# Patient Record
Sex: MALE | Race: WHITE | NOT HISPANIC OR LATINO | Employment: OTHER | URBAN - METROPOLITAN AREA
[De-identification: names, ages, dates, MRNs, and addresses within clinical notes are randomized per-mention and may not be internally consistent; named-entity substitution may affect disease eponyms.]

---

## 2017-01-03 ENCOUNTER — COMMUNICATION - HEALTHEAST (OUTPATIENT)
Dept: FAMILY MEDICINE | Facility: CLINIC | Age: 67
End: 2017-01-03

## 2017-01-05 ENCOUNTER — OFFICE VISIT - HEALTHEAST (OUTPATIENT)
Dept: FAMILY MEDICINE | Facility: CLINIC | Age: 67
End: 2017-01-05

## 2017-01-05 ENCOUNTER — COMMUNICATION - HEALTHEAST (OUTPATIENT)
Dept: FAMILY MEDICINE | Facility: CLINIC | Age: 67
End: 2017-01-05

## 2017-01-05 DIAGNOSIS — H61.20 IMPACTED CERUMEN: ICD-10-CM

## 2017-01-05 DIAGNOSIS — M72.2 PLANTAR FASCIITIS: ICD-10-CM

## 2017-01-05 DIAGNOSIS — S76.311A RIGHT HAMSTRING MUSCLE STRAIN: ICD-10-CM

## 2017-01-05 DIAGNOSIS — D49.2 SKIN NEOPLASM: ICD-10-CM

## 2017-01-05 ASSESSMENT — MIFFLIN-ST. JEOR: SCORE: 1726.08

## 2017-01-25 ENCOUNTER — OFFICE VISIT - HEALTHEAST (OUTPATIENT)
Dept: OTOLARYNGOLOGY | Facility: CLINIC | Age: 67
End: 2017-01-25

## 2017-01-25 DIAGNOSIS — H61.23 BILATERAL IMPACTED CERUMEN: ICD-10-CM

## 2017-01-25 ASSESSMENT — MIFFLIN-ST. JEOR: SCORE: 1726.08

## 2017-04-24 ENCOUNTER — RECORDS - HEALTHEAST (OUTPATIENT)
Dept: ADMINISTRATIVE | Facility: OTHER | Age: 67
End: 2017-04-24

## 2018-02-23 ENCOUNTER — OFFICE VISIT - HEALTHEAST (OUTPATIENT)
Dept: FAMILY MEDICINE | Facility: CLINIC | Age: 68
End: 2018-02-23

## 2018-02-23 DIAGNOSIS — R03.0 ELEVATED BLOOD PRESSURE READING: ICD-10-CM

## 2018-02-23 DIAGNOSIS — Z23 NEED FOR VACCINATION: ICD-10-CM

## 2018-02-23 DIAGNOSIS — Z00.00 ROUTINE GENERAL MEDICAL EXAMINATION AT A HEALTH CARE FACILITY: ICD-10-CM

## 2018-02-23 LAB
ALBUMIN SERPL-MCNC: 3.8 G/DL (ref 3.5–5)
ALP SERPL-CCNC: 73 U/L (ref 45–120)
ALT SERPL W P-5'-P-CCNC: 43 U/L (ref 0–45)
ANION GAP SERPL CALCULATED.3IONS-SCNC: 10 MMOL/L (ref 5–18)
AST SERPL W P-5'-P-CCNC: 22 U/L (ref 0–40)
BILIRUB SERPL-MCNC: 0.5 MG/DL (ref 0–1)
BUN SERPL-MCNC: 19 MG/DL (ref 8–22)
CALCIUM SERPL-MCNC: 8.9 MG/DL (ref 8.5–10.5)
CHLORIDE BLD-SCNC: 107 MMOL/L (ref 98–107)
CO2 SERPL-SCNC: 23 MMOL/L (ref 22–31)
CREAT SERPL-MCNC: 1.07 MG/DL (ref 0.7–1.3)
ERYTHROCYTE [DISTWIDTH] IN BLOOD BY AUTOMATED COUNT: 11.8 % (ref 11–14.5)
GFR SERPL CREATININE-BSD FRML MDRD: >60 ML/MIN/1.73M2
GLUCOSE BLD-MCNC: 84 MG/DL (ref 70–125)
HCT VFR BLD AUTO: 47.1 % (ref 40–54)
HGB BLD-MCNC: 15.8 G/DL (ref 14–18)
MCH RBC QN AUTO: 31.4 PG (ref 27–34)
MCHC RBC AUTO-ENTMCNC: 33.5 G/DL (ref 32–36)
MCV RBC AUTO: 94 FL (ref 80–100)
PLATELET # BLD AUTO: 152 THOU/UL (ref 140–440)
PMV BLD AUTO: 8.5 FL (ref 7–10)
POTASSIUM BLD-SCNC: 4.6 MMOL/L (ref 3.5–5)
PROT SERPL-MCNC: 7.1 G/DL (ref 6–8)
RBC # BLD AUTO: 5.02 MILL/UL (ref 4.4–6.2)
SODIUM SERPL-SCNC: 140 MMOL/L (ref 136–145)
WBC: 7.2 THOU/UL (ref 4–11)

## 2018-02-23 ASSESSMENT — MIFFLIN-ST. JEOR: SCORE: 1723.45

## 2018-03-26 ENCOUNTER — OFFICE VISIT - HEALTHEAST (OUTPATIENT)
Dept: FAMILY MEDICINE | Facility: CLINIC | Age: 68
End: 2018-03-26

## 2018-03-26 DIAGNOSIS — R03.0 ELEVATED BLOOD PRESSURE READING: ICD-10-CM

## 2018-03-26 ASSESSMENT — MIFFLIN-ST. JEOR: SCORE: 1721.27

## 2019-03-18 ENCOUNTER — OFFICE VISIT - HEALTHEAST (OUTPATIENT)
Dept: FAMILY MEDICINE | Facility: CLINIC | Age: 69
End: 2019-03-18

## 2019-03-18 DIAGNOSIS — Z11.3 SCREEN FOR STD (SEXUALLY TRANSMITTED DISEASE): ICD-10-CM

## 2019-03-18 DIAGNOSIS — K12.0 APHTHOUS STOMATITIS: ICD-10-CM

## 2019-03-18 ASSESSMENT — MIFFLIN-ST. JEOR: SCORE: 1707.66

## 2019-03-19 LAB — T PALLIDUM AB SER QL: NEGATIVE

## 2019-03-20 LAB
HSV1 IGG SERPL QL IA: POSITIVE
HSV2 IGG SERPL QL IA: NEGATIVE

## 2019-03-21 ENCOUNTER — COMMUNICATION - HEALTHEAST (OUTPATIENT)
Dept: FAMILY MEDICINE | Facility: CLINIC | Age: 69
End: 2019-03-21

## 2019-08-30 ENCOUNTER — OFFICE VISIT - HEALTHEAST (OUTPATIENT)
Dept: FAMILY MEDICINE | Facility: CLINIC | Age: 69
End: 2019-08-30

## 2019-08-30 DIAGNOSIS — M54.50 ACUTE RIGHT-SIDED LOW BACK PAIN WITHOUT SCIATICA: ICD-10-CM

## 2019-08-30 ASSESSMENT — MIFFLIN-ST. JEOR: SCORE: 1721.27

## 2019-09-01 ENCOUNTER — SURGERY - HEALTHEAST (OUTPATIENT)
Dept: SURGERY | Facility: HOSPITAL | Age: 69
End: 2019-09-01

## 2019-09-01 ENCOUNTER — ANESTHESIA - HEALTHEAST (OUTPATIENT)
Dept: MEDSURG UNIT | Facility: HOSPITAL | Age: 69
End: 2019-09-01

## 2019-09-01 ASSESSMENT — MIFFLIN-ST. JEOR
SCORE: 1661.21
SCORE: 1661.21

## 2019-09-02 ENCOUNTER — ANESTHESIA - HEALTHEAST (OUTPATIENT)
Dept: SURGERY | Facility: HOSPITAL | Age: 69
End: 2019-09-02

## 2019-09-02 ENCOUNTER — SURGERY - HEALTHEAST (OUTPATIENT)
Dept: SURGERY | Facility: HOSPITAL | Age: 69
End: 2019-09-02

## 2019-09-09 ENCOUNTER — RECORDS - HEALTHEAST (OUTPATIENT)
Dept: LAB | Facility: CLINIC | Age: 69
End: 2019-09-09

## 2019-09-10 ENCOUNTER — OFFICE VISIT - HEALTHEAST (OUTPATIENT)
Dept: GERIATRICS | Facility: CLINIC | Age: 69
End: 2019-09-10

## 2019-09-10 DIAGNOSIS — M46.58 SEPTIC ARTHRITIS OF SACROILIAC JOINT (H): ICD-10-CM

## 2019-09-10 DIAGNOSIS — C61 PROSTATE CANCER METASTATIC TO BONE (H): ICD-10-CM

## 2019-09-10 DIAGNOSIS — C79.51 PROSTATE CANCER METASTATIC TO BONE (H): ICD-10-CM

## 2019-09-10 DIAGNOSIS — M00.9 SEPTIC ARTHRITIS OF AC JOINT (H): ICD-10-CM

## 2019-09-10 LAB
ALBUMIN SERPL-MCNC: 2.6 G/DL (ref 3.5–5)
ALP SERPL-CCNC: 87 U/L (ref 45–120)
ALT SERPL W P-5'-P-CCNC: 28 U/L (ref 0–45)
ANION GAP SERPL CALCULATED.3IONS-SCNC: 7 MMOL/L (ref 5–18)
AST SERPL W P-5'-P-CCNC: 19 U/L (ref 0–40)
BASOPHILS # BLD AUTO: 0.1 THOU/UL (ref 0–0.2)
BASOPHILS NFR BLD AUTO: 1 % (ref 0–2)
BILIRUB SERPL-MCNC: 0.6 MG/DL (ref 0–1)
BUN SERPL-MCNC: 24 MG/DL (ref 8–22)
C REACTIVE PROTEIN LHE: 2 MG/DL (ref 0–0.8)
CALCIUM SERPL-MCNC: 8.9 MG/DL (ref 8.5–10.5)
CHLORIDE BLD-SCNC: 103 MMOL/L (ref 98–107)
CO2 SERPL-SCNC: 28 MMOL/L (ref 22–31)
CREAT SERPL-MCNC: 1.03 MG/DL (ref 0.7–1.3)
EOSINOPHIL # BLD AUTO: 0.2 THOU/UL (ref 0–0.4)
EOSINOPHIL NFR BLD AUTO: 1 % (ref 0–6)
ERYTHROCYTE [DISTWIDTH] IN BLOOD BY AUTOMATED COUNT: 12.9 % (ref 11–14.5)
GFR SERPL CREATININE-BSD FRML MDRD: >60 ML/MIN/1.73M2
GLUCOSE BLD-MCNC: 93 MG/DL (ref 70–125)
HCT VFR BLD AUTO: 40.2 % (ref 40–54)
HGB BLD-MCNC: 13.2 G/DL (ref 14–18)
LYMPHOCYTES # BLD AUTO: 1.5 THOU/UL (ref 0.8–4.4)
LYMPHOCYTES NFR BLD AUTO: 14 % (ref 20–40)
MCH RBC QN AUTO: 31.1 PG (ref 27–34)
MCHC RBC AUTO-ENTMCNC: 32.8 G/DL (ref 32–36)
MCV RBC AUTO: 95 FL (ref 80–100)
MONOCYTES # BLD AUTO: 1 THOU/UL (ref 0–0.9)
MONOCYTES NFR BLD AUTO: 9 % (ref 2–10)
NEUTROPHILS # BLD AUTO: 7.6 THOU/UL (ref 2–7.7)
NEUTROPHILS NFR BLD AUTO: 71 % (ref 50–70)
PLATELET # BLD AUTO: 218 THOU/UL (ref 140–440)
PMV BLD AUTO: 11.8 FL (ref 8.5–12.5)
POTASSIUM BLD-SCNC: 4 MMOL/L (ref 3.5–5)
PROT SERPL-MCNC: 6.5 G/DL (ref 6–8)
RBC # BLD AUTO: 4.25 MILL/UL (ref 4.4–6.2)
SODIUM SERPL-SCNC: 138 MMOL/L (ref 136–145)
WBC: 10.7 THOU/UL (ref 4–11)

## 2019-09-11 ENCOUNTER — COMMUNICATION - HEALTHEAST (OUTPATIENT)
Dept: GERIATRICS | Facility: CLINIC | Age: 69
End: 2019-09-11

## 2019-09-11 DIAGNOSIS — M00.9 SEPTIC ARTHRITIS OF AC JOINT (H): ICD-10-CM

## 2019-09-11 DIAGNOSIS — M54.9 BACK PAIN, UNSPECIFIED BACK LOCATION, UNSPECIFIED BACK PAIN LATERALITY, UNSPECIFIED CHRONICITY: ICD-10-CM

## 2019-09-12 ENCOUNTER — OFFICE VISIT - HEALTHEAST (OUTPATIENT)
Dept: GERIATRICS | Facility: CLINIC | Age: 69
End: 2019-09-12

## 2019-09-12 DIAGNOSIS — C79.51 PROSTATE CANCER METASTATIC TO BONE (H): ICD-10-CM

## 2019-09-12 DIAGNOSIS — M00.9 SEPTIC ARTHRITIS OF AC JOINT (H): ICD-10-CM

## 2019-09-12 DIAGNOSIS — C61 PROSTATE CANCER METASTATIC TO BONE (H): ICD-10-CM

## 2019-09-12 LAB
GAMMA INTERFERON BACKGROUND BLD IA-ACNC: 0.04 IU/ML
M TB IFN-G BLD-IMP: NEGATIVE
MITOGEN IGNF BCKGRD COR BLD-ACNC: 0 IU/ML
MITOGEN IGNF BCKGRD COR BLD-ACNC: 0 IU/ML
QTF INTERPRETATION: NORMAL
QTF MITOGEN - NIL: 3.89 IU/ML

## 2019-09-16 ENCOUNTER — RECORDS - HEALTHEAST (OUTPATIENT)
Dept: LAB | Facility: CLINIC | Age: 69
End: 2019-09-16

## 2019-09-16 ENCOUNTER — COMMUNICATION - HEALTHEAST (OUTPATIENT)
Dept: GERIATRICS | Facility: CLINIC | Age: 69
End: 2019-09-16

## 2019-09-17 ENCOUNTER — RECORDS - HEALTHEAST (OUTPATIENT)
Dept: ADMINISTRATIVE | Facility: OTHER | Age: 69
End: 2019-09-17

## 2019-09-17 ENCOUNTER — OFFICE VISIT - HEALTHEAST (OUTPATIENT)
Dept: GERIATRICS | Facility: CLINIC | Age: 69
End: 2019-09-17

## 2019-09-17 DIAGNOSIS — M00.9 SEPTIC ARTHRITIS OF AC JOINT (H): ICD-10-CM

## 2019-09-17 DIAGNOSIS — C79.51 PROSTATE CANCER METASTATIC TO BONE (H): ICD-10-CM

## 2019-09-17 DIAGNOSIS — C61 PROSTATE CANCER METASTATIC TO BONE (H): ICD-10-CM

## 2019-09-17 DIAGNOSIS — M46.58 SEPTIC ARTHRITIS OF SACROILIAC JOINT (H): ICD-10-CM

## 2019-09-17 LAB
ALBUMIN SERPL-MCNC: 2.6 G/DL (ref 3.5–5)
ALP SERPL-CCNC: 87 U/L (ref 45–120)
ALT SERPL W P-5'-P-CCNC: 23 U/L (ref 0–45)
ANION GAP SERPL CALCULATED.3IONS-SCNC: 7 MMOL/L (ref 5–18)
AST SERPL W P-5'-P-CCNC: 19 U/L (ref 0–40)
BASOPHILS # BLD AUTO: 0.1 THOU/UL (ref 0–0.2)
BASOPHILS NFR BLD AUTO: 1 % (ref 0–2)
BILIRUB SERPL-MCNC: 0.4 MG/DL (ref 0–1)
BUN SERPL-MCNC: 15 MG/DL (ref 8–22)
C REACTIVE PROTEIN LHE: 6.3 MG/DL (ref 0–0.8)
CALCIUM SERPL-MCNC: 9.4 MG/DL (ref 8.5–10.5)
CHLORIDE BLD-SCNC: 102 MMOL/L (ref 98–107)
CO2 SERPL-SCNC: 29 MMOL/L (ref 22–31)
CREAT SERPL-MCNC: 0.8 MG/DL (ref 0.7–1.3)
EOSINOPHIL # BLD AUTO: 0.1 THOU/UL (ref 0–0.4)
EOSINOPHIL NFR BLD AUTO: 2 % (ref 0–6)
ERYTHROCYTE [DISTWIDTH] IN BLOOD BY AUTOMATED COUNT: 12.3 % (ref 11–14.5)
GFR SERPL CREATININE-BSD FRML MDRD: >60 ML/MIN/1.73M2
GLUCOSE BLD-MCNC: 102 MG/DL (ref 70–125)
HCT VFR BLD AUTO: 36.5 % (ref 40–54)
HGB BLD-MCNC: 11.9 G/DL (ref 14–18)
LYMPHOCYTES # BLD AUTO: 0.8 THOU/UL (ref 0.8–4.4)
LYMPHOCYTES NFR BLD AUTO: 12 % (ref 20–40)
MCH RBC QN AUTO: 30.9 PG (ref 27–34)
MCHC RBC AUTO-ENTMCNC: 32.6 G/DL (ref 32–36)
MCV RBC AUTO: 95 FL (ref 80–100)
MONOCYTES # BLD AUTO: 0.8 THOU/UL (ref 0–0.9)
MONOCYTES NFR BLD AUTO: 12 % (ref 2–10)
NEUTROPHILS # BLD AUTO: 4.6 THOU/UL (ref 2–7.7)
NEUTROPHILS NFR BLD AUTO: 72 % (ref 50–70)
PLATELET # BLD AUTO: 259 THOU/UL (ref 140–440)
PMV BLD AUTO: 11.4 FL (ref 8.5–12.5)
POTASSIUM BLD-SCNC: 4.1 MMOL/L (ref 3.5–5)
PROT SERPL-MCNC: 6.9 G/DL (ref 6–8)
RBC # BLD AUTO: 3.85 MILL/UL (ref 4.4–6.2)
SODIUM SERPL-SCNC: 138 MMOL/L (ref 136–145)
WBC: 6.4 THOU/UL (ref 4–11)

## 2019-09-19 ENCOUNTER — OFFICE VISIT - HEALTHEAST (OUTPATIENT)
Dept: GERIATRICS | Facility: CLINIC | Age: 69
End: 2019-09-19

## 2019-09-19 DIAGNOSIS — C79.51 PROSTATE CANCER METASTATIC TO BONE (H): ICD-10-CM

## 2019-09-19 DIAGNOSIS — C61 PROSTATE CANCER METASTATIC TO BONE (H): ICD-10-CM

## 2019-09-19 DIAGNOSIS — M00.9 SEPTIC ARTHRITIS OF AC JOINT (H): ICD-10-CM

## 2019-09-19 DIAGNOSIS — M46.58 SEPTIC ARTHRITIS OF SACROILIAC JOINT (H): ICD-10-CM

## 2019-09-23 ENCOUNTER — RECORDS - HEALTHEAST (OUTPATIENT)
Dept: LAB | Facility: CLINIC | Age: 69
End: 2019-09-23

## 2019-09-24 ENCOUNTER — OFFICE VISIT - HEALTHEAST (OUTPATIENT)
Dept: GERIATRICS | Facility: CLINIC | Age: 69
End: 2019-09-24

## 2019-09-24 DIAGNOSIS — C79.51 PROSTATE CANCER METASTATIC TO BONE (H): ICD-10-CM

## 2019-09-24 DIAGNOSIS — C61 PROSTATE CANCER METASTATIC TO BONE (H): ICD-10-CM

## 2019-09-24 DIAGNOSIS — M00.9 SEPTIC ARTHRITIS OF AC JOINT (H): ICD-10-CM

## 2019-09-24 DIAGNOSIS — M46.58 SEPTIC ARTHRITIS OF SACROILIAC JOINT (H): ICD-10-CM

## 2019-09-24 LAB
ALBUMIN SERPL-MCNC: 3.1 G/DL (ref 3.5–5)
ALP SERPL-CCNC: 101 U/L (ref 45–120)
ALT SERPL W P-5'-P-CCNC: 19 U/L (ref 0–45)
ANION GAP SERPL CALCULATED.3IONS-SCNC: 8 MMOL/L (ref 5–18)
AST SERPL W P-5'-P-CCNC: 24 U/L (ref 0–40)
BASOPHILS # BLD AUTO: 0 THOU/UL (ref 0–0.2)
BASOPHILS NFR BLD AUTO: 1 % (ref 0–2)
BILIRUB SERPL-MCNC: 0.4 MG/DL (ref 0–1)
BUN SERPL-MCNC: 18 MG/DL (ref 8–22)
C REACTIVE PROTEIN LHE: 2.3 MG/DL (ref 0–0.8)
CALCIUM SERPL-MCNC: 10.1 MG/DL (ref 8.5–10.5)
CHLORIDE BLD-SCNC: 102 MMOL/L (ref 98–107)
CO2 SERPL-SCNC: 29 MMOL/L (ref 22–31)
CREAT SERPL-MCNC: 1.05 MG/DL (ref 0.7–1.3)
EOSINOPHIL # BLD AUTO: 0.2 THOU/UL (ref 0–0.4)
EOSINOPHIL NFR BLD AUTO: 3 % (ref 0–6)
ERYTHROCYTE [DISTWIDTH] IN BLOOD BY AUTOMATED COUNT: 12.1 % (ref 11–14.5)
GFR SERPL CREATININE-BSD FRML MDRD: >60 ML/MIN/1.73M2
GLUCOSE BLD-MCNC: 89 MG/DL (ref 70–125)
HCT VFR BLD AUTO: 40.4 % (ref 40–54)
HGB BLD-MCNC: 13 G/DL (ref 14–18)
LYMPHOCYTES # BLD AUTO: 0.9 THOU/UL (ref 0.8–4.4)
LYMPHOCYTES NFR BLD AUTO: 13 % (ref 20–40)
MCH RBC QN AUTO: 30.4 PG (ref 27–34)
MCHC RBC AUTO-ENTMCNC: 32.2 G/DL (ref 32–36)
MCV RBC AUTO: 94 FL (ref 80–100)
MONOCYTES # BLD AUTO: 0.8 THOU/UL (ref 0–0.9)
MONOCYTES NFR BLD AUTO: 11 % (ref 2–10)
NEUTROPHILS # BLD AUTO: 4.7 THOU/UL (ref 2–7.7)
NEUTROPHILS NFR BLD AUTO: 72 % (ref 50–70)
PLATELET # BLD AUTO: 224 THOU/UL (ref 140–440)
PMV BLD AUTO: 11.4 FL (ref 8.5–12.5)
POTASSIUM BLD-SCNC: 4.2 MMOL/L (ref 3.5–5)
PROT SERPL-MCNC: 7.8 G/DL (ref 6–8)
RBC # BLD AUTO: 4.28 MILL/UL (ref 4.4–6.2)
SODIUM SERPL-SCNC: 139 MMOL/L (ref 136–145)
WBC: 6.6 THOU/UL (ref 4–11)

## 2019-09-25 ENCOUNTER — OFFICE VISIT - HEALTHEAST (OUTPATIENT)
Dept: INFECTIOUS DISEASES | Facility: CLINIC | Age: 69
End: 2019-09-25

## 2019-09-25 DIAGNOSIS — M00.9 SEPTIC ARTHRITIS OF AC JOINT (H): ICD-10-CM

## 2019-09-25 DIAGNOSIS — M46.58 SEPTIC ARTHRITIS OF SACROILIAC JOINT (H): ICD-10-CM

## 2019-09-26 ENCOUNTER — OFFICE VISIT - HEALTHEAST (OUTPATIENT)
Dept: GERIATRICS | Facility: CLINIC | Age: 69
End: 2019-09-26

## 2019-09-26 DIAGNOSIS — C79.51 PROSTATE CANCER METASTATIC TO BONE (H): ICD-10-CM

## 2019-09-26 DIAGNOSIS — M00.9 SEPTIC ARTHRITIS OF AC JOINT (H): ICD-10-CM

## 2019-09-26 DIAGNOSIS — M46.58 SEPTIC ARTHRITIS OF SACROILIAC JOINT (H): ICD-10-CM

## 2019-09-26 DIAGNOSIS — C61 PROSTATE CANCER METASTATIC TO BONE (H): ICD-10-CM

## 2019-09-27 ENCOUNTER — RECORDS - HEALTHEAST (OUTPATIENT)
Dept: ADMINISTRATIVE | Facility: OTHER | Age: 69
End: 2019-09-27

## 2019-09-30 ENCOUNTER — RECORDS - HEALTHEAST (OUTPATIENT)
Dept: LAB | Facility: CLINIC | Age: 69
End: 2019-09-30

## 2019-10-01 ENCOUNTER — OFFICE VISIT - HEALTHEAST (OUTPATIENT)
Dept: GERIATRICS | Facility: CLINIC | Age: 69
End: 2019-10-01

## 2019-10-01 DIAGNOSIS — M46.58 SEPTIC ARTHRITIS OF SACROILIAC JOINT (H): ICD-10-CM

## 2019-10-01 DIAGNOSIS — M00.9 SEPTIC ARTHRITIS OF AC JOINT (H): ICD-10-CM

## 2019-10-01 DIAGNOSIS — C61 PROSTATE CANCER METASTATIC TO BONE (H): ICD-10-CM

## 2019-10-01 DIAGNOSIS — C79.51 PROSTATE CANCER METASTATIC TO BONE (H): ICD-10-CM

## 2019-10-01 LAB
ALBUMIN SERPL-MCNC: 2.9 G/DL (ref 3.5–5)
ALP SERPL-CCNC: 232 U/L (ref 45–120)
ALT SERPL W P-5'-P-CCNC: 57 U/L (ref 0–45)
ANION GAP SERPL CALCULATED.3IONS-SCNC: 9 MMOL/L (ref 5–18)
AST SERPL W P-5'-P-CCNC: 39 U/L (ref 0–40)
BASOPHILS # BLD AUTO: 0 THOU/UL (ref 0–0.2)
BASOPHILS NFR BLD AUTO: 0 % (ref 0–2)
BILIRUB SERPL-MCNC: 0.4 MG/DL (ref 0–1)
BUN SERPL-MCNC: 15 MG/DL (ref 8–22)
C REACTIVE PROTEIN LHE: 5.8 MG/DL (ref 0–0.8)
CALCIUM SERPL-MCNC: 9 MG/DL (ref 8.5–10.5)
CHLORIDE BLD-SCNC: 104 MMOL/L (ref 98–107)
CO2 SERPL-SCNC: 26 MMOL/L (ref 22–31)
CREAT SERPL-MCNC: 0.81 MG/DL (ref 0.7–1.3)
EOSINOPHIL # BLD AUTO: 0.3 THOU/UL (ref 0–0.4)
EOSINOPHIL NFR BLD AUTO: 4 % (ref 0–6)
ERYTHROCYTE [DISTWIDTH] IN BLOOD BY AUTOMATED COUNT: 12.4 % (ref 11–14.5)
GFR SERPL CREATININE-BSD FRML MDRD: >60 ML/MIN/1.73M2
GLUCOSE BLD-MCNC: 96 MG/DL (ref 70–125)
HCT VFR BLD AUTO: 35.1 % (ref 40–54)
HGB BLD-MCNC: 11.7 G/DL (ref 14–18)
LYMPHOCYTES # BLD AUTO: 0.5 THOU/UL (ref 0.8–4.4)
LYMPHOCYTES NFR BLD AUTO: 7 % (ref 20–40)
MCH RBC QN AUTO: 30.5 PG (ref 27–34)
MCHC RBC AUTO-ENTMCNC: 33.3 G/DL (ref 32–36)
MCV RBC AUTO: 92 FL (ref 80–100)
MONOCYTES # BLD AUTO: 0.7 THOU/UL (ref 0–0.9)
MONOCYTES NFR BLD AUTO: 10 % (ref 2–10)
NEUTROPHILS # BLD AUTO: 5.4 THOU/UL (ref 2–7.7)
NEUTROPHILS NFR BLD AUTO: 78 % (ref 50–70)
PLATELET # BLD AUTO: 198 THOU/UL (ref 140–440)
PMV BLD AUTO: 11.4 FL (ref 8.5–12.5)
POTASSIUM BLD-SCNC: 4.2 MMOL/L (ref 3.5–5)
PROT SERPL-MCNC: 6.5 G/DL (ref 6–8)
RBC # BLD AUTO: 3.83 MILL/UL (ref 4.4–6.2)
SODIUM SERPL-SCNC: 139 MMOL/L (ref 136–145)
WBC: 6.9 THOU/UL (ref 4–11)

## 2019-10-03 ENCOUNTER — OFFICE VISIT - HEALTHEAST (OUTPATIENT)
Dept: GERIATRICS | Facility: CLINIC | Age: 69
End: 2019-10-03

## 2019-10-03 DIAGNOSIS — M00.9 SEPTIC ARTHRITIS OF AC JOINT (H): ICD-10-CM

## 2019-10-03 DIAGNOSIS — C79.51 PROSTATE CANCER METASTATIC TO BONE (H): ICD-10-CM

## 2019-10-03 DIAGNOSIS — M46.58 SEPTIC ARTHRITIS OF SACROILIAC JOINT (H): ICD-10-CM

## 2019-10-03 DIAGNOSIS — C61 PROSTATE CANCER METASTATIC TO BONE (H): ICD-10-CM

## 2019-10-07 ENCOUNTER — RECORDS - HEALTHEAST (OUTPATIENT)
Dept: LAB | Facility: CLINIC | Age: 69
End: 2019-10-07

## 2019-10-07 ENCOUNTER — HOSPITAL ENCOUNTER (OUTPATIENT)
Dept: MRI IMAGING | Facility: HOSPITAL | Age: 69
Discharge: HOME | End: 2019-10-07
Attending: INTERNAL MEDICINE

## 2019-10-07 ENCOUNTER — OFFICE VISIT - HEALTHEAST (OUTPATIENT)
Dept: GERIATRICS | Facility: CLINIC | Age: 69
End: 2019-10-07

## 2019-10-07 DIAGNOSIS — M00.9 SEPTIC ARTHRITIS OF AC JOINT (H): ICD-10-CM

## 2019-10-07 DIAGNOSIS — R29.898 RIGHT LEG WEAKNESS: ICD-10-CM

## 2019-10-07 DIAGNOSIS — C79.51 PROSTATE CANCER METASTATIC TO BONE (H): ICD-10-CM

## 2019-10-07 DIAGNOSIS — M46.38: ICD-10-CM

## 2019-10-07 DIAGNOSIS — I10 ESSENTIAL HYPERTENSION: ICD-10-CM

## 2019-10-07 DIAGNOSIS — C61 PROSTATE CANCER METASTATIC TO BONE (H): ICD-10-CM

## 2019-10-08 ENCOUNTER — RECORDS - HEALTHEAST (OUTPATIENT)
Dept: ADMINISTRATIVE | Facility: OTHER | Age: 69
End: 2019-10-08

## 2019-10-08 ENCOUNTER — OFFICE VISIT - HEALTHEAST (OUTPATIENT)
Dept: GERIATRICS | Facility: CLINIC | Age: 69
End: 2019-10-08

## 2019-10-08 DIAGNOSIS — C79.51 PROSTATE CANCER METASTATIC TO BONE (H): ICD-10-CM

## 2019-10-08 DIAGNOSIS — C61 PROSTATE CANCER METASTATIC TO BONE (H): ICD-10-CM

## 2019-10-08 DIAGNOSIS — R29.898 RIGHT LEG WEAKNESS: ICD-10-CM

## 2019-10-08 DIAGNOSIS — M00.9 SEPTIC ARTHRITIS OF AC JOINT (H): ICD-10-CM

## 2019-10-08 LAB
ALBUMIN SERPL-MCNC: 2.9 G/DL (ref 3.5–5)
ALP SERPL-CCNC: 158 U/L (ref 45–120)
ALT SERPL W P-5'-P-CCNC: 33 U/L (ref 0–45)
ANION GAP SERPL CALCULATED.3IONS-SCNC: 9 MMOL/L (ref 5–18)
AST SERPL W P-5'-P-CCNC: 23 U/L (ref 0–40)
BASOPHILS # BLD AUTO: 0 THOU/UL (ref 0–0.2)
BASOPHILS NFR BLD AUTO: 0 % (ref 0–2)
BILIRUB SERPL-MCNC: 0.3 MG/DL (ref 0–1)
BUN SERPL-MCNC: 15 MG/DL (ref 8–22)
C REACTIVE PROTEIN LHE: 2.9 MG/DL (ref 0–0.8)
CALCIUM SERPL-MCNC: 9.5 MG/DL (ref 8.5–10.5)
CHLORIDE BLD-SCNC: 104 MMOL/L (ref 98–107)
CO2 SERPL-SCNC: 27 MMOL/L (ref 22–31)
CREAT SERPL-MCNC: 0.94 MG/DL (ref 0.7–1.3)
EOSINOPHIL # BLD AUTO: 0.2 THOU/UL (ref 0–0.4)
EOSINOPHIL NFR BLD AUTO: 3 % (ref 0–6)
ERYTHROCYTE [DISTWIDTH] IN BLOOD BY AUTOMATED COUNT: 12.6 % (ref 11–14.5)
GFR SERPL CREATININE-BSD FRML MDRD: >60 ML/MIN/1.73M2
GLUCOSE BLD-MCNC: 96 MG/DL (ref 70–125)
HCT VFR BLD AUTO: 35.3 % (ref 40–54)
HGB BLD-MCNC: 11.3 G/DL (ref 14–18)
LYMPHOCYTES # BLD AUTO: 0.8 THOU/UL (ref 0.8–4.4)
LYMPHOCYTES NFR BLD AUTO: 11 % (ref 20–40)
MCH RBC QN AUTO: 29.9 PG (ref 27–34)
MCHC RBC AUTO-ENTMCNC: 32 G/DL (ref 32–36)
MCV RBC AUTO: 93 FL (ref 80–100)
MONOCYTES # BLD AUTO: 1 THOU/UL (ref 0–0.9)
MONOCYTES NFR BLD AUTO: 14 % (ref 2–10)
NEUTROPHILS # BLD AUTO: 5 THOU/UL (ref 2–7.7)
NEUTROPHILS NFR BLD AUTO: 71 % (ref 50–70)
PLATELET # BLD AUTO: 220 THOU/UL (ref 140–440)
PMV BLD AUTO: 11.2 FL (ref 8.5–12.5)
POTASSIUM BLD-SCNC: 4 MMOL/L (ref 3.5–5)
PROT SERPL-MCNC: 6.9 G/DL (ref 6–8)
RBC # BLD AUTO: 3.78 MILL/UL (ref 4.4–6.2)
SODIUM SERPL-SCNC: 140 MMOL/L (ref 136–145)
WBC: 7 THOU/UL (ref 4–11)

## 2019-10-10 ENCOUNTER — OFFICE VISIT - HEALTHEAST (OUTPATIENT)
Dept: GERIATRICS | Facility: CLINIC | Age: 69
End: 2019-10-10

## 2019-10-10 DIAGNOSIS — C79.51 PROSTATE CANCER METASTATIC TO BONE (H): ICD-10-CM

## 2019-10-10 DIAGNOSIS — R29.898 RIGHT LEG WEAKNESS: ICD-10-CM

## 2019-10-10 DIAGNOSIS — C61 PROSTATE CANCER METASTATIC TO BONE (H): ICD-10-CM

## 2019-10-10 DIAGNOSIS — M00.9 SEPTIC ARTHRITIS OF AC JOINT (H): ICD-10-CM

## 2019-10-14 ENCOUNTER — COMMUNICATION - HEALTHEAST (OUTPATIENT)
Dept: FAMILY MEDICINE | Facility: CLINIC | Age: 69
End: 2019-10-14

## 2019-10-14 ENCOUNTER — COMMUNICATION - HEALTHEAST (OUTPATIENT)
Dept: GERIATRICS | Facility: CLINIC | Age: 69
End: 2019-10-14

## 2019-10-14 ENCOUNTER — RECORDS - HEALTHEAST (OUTPATIENT)
Dept: ADMINISTRATIVE | Facility: OTHER | Age: 69
End: 2019-10-14

## 2019-10-14 ENCOUNTER — AMBULATORY - HEALTHEAST (OUTPATIENT)
Dept: GERIATRICS | Facility: CLINIC | Age: 69
End: 2019-10-14

## 2019-10-22 ENCOUNTER — RECORDS - HEALTHEAST (OUTPATIENT)
Dept: ADMINISTRATIVE | Facility: OTHER | Age: 69
End: 2019-10-22

## 2019-10-30 ENCOUNTER — OFFICE VISIT - HEALTHEAST (OUTPATIENT)
Dept: FAMILY MEDICINE | Facility: CLINIC | Age: 69
End: 2019-10-30

## 2019-10-30 DIAGNOSIS — C61 PROSTATE CANCER METASTATIC TO BONE (H): ICD-10-CM

## 2019-10-30 DIAGNOSIS — C79.51 PROSTATE CANCER METASTATIC TO BONE (H): ICD-10-CM

## 2019-10-30 DIAGNOSIS — M17.11 PRIMARY OSTEOARTHRITIS OF RIGHT KNEE: ICD-10-CM

## 2019-10-30 DIAGNOSIS — I10 ESSENTIAL HYPERTENSION: ICD-10-CM

## 2019-10-30 ASSESSMENT — MIFFLIN-ST. JEOR: SCORE: 1643.3

## 2019-12-05 ENCOUNTER — RECORDS - HEALTHEAST (OUTPATIENT)
Dept: ADMINISTRATIVE | Facility: OTHER | Age: 69
End: 2019-12-05

## 2019-12-10 ENCOUNTER — RECORDS - HEALTHEAST (OUTPATIENT)
Dept: ADMINISTRATIVE | Facility: OTHER | Age: 69
End: 2019-12-10

## 2019-12-30 ENCOUNTER — RECORDS - HEALTHEAST (OUTPATIENT)
Dept: ADMINISTRATIVE | Facility: OTHER | Age: 69
End: 2019-12-30

## 2020-01-27 ENCOUNTER — RECORDS - HEALTHEAST (OUTPATIENT)
Dept: ADMINISTRATIVE | Facility: OTHER | Age: 70
End: 2020-01-27

## 2020-02-17 ENCOUNTER — RECORDS - HEALTHEAST (OUTPATIENT)
Dept: ADMINISTRATIVE | Facility: OTHER | Age: 70
End: 2020-02-17

## 2020-02-19 ENCOUNTER — RECORDS - HEALTHEAST (OUTPATIENT)
Dept: ADMINISTRATIVE | Facility: OTHER | Age: 70
End: 2020-02-19

## 2020-03-09 ENCOUNTER — RECORDS - HEALTHEAST (OUTPATIENT)
Dept: ADMINISTRATIVE | Facility: OTHER | Age: 70
End: 2020-03-09

## 2020-03-10 ENCOUNTER — OFFICE VISIT - HEALTHEAST (OUTPATIENT)
Dept: FAMILY MEDICINE | Facility: CLINIC | Age: 70
End: 2020-03-10

## 2020-03-10 DIAGNOSIS — C61 PROSTATE CANCER METASTATIC TO BONE (H): ICD-10-CM

## 2020-03-10 DIAGNOSIS — I10 ESSENTIAL HYPERTENSION: ICD-10-CM

## 2020-03-10 DIAGNOSIS — Z23 NEED FOR VACCINATION: ICD-10-CM

## 2020-03-10 DIAGNOSIS — Z00.00 ROUTINE GENERAL MEDICAL EXAMINATION AT A HEALTH CARE FACILITY: ICD-10-CM

## 2020-03-10 DIAGNOSIS — R05.9 COUGH: ICD-10-CM

## 2020-03-10 DIAGNOSIS — C79.51 PROSTATE CANCER METASTATIC TO BONE (H): ICD-10-CM

## 2020-03-10 ASSESSMENT — ANXIETY QUESTIONNAIRES
1. FEELING NERVOUS, ANXIOUS, OR ON EDGE: NOT AT ALL
2. NOT BEING ABLE TO STOP OR CONTROL WORRYING: NOT AT ALL

## 2020-03-10 ASSESSMENT — MIFFLIN-ST. JEOR: SCORE: 1646.34

## 2020-04-20 ENCOUNTER — RECORDS - HEALTHEAST (OUTPATIENT)
Dept: ADMINISTRATIVE | Facility: OTHER | Age: 70
End: 2020-04-20

## 2020-04-28 ENCOUNTER — COMMUNICATION - HEALTHEAST (OUTPATIENT)
Dept: SCHEDULING | Facility: CLINIC | Age: 70
End: 2020-04-28

## 2020-05-04 ENCOUNTER — OFFICE VISIT - HEALTHEAST (OUTPATIENT)
Dept: FAMILY MEDICINE | Facility: CLINIC | Age: 70
End: 2020-05-04

## 2020-05-04 DIAGNOSIS — L30.9 DERMATITIS: ICD-10-CM

## 2020-06-17 ENCOUNTER — RECORDS - HEALTHEAST (OUTPATIENT)
Dept: ADMINISTRATIVE | Facility: OTHER | Age: 70
End: 2020-06-17

## 2020-07-21 ENCOUNTER — RECORDS - HEALTHEAST (OUTPATIENT)
Dept: ADMINISTRATIVE | Facility: OTHER | Age: 70
End: 2020-07-21

## 2020-11-07 ENCOUNTER — COMMUNICATION - HEALTHEAST (OUTPATIENT)
Dept: FAMILY MEDICINE | Facility: CLINIC | Age: 70
End: 2020-11-07

## 2020-11-07 DIAGNOSIS — I10 ESSENTIAL HYPERTENSION: ICD-10-CM

## 2020-12-17 ENCOUNTER — RECORDS - HEALTHEAST (OUTPATIENT)
Dept: ADMINISTRATIVE | Facility: OTHER | Age: 70
End: 2020-12-17

## 2020-12-21 ENCOUNTER — RECORDS - HEALTHEAST (OUTPATIENT)
Dept: ADMINISTRATIVE | Facility: OTHER | Age: 70
End: 2020-12-21

## 2021-01-12 ENCOUNTER — HOSPITAL ENCOUNTER (OUTPATIENT)
Dept: NUCLEAR MEDICINE | Facility: HOSPITAL | Age: 71
Discharge: HOME | End: 2021-01-12
Attending: UROLOGY

## 2021-01-12 ENCOUNTER — HOSPITAL ENCOUNTER (OUTPATIENT)
Dept: CT IMAGING | Facility: HOSPITAL | Age: 71
Discharge: HOME | End: 2021-01-12
Attending: UROLOGY

## 2021-01-12 DIAGNOSIS — C61 MALIGNANT NEOPLASM OF PROSTATE (H): ICD-10-CM

## 2021-01-12 LAB
CREAT BLD-MCNC: 1 MG/DL (ref 0.7–1.3)
GFR SERPL CREATININE-BSD FRML MDRD: >60 ML/MIN/1.73M2

## 2021-01-21 ENCOUNTER — RECORDS - HEALTHEAST (OUTPATIENT)
Dept: ADMINISTRATIVE | Facility: OTHER | Age: 71
End: 2021-01-21

## 2021-04-13 ENCOUNTER — RECORDS - HEALTHEAST (OUTPATIENT)
Dept: ADMINISTRATIVE | Facility: OTHER | Age: 71
End: 2021-04-13

## 2021-04-17 ENCOUNTER — COMMUNICATION - HEALTHEAST (OUTPATIENT)
Dept: SCHEDULING | Facility: CLINIC | Age: 71
End: 2021-04-17

## 2021-04-27 ENCOUNTER — OFFICE VISIT - HEALTHEAST (OUTPATIENT)
Dept: FAMILY MEDICINE | Facility: CLINIC | Age: 71
End: 2021-04-27

## 2021-04-27 DIAGNOSIS — D35.2 PITUITARY ADENOMA (H): ICD-10-CM

## 2021-04-27 DIAGNOSIS — G45.9 TIA (TRANSIENT ISCHEMIC ATTACK): ICD-10-CM

## 2021-04-27 DIAGNOSIS — I10 ESSENTIAL HYPERTENSION: ICD-10-CM

## 2021-04-27 DIAGNOSIS — C79.51 PROSTATE CANCER METASTATIC TO BONE (H): ICD-10-CM

## 2021-04-27 DIAGNOSIS — C61 PROSTATE CANCER METASTATIC TO BONE (H): ICD-10-CM

## 2021-04-27 ASSESSMENT — MIFFLIN-ST. JEOR: SCORE: 1682.98

## 2021-05-11 ENCOUNTER — COMMUNICATION - HEALTHEAST (OUTPATIENT)
Dept: ADMINISTRATIVE | Facility: CLINIC | Age: 71
End: 2021-05-11

## 2021-05-11 DIAGNOSIS — I10 ESSENTIAL HYPERTENSION: ICD-10-CM

## 2021-05-11 RX ORDER — AMLODIPINE BESYLATE 2.5 MG/1
2.5 TABLET ORAL DAILY
Qty: 90 TABLET | Refills: 1 | Status: SHIPPED | OUTPATIENT
Start: 2021-05-11 | End: 2022-05-30

## 2021-05-26 NOTE — PROGRESS NOTES
Assessment: /    Plan:    1. Aphthous stomatitis     2. Screen for STD (sexually transmitted disease)  Syphilis Screen, [facility]    Herpes simplex Virus (Type 1 and 2) IgG Antibody       Apply Maalox to the ulcers.  Recheck if any problem.      Subjective:    HPI:  Venkatesh Alvarado is a 68-year-old male presenting with sores on the tongue.  This has been occurring for years.  It was worse than usual 3 days ago.  He also has 1 inside the left lower lip.  He is concerned that this could be due to syphilis.      He uses mouthwash, which stings the sores.  He takes Aleve for pain.    Social Hx: He has not been sexually active for 2 years.  He believes that his partner had other partners.    Review of Systems: No fever or cough.  No dysuria.  No urethral discharge.  No rash.      Current Outpatient Medications   Medication Sig Dispense Refill     naproxen sodium (ALEVE) 220 MG tablet Take 220-440 mg by mouth every 12 (twelve) hours as needed for pain.       No current facility-administered medications for this visit.          Objective:    Vitals:    03/18/19 1250   BP: 126/80   Pulse: 66   Resp: 19   Temp: 98.4  F (36.9  C)   SpO2: 96%       Gen:  NAD, VSS  Mouth with 2 small aphthous ulcers on the tongue and one inside the left lower lip.  Neck supple without adenopathy  Lungs:  normal  Heart:  normal          ADDITIONAL HISTORY SUMMARIZED (2): None.  DECISION TO OBTAIN EXTRA INFORMATION (1): None.   RADIOLOGY TESTS (1): None.  LABS (1): Ordered.  MEDICINE TESTS (1): None.  INDEPENDENT REVIEW (2 each): None.     Total Data Points: 1

## 2021-05-30 VITALS — HEIGHT: 69 IN | BODY MASS INDEX: 31.7 KG/M2 | WEIGHT: 214 LBS

## 2021-05-30 VITALS — WEIGHT: 214 LBS | HEIGHT: 69 IN | BODY MASS INDEX: 31.7 KG/M2

## 2021-05-31 NOTE — ANESTHESIA CARE TRANSFER NOTE
Last vitals:   Vitals:    09/02/19 2020   BP:    Pulse:    Resp:    Temp: 37  C (98.6  F)   SpO2:      Patient's level of consciousness is drowsy  Spontaneous respirations: yes  Maintains airway independently: yes  Dentition unchanged: yes  Oropharynx: oropharynx clear of all foreign objects    QCDR Measures:  ASA# 20 - Surgical Safety Checklist: WHO surgical safety checklist completed prior to induction    PQRS# 430 - Adult PONV Prevention: 4558F - Pt received => 2 anti-emetic agents (different classes) preop & intraop  ASA# 8 - Peds PONV Prevention: NA - Not pediatric patient, not GA or 2 or more risk factors NOT present  PQRS# 424 - Keke-op Temp Management: 4559F - At least one body temp DOCUMENTED => 35.5C or 95.9F within required timeframe  PQRS# 426 - PACU Transfer Protocol: - Transfer of care checklist used  ASA# 14 - Acute Post-op Pain: ASA14B - Patient did NOT experience pain >= 7 out of 10

## 2021-05-31 NOTE — PROGRESS NOTES
Assessment: /    Plan:    1. Acute right-sided low back pain without sciatica         I am concerned about the possibility of abdominal aortic aneurysm.  Other possibilities include cholecystitis, pancreatitis, kidney stone, other abdominal disorder.  It is possible that muscular pain is a component, however it seems to be more than just that.  Dehydration could be contributing.    Because of the potentially serious nature of his condition, I recommend that he be seen immediately in the ER.  He agrees.  He prefers to go to [facility].  I called to relay this information.      Subjective:    HPI:  Venkatesh Alvarado is a 68-year-old male presenting with right low back pain.  He woke with pain 2 days ago.  He has not had an appetite during this time, and has not eaten.  He has had small amounts of water.  He feels lightheaded.  He also has pain in the posterior right shoulder and upper back.  He feels weak.  He was unable to walk into the clinic, so his son used a clinic wheelchair.  Patient had been golfing 3 days ago, and wondered if that caused his pain.  He is normally very active.  He obtained a cane yesterday.    Social Hx:  Former smoker.  Accompanied by his son, Tim.    Review of Systems:  No fever or vomiting, no dyspnea or chest pain.      Current Outpatient Medications   Medication Sig Dispense Refill     naproxen sodium (ALEVE) 220 MG tablet Take 220-440 mg by mouth every 12 (twelve) hours as needed for pain.       No current facility-administered medications for this visit.          Objective:    Vitals:    08/30/19 1228   BP: 96/64   Pulse: 78   Resp: 22   Temp: 97.8  F (36.6  C)   SpO2: 96%       Gen:  NAD, VSS  Lungs:  normal  Heart:  normal  Abdomen:  No HSM, mass or tenderness  Back without CVA tenderness, no midline spinal tenderness.  Right lumbar paraspinal tenderness.  Straight leg raise normal bilateral.  No rib tenderness.        ADDITIONAL HISTORY SUMMARIZED (2): None.  DECISION TO OBTAIN EXTRA  INFORMATION (1): None.   RADIOLOGY TESTS (1): None.  LABS (1): None.  MEDICINE TESTS (1): None.  INDEPENDENT REVIEW (2 each): None.     Total Data Points: 0

## 2021-05-31 NOTE — ANESTHESIA POSTPROCEDURE EVALUATION
Patient: Venkatesh Alvarado  IRRIGATION AND DEBRIDEMENT, UPPER EXTREMITY, RIGHT ACROMIOCLAVICULAR JOINT, DISTAL CLAVICAL EXCISION  Anesthesia type: general    Patient location: PACU  Last vitals:   Vitals Value Taken Time   /72 9/2/2019  9:00 PM   Temp 37  C (98.6  F) 9/2/2019  8:20 PM   Pulse 70 9/2/2019  9:08 PM   Resp 14 9/2/2019  9:00 PM   SpO2 95 % 9/2/2019  9:08 PM   Vitals shown include unvalidated device data.  Post vital signs: stable  Level of consciousness: awake and responds to simple questions  Post-anesthesia pain: pain controlled  Post-anesthesia nausea and vomiting: no  Pulmonary: unassisted, return to baseline  Cardiovascular: stable and blood pressure at baseline  Hydration: adequate  Anesthetic events: no    QCDR Measures:  ASA# 11 - Keke-op Cardiac Arrest: ASA11B - Patient did NOT experience unanticipated cardiac arrest  ASA# 12 - Keke-op Mortality Rate: ASA12B - Patient did NOT die  ASA# 13 - PACU Re-Intubation Rate: ASA13B - Patient did NOT require a new airway mgmt  ASA# 10 - Composite Anes Safety: ASA10A - No serious adverse event    Additional Notes:  Severe emergence agitation on arrival in PACU, swinging arms & legs, pulling at IVs and threatening staff. 2mg versed given, code green called, 4-point soft restraints applied until agitation resolved. Pt's family aware.  4-point restraints now off, pt much more calm and cooperative.

## 2021-05-31 NOTE — ANESTHESIA PREPROCEDURE EVALUATION
Anesthesia Evaluation      Patient summary reviewed   No history of anesthetic complications     Airway   Mallampati: II   Pulmonary - negative ROS and normal exam                          Cardiovascular - negative ROS and normal exam  ECG reviewed (NSR)  Rhythm: regular  Rate: normal,      ROS comment: 2015 TTE  Summary   No reported chest discomfort   Exercise ECG is negative for ischemia   Resting LV function is normal. Resting LVEF estimated at 55-60%   Post exercise there is appropriate augmentation in systolic function     Neuro/Psych - negative ROS     Comments: Marijuana  EtOH    Endo/Other - negative ROS      Comments: osteoarthritis    GI/Hepatic/Renal - negative ROS      Other findings: Results for LOPEZ HOLGUIN (MRN [id number]) as of 9/1/2019 18:35    9/1/2019 08:13  Sodium: 134 (L)  Potassium: 3.7  Chloride: 100  CO2: 23  Anion Gap, Calculation: 11  BUN: 58 (H)  Creatinine: 1.67 (H)  GFR MDRD Af Amer: 50 (L)  GFR MDRD Non Af Amer: 41 (L)  Calcium: 9.2  Results for LOPEZ HOLGUIN (MRN [id number]) as of 9/1/2019 18:35    9/1/2019 08:13  WBC: 13.4 (H)  RBC: 4.60  Hemoglobin: 14.2  Hematocrit: 40.8  MCV: 89  MCH: 30.9  MCHC: 34.8  RDW: 12.9  Platelets: 125 (L)  Results for LOPEZ HOLGUIN (MRN [id number]) as of 9/1/2019 18:47    9/1/2019 12:39  INR: 1.42 (H)        Dental - normal exam                        Anesthesia Plan  Planned anesthetic: general endotracheal  Will forgo scalene block for POP given infection is too close to block site  GAETT  Antiemetics    ASA 2 - emergent   Induction: intravenous   Anesthetic plan and risks discussed with: patient    Post-op plan: routine recovery

## 2021-06-01 VITALS — HEIGHT: 68 IN | BODY MASS INDEX: 32.89 KG/M2 | WEIGHT: 217 LBS

## 2021-06-01 VITALS — HEIGHT: 68 IN | BODY MASS INDEX: 32.96 KG/M2 | WEIGHT: 217.5 LBS

## 2021-06-01 NOTE — PROGRESS NOTES
[facility]    Facility:   [facility] [[id number]]   Code Status: POLST AVAILABLE    Reassessment of 68-year-old male who presented to hospital with escalating low back pain and right shoulder pain, diagnosed with be group B strep bacteremia and infection right AC and SI joint, additionally noted to have metastatic lesions T 11 and left seventh rib, PSA elevated, presumed metastatic prostate cancer, preceding history of hypertension, stabilized, transferred to TCU for six weeks course of IV Rocephin and for rehabilitation. Continues oxycodone and Tylenol, recent initiation of Voltaren gel.    Review of systems: mild decrease in right SI joint pain, mild remaining right AC joint pain. Continued generalized fatigue. Weakness right leg, difficulty ambulating secondary to pain. Participating fully in physical therapy. Denies sweats or chills. No cardiac or pulmonary symptoms. Mood stable. Remainder of 12 point review of systems obtained negative.    Exam: patient is seen with son present. Alert and oriented, blood pressure 107/65, temperature 97.6, pulse 81, respiratory 17, 02 95% on room air. No facial asymmetry or pharyngeal erythema. No carotid bruits or HJR. No cervical adenopathy. Right shoulder incision site with wound edges well approximated, trace warmth, no erythema or drainage. S1 and S2 regular. Pulmonary exam without rales rhonchi or wheezes. Abdomen without masses tenderness organomegaly. Periphery without edema. -1 strength right leg. Pedal pulses minus one and symmetrical.    Impression and plan:   septic arthritis right AC and right SI joint, continues IV Rocephin, pain decreasing,afebrile, followed by infectious diseases.   Presumed metastatic prostate cancer with elevated PSA and metastatic lesions T 11 and left seventh rib, lesions asymptomatic.   Hypertension on Norvasc with low normal blood pressure recordings.   Right leg weakness without change, nothing to  suggest cord involvement.   Concerns of patient and son in attendance reviewed.      Electronically signed by: Carmen Hein MD

## 2021-06-01 NOTE — TELEPHONE ENCOUNTER
"[facility] Nurse Triage Telephone Note      Provider: Carmen Hein MD  Facility: [facility]    Facility Type: TCU    Caller: Marilu  Call Back Number:  [phone]    Allergies: Patient has no known allergies.    Reason for call: Pt accidentally pulled out his picc line & pharmacy IV team here inserting new one without a valve. They requesting flushes now \"SASH\" with heparin.     Verbal Order/Direction given by Provider: Ok SASH per pharmacy protocal.    Provider giving order: Carmen Hein MD    Verbal order given to: Marilu Balderas RN      "

## 2021-06-01 NOTE — PROGRESS NOTES
[facility]    Facility:   [facility] [[id number]]   Code Status: POLST AVAILABLE    Admission evaluation to TCU of 68-year-old male. History is taken from patient who gives an adequate history and from accompanying hospital notes. Long-standing excellent health, highly active,retired, history of hypertension on Norvasc, presented to hospital with complaints of escalating right shoulder and low back pain, found to have septic arthritis of SI joint and right AC joint, culture results right shoulder group B strep with bacteremia present, metastatic lesions T 11 and left seventh rib identified, PSA elevated at 53, presumed metastatic prostate cancer, on IV Rocephin with recommendation of six-week course through 10/12, oxycodone for pain management, on amlodipine for hypertensive control, stabilized in hospital and transferred to TCU for rehabilitation, pain management, management of medical problems.    Past medical history, current medical problem list, drug allergies, current medication list, social history, family history, code status reviewed in epic.    Review of systems: significant right shoulder pain, increasing right lower back discomfort with radiation into right buttock. Weakness right leg. Highly troubled with potential diagnosis of prostate cancer, concerns regarding management and potential outcome. Tolerating oxycodone. Denies confusion. No chest pain or palpitations. No new focal neurologic deficits. No active G.I. or  symptoms, constipation adequately controlled. Remainder of 12 point review of systems obtained negative.    Exam: vital signs reviewed since admission to TCU. Alert and oriented, sitting in chair, highly concerned, conversant. No pharyngeal erythema. No cervical adenopathy. Mucous membranes moist. Thyroid midline regular without nodularity or tenderness. S1 and S2 regular without murmur rub or extra sounds. Pulmonary exam without rales rhonchi or  wheezes. Abdomen without hepatosplenomegaly masses or tenderness. Periphery without edema. Modest DJD changes knees and digital joints, no acute inflammatory change. Surgical incision site right superior shoulder with wound edges well approximated, no warmth or drainage. Nonspecific tenderness right SI joint. PICC line site without inflammation. No calf tenderness or swelling.    Sodium 138, potassium 4.0, cl 103, creatinine 1.03, CRP 2.0, hemoglobin 13.2, white count 10.7, platelet 218.    Impression and plan:   septic arthritis right AC joint and right SI joint, remains afebrile, on Rocephin through 10/6, significant pain right SI joint, continue oxycodone, additionally on Medrol taper.   Extensive DJD changes LS spine, no evidence of cord compression, weakness right leg reported during hospitalization, not confirmed on today's exam.   New diagnosis of probable prostate cancer, elevated PSA, metastatic suspicion T 11 and left seventh rib, concerns regarding diagnosis reviewed in detail, potential future management and long-term prognosis discussed.   Hypertension on amlodipine with satisfactory control of blood pressure   Recent encephalopathy resolved.   Total time of admission evaluation greater than 60 minutes, greater than 50% of time spent in coordination of care and counseling, multiple concerns reviewed at length.      Electronically signed by: Carmen Hein MD

## 2021-06-01 NOTE — PROGRESS NOTES
[facility]    Facility:   [facility] [[id number]]   Code Status: POLST AVAILABLE    Reevaluation of 68-year-old male admitted to hospital with septic arthritis of right shoulder and right SI joint, had experienced progressive pain of areas for several weeks prior to presentation, additionally found to have metastatic lesions thoracic vertebral and rib, asymptomatic metastatic lesions suspected to be prostatitic with elevated PSA. Transferred to TCU for IV Rocephin administration, tapering course of steroid, for rehabilitation and pain management.    Review of systems: significant decrease in pain noted today. Taking oxycodone rarely. Alternates Voltaren gel and ice to area, requests ice pack and Voltaren gel be changed to PRN. Continued weakness right leg. Increased loose stools, currently on MiraLAX b.i.d. and Colace, requests discontinuation of MiraLAX. No abdominal pain. No blood or mucus in stool. No focal neurologic deficits of new onset. Persistent weakness right lower extremity. Ability to perform in physical therapy improved with recent decrease in pain. Remainder of 12 point review of systems obtained negative.    Exam: alert and oriented, sitting in chair in no apparent distress. No HJR or cervical adenopathy. S1 and S2 regular. Pulmonary exam without adventitious sounds. Incision site right shoulder with staples removed, trace warmth, no erythema or drainage. Focal tenderness right as site joint with trace warmth, no soft-tissue swelling. Abdomen without masses or tenderness. Peripheral pulsations symmetrical, no calf tenderness or swelling. Strength right lower extremity -1.    Impression and plan:   septic arthritis with group B strep positive culture on IV Rocephin four total of six weeks. Significant pain gradually decreasing, change Voltaren and ice pack to PRN, continue Tylenol, oxycodone in use PRN.   Persistent right leg weakness, continue to observe, no  progression.   Previous constipation resolved, now with loose stools, discontinue MiraLAX, continue Colace.   Hypertension on Norvasc with satisfactory control of blood pressure.   Vertebral and rib metastatic lesions, presumed to be prostatic in origin, asymptomatic, will evaluate with urology post completion of IV Rocephin for septic arthritis.      Electronically signed by: Carmen Hein MD

## 2021-06-01 NOTE — PROGRESS NOTES
[facility]    Facility:   [facility] [[id number]]   Code Status: POLST AVAILABLE    68 year old male admitted to hospital with septic arthritis right SI and AC joint, metastatic lesions T 11 and left rib  presumed prostatic in origin, I&D right AC joint, transferred to TCU on IV Rocephin.    Review of systems: continued weakness right lower extremity, pain right SI joint remains highly symptomatic, mild decrease in intensity. No vertebral or rib discomfort. Modest discomfort right shoulder. No fever sweats or chills. No cardiac or pulmonary symptoms. Minimal use of oxycodone for pain. Remainder of 12 point review of systems obtained negative.    Exam: afebrile, vital signs reviewed over past four days. Pleasant and cooperative, sitting in lounger no apparent distress. Observed ambulating, drags right leg, -2 strength right lower extremity. S1 and S2 regular. Pulmonary exam without rales rhonchi or wheezes. No cervical adenopathy. Abdomen without masses tenderness organomegaly. Periphery without edema, trace venous stasis changes present pedal pulses intact. Focal tenderness right SI joint, previous incision right shoulder with wound edges well approximated, trace warmth, no drainage.    Impression and plan:   group B strep right AC and right SI joint, continued pain right SI joint, remains afebrile, continues IV Rocephin, followed by infectious diseases.   Hypertension on Norvasc with adequate control of blood pressure.   Pain control adequate with oxycodone PRN.   Metastatic lesions T 11 and left rib, asymptomatic, presumed prostatic in the etiology. No cord symptoms. Continue Rocephin and rehabilitation.      Electronically signed by: Cramen Hein MD

## 2021-06-01 NOTE — PROGRESS NOTES
[facility]    Facility:   [facility] [[id number]]   Code Status: POLST AVAILABLE     Reassessment of 68-year-old male who presented to hospital with right shoulder and low back pain, diagnosed with group B strep bacteremia, right shoulder culture positive for group B strep, on six week course of IV Rocephin. Additional diagnosis of metastatic lesions to T 11 and left rib, PSA elevated, presumed metastatic prostate cancer. Stabilized in hospital and transferred to TCU for rehabilitation and management of medical problems.    Review of systems: intractable low back pain this morning, improved at present. Aching discomfort localized to right SI joint, no radiation of pain. Right shoulder pain tolerable. Generalized fatigue. Difficulty participating in physical therapy due to degree of pain. Denies fever sweats or chills. No cardiac or pulmonary symptoms. No focal neurologic deficits. Appetite adequate. Remainder of 12 point review of systems obtained negative.    Exam: alert and oriented, conversant, sitting in chair, appears moderately uncomfortable. No HJR. S1 and S2 regular. Pulmonary exam without adventitious sounds. Abdomen without masses tenderness organomegaly. Periphery without edema. Focal tenderness right SI joint, right shoulder incision without erythema or warmth.    Impression and plan:   septic arthritis rt AC joint and right SI joint, continues Rocephin, continues afebrile, increasing pain, trial of Voltaren gel 1% 2 g TID to right buttock, further increase in oxycodone dose as necessary for pain control.   Metastatic prostate cancer, concerns regarding diagnosis reviewed, definitive treatment will be delayed until septic arthritis managed.   Hypertension on Norvasc with satisfactory control of blood pressure.   Multiple concerns are reviewed, discussion with nursing staff and patient.      Electronically signed by: Carmen Hein MD

## 2021-06-01 NOTE — PROGRESS NOTES
[facility] Infectious Disease  Followup Visit        Assessment:   Group B strep bacteremia 9/1, negative BC 9/3  CRP peak  23, declined now to 2.3 with ceftriaxone  AC joint septic arthritis (GH joint OK), aspirated 9/1, positive culture, much better with abx  Status post I&D 9/2, JUWAN Etienne  MRI possible early SIJ septic srthritis, also improved pain  BPH , elevated PSA,likely cancer, never had biopsy, urology following  no discitis on MRI  CT chest abdomen pelvis no other focus    Plan:   IV ceftriaxone x 6 weeks, ending 10/12, and DC picc then  Lets get a followup MRI pelvis  Wrote orders on the TCU form  Otherwise follow-up as needed    Homa Ocasio M.D.          Present Illness:   This is a 68 years old male presenting for hospital follow-up.  He has been on IV ceftriaxone for group B strep bacteremia complicating septic right AC joint and possible early septic arthritis SI joint (seen on MRI but not bone scan) on the right side.  He has been receiving his IV of IV antibiotic at the TCU without any complications.  No fever chills rash itching diarrhea nausea vomiting.  Pain both in the AC joint and the SI joint have improved significantly.  He gets around with a cane.  He does have follow-up with urology for suspicion of prostate cancer.    Review of Systems  Performed and all negative except as mentioned above.    Past medical, family, and social history reviewed, unchanged from before.        Physical Exam:     General Appearance:  Alert, cooperative, no distress, appears stated age    Head:  Normocephalic, without obvious abnormality, atraumatic   Neck no stiffness or adenopathy  Eyes no conjunctivitis or icterus     Cv No edema    shoulder  right shoulder incision healed.  No tenderness.  At the AC joint   hip  right side no direct tenderness       Extremities:  No synovitis    Skin:  Skin color, texture, turgor normal, no rashes or lesions    Neurologic:  Alert and oriented X 3, Moves all 4 extremities         DATA     Results for orders placed or performed in visit on 09/24/19   Comprehensive Metabolic Panel   Result Value Ref Range    Sodium 139 136 - 145 mmol/L    Potassium 4.2 3.5 - 5.0 mmol/L    Chloride 102 98 - 107 mmol/L    CO2 29 22 - 31 mmol/L    Anion Gap, Calculation 8 5 - 18 mmol/L    Glucose 89 70 - 125 mg/dL    BUN 18 8 - 22 mg/dL    Creatinine 1.05 0.70 - 1.30 mg/dL    GFR MDRD Af Amer >60 >60 mL/min/1.73m2    GFR MDRD Non Af Amer >60 >60 mL/min/1.73m2    Bilirubin, Total 0.4 0.0 - 1.0 mg/dL    Calcium 10.1 8.5 - 10.5 mg/dL    Protein, Total 7.8 6.0 - 8.0 g/dL    Albumin 3.1 (L) 3.5 - 5.0 g/dL    Alkaline Phosphatase 101 45 - 120 U/L    AST 24 0 - 40 U/L    ALT 19 0 - 45 U/L   C-Reactive Protein   Result Value Ref Range    CRP 2.3 (H) 0.0 - 0.8 mg/dL   HM1 (CBC with Diff)   Result Value Ref Range    WBC 6.6 4.0 - 11.0 thou/uL    RBC 4.28 (L) 4.40 - 6.20 mill/uL    Hemoglobin 13.0 (L) 14.0 - 18.0 g/dL    Hematocrit 40.4 40.0 - 54.0 %    MCV 94 80 - 100 fL    MCH 30.4 27.0 - 34.0 pg    MCHC 32.2 32.0 - 36.0 g/dL    RDW 12.1 11.0 - 14.5 %    Platelets 224 140 - 440 thou/uL    MPV 11.4 8.5 - 12.5 fL    Neutrophils % 72 (H) 50 - 70 %    Lymphocytes % 13 (L) 20 - 40 %    Monocytes % 11 (H) 2 - 10 %    Eosinophils % 3 0 - 6 %    Basophils % 1 0 - 2 %    Neutrophils Absolute 4.7 2.0 - 7.7 thou/uL    Lymphocytes Absolute 0.9 0.8 - 4.4 thou/uL    Monocytes Absolute 0.8 0.0 - 0.9 thou/uL    Eosinophils Absolute 0.2 0.0 - 0.4 thou/uL    Basophils Absolute 0.0 0.0 - 0.2 siennaou/uL         Homa Ocasio MD

## 2021-06-01 NOTE — PROGRESS NOTES
[facility]    Facility:   [facility] [[id number]]   Code Status: POLST AVAILABLE    68-year-old male TCU patient is seen for review. Admitted to hospital with progressive right shoulder and right lower back pain, diagnosed with group B strep bacteremia, infected right AC and SI joint, metastatic lesions identified T 11 and left seventh rib, PSA elevated at 53, metastatic lesions presumed prostatic in origin, stabilized and transferred to TCU for rehabilitation and management of medical problems which include hypertension, continues IV Rocephin.    Review of systems: evaluated by infectious diseases, will have repeat MRI of SI joint. Continued pain right SI joint, continued weakness right lower extremity. To continue IV antibiotic through 10/12. Denies thoracic or rib discomfort. No fever sweats or chills. No chest pain or palpitations. On one day awakened without pain, otherwise with persistent pain right lower back mildly decreased in intensity. Mood adequate, no cardiac or pulmonary symptoms. Remainder of 12 point review of systems obtained negative.    Exam: alert and oriented, pleasant and cooperative, sitting on bed edge in no apparent distress. Temperature 97.9, 02 97%, blood pressure 120/75, pulse 68 and regular. No facial asymmetry, no cervical adenopathy. S1 and S2 regular. Pulmonary exam without rales rhonchi or wheezes. Abdomen without masses tenderness organomegaly. Trace nonpitting edema lower extremities, trace venous stasis changes. -1 strength right lower extremity. No calf tenderness or swelling. Minimal warmth right EC joint, feeding erythema, no drainage, recent surgical incision site with wound edges well approximated. Focal tenderness right SI joint, no warmth of overlying tissue, no mass.    Impression and plan:   metastatic lesions left seventh rib and T 11, nothing to suggest cord irritation, elevated PSA, will see urology in future regarding definitive  treatment of presumed metastatic prostate cancer.   Group B strept bacteremia with septic joint right AC and right SI, continues IV Rocephin, remains afebrile, recent evaluation by infectious diseases, repeat MRI of SI joint pending.   Hypertension on Norvasc with satisfactory control of blood pressure.   Continued pain right SI joint with weakness right lower extremity, patient attempting to minimize oxycodone use.      Electronically signed by: Carmen Hein MD

## 2021-06-02 ENCOUNTER — RECORDS - HEALTHEAST (OUTPATIENT)
Dept: ADMINISTRATIVE | Facility: CLINIC | Age: 71
End: 2021-06-02

## 2021-06-02 VITALS — HEIGHT: 68 IN | WEIGHT: 214 LBS | BODY MASS INDEX: 32.43 KG/M2

## 2021-06-02 NOTE — PROGRESS NOTES
[facility]    Facility:   [facility] [[id number]]   Code Status: POLST AVAILABLE  68-year-old male is seen for follow up evaluation and discharge planning. Presented to hospital with generalized weakness, low back pain, diagnosed with septic AC and SI joint, bacteremia, will complete course of IV ceftriaxone 10/12, has continued in TCU for rehabilitation and management of hypertension. Course in TCU initially complicated with pain, oxycodone has subsequently been discontinued. Continued mild SI joint pain. Afebrile throughout course in TCU. Presumed metastatic prostate cancer diagnosed during hospitalization, elevated PSA and metastatic lesions to rib and vertebrae, no symptoms related to metastatic lesions. Evaluated by urology 24 hours ago, has MRI of pelvis scheduled,  prostate biopsy scheduled. Course in TCU otherwise uncomplicated. Anticipate discharge to home on 10/13 post completion of antibiotic.    Review of systems: modest right SI joint pain, continued weakness right lower extremity. Denies fever sweats or chills. Highly concerned regarding upcoming prostate biopsy and concerns regarding MRI of pelvis. Denies cardiac or pulmonary symptoms. Generalized fatigue remains present. No sweats or chills. Remainder of 12 point review of systems obtained negative.    Exam: afebrile, vital signs reviewed, alert and oriented, pleasant and conversant. No facial asymmetry. No cervical adenopathy. No pharyngeal erythema. S1 and S2 regular. Pulmonary exam without rales rhonchi or wheezes. Abdomen without masses tenderness organomegaly. Minimal tenderness right SI joint. Incision site right AC joint with wound edges well approximated, no warmth or erythema. Periphery without edema. -1 strength right lower extremity.    Impression and plan:   septic right AC joint and right SI joint status post four week course of IV cephalexin, afebrile, followed by infectious diseases.   Hypertension  with adequate control of blood pressure.   Metastatic prostate cancer, recent urologic evaluation, prostate biopsy and MRI of pelvis pending.   Weakness right lower extremity presumed secondary to SI joint inflammation with nerve irritation, no cord compression suspected.   PICC line to be removed 10/12, discharge to home 10/13.   Follow up will be with regular physician  infectious diseases and urology, total time of discharge evaluation greater than 30 minutes, greater than 50% of time spent in coordination of care and counseling. Discharge medications are as follows:  Current Outpatient Medications:      acetaminophen (TYLENOL) 500 MG tablet, Take 2 tablets (1,000 mg total) by mouth 3 (three) times a day., Disp: , Rfl: 0     amLODIPine (NORVASC) 5 MG tablet, Take 1 tablet (5 mg total) by mouth daily., Disp: , Rfl: 0     docusate sodium (COLACE) 100 MG capsule, Take 1 capsule (100 mg total) by mouth 2 (two) times a day., Disp: , Rfl: 0     ibuprofen (ADVIL,MOTRIN) 200 MG tablet, Take 2 tablets (400 mg total) by mouth every 8 (eight) hours as needed for pain., Disp: , Rfl: 0     polyethylene glycol (MIRALAX) 17 gram packet, Take 1 packet (17 g total) by mouth daily., Disp: , Rfl: 0 Tylenol in use for pain.        Electronically signed by: Carmen Hein MD

## 2021-06-02 NOTE — PROGRESS NOTES
[facility]    Facility:   [facility] [[id number]]   Code Status: POLST AVAILABLE  Reassessment of 68-year-old male with recent hospitalization for septic right AC and SI joint, diagnosed with probable metastatic carcinoma prostate carcinoma during hospitalization, bone mets identified rib and vertebrae asymptomatic, PSA elevated, continues IV antibiotic through 10/12. Oxycodone rarely in use recently discontinued.    Review of systems: denies fever sweats or chills. Continued right leg weakness, slowly improving. Ambulates with a walker, can ambulate independently with difficulty. No urinary symptoms. Continued SI joint pain. No AC joint pain. Generalized fatigue remains present. Remainder 12 point review of systems obtained negative.    Exam: alert and oriented, in no apparent distress. Observed ambulating, weakness right leg, slow gait. PICC line without inflammation. S1 and S2 regular. No cervical adenopathy. Pulmonary exam without rales rhonchi or wheezes. Abdomen without masses or tenderness. Modest SI joint tenderness, warmth right SI joint. Periphery without edema.    Impression and plan:   AC joint and SI joint infection, status post I&D right AC joint, nearing completion of IV antibiotic, remains afebrile.   Weakness right leg, mild improvement.   Probable prostatic metastatic cancer with elevated PSA and bone mets asymptomatic.   Hypertension with adequate control of blood pressure. Upcoming urology appointment.        Electronically signed by: Carmen Hein MD

## 2021-06-02 NOTE — PROGRESS NOTES
[facility]    Facility:   [facility] [[id number]]   Code Status: POLST AVAILABLE    Reassessment of 68-year-old male who presented to hospital septic right AC and SI joint, group B strep septicemia, continues in TCU  on IV Rocephin via PICC line to be continued through 10/12. History of hypertension, elevated PSA in hospital, metastatic lesions thought to be secondary to prostate cancer which has not been evaluated by urology.    Review of systems: continued pain right SI joint, has been seen by infectious diseases with plan to continue IV antibiotic through 10/12. Denies fever sweats or chills. Persistent weakness right lower extremity. No cardiac or pulmonary symptoms. Continues to fully participate in physical therapy. No new joint symptomatology. Remainder of 12 point review of systems obtained negative.    Exam: blood pressure 109/65, temperature 98.6, pulse 77, respiratory 16, 02 96% on room air. Alert and oriented, assumes sitting from supine position with evidence of pain. No pharyngeal erythema. No carotid bruits. S1 and S2 regular. Pulmonary exam without rales rhonchi or wheezes. Abdomen without masses tenderness organomegaly. Surgical site from incision and drainage right AC joint with wound edges well approximated, trace erythema and warmth, no drainage. Trace warmth overlying right SI joint, minimal focal tenderness. Strength right lower extremity -2. Pedal pulses symmetrical. No calf tenderness or swelling.    Impression and plan:  recent group B strep septicemia and septic SI AC joints, continues afebrile, continues Rocephin through 10/12.   Persistent right lower extremity weakness, no focal neurologic deficits of new onset, continues physical therapy.   Hypertension with adequate control of blood pressure   Metastatic vertebral and rib lesions, no pain associated, elevated PSA in hospital, will require urology follow-up post completion of IV  antibiotic.      Electronically signed by: Carmen Hein MD

## 2021-06-02 NOTE — PROGRESS NOTES
[facility] Patient Outreach:     Discharge Date::  10/13/19      Reason for TCU stay (discharge diagnosis)::  Septic AC and SI joints, metastatic prostate cancer      Are you feeling better, the same or worse since your discharge?:  Patient is feeling better          As part of your discharge plan, did they discuss home care with you?: No            Did you receive any new medications, or was there a change to your medications?: No            Do you have any follow up visits scheduled with your PCP or Specialist?:  No          I'm glad to hear you're doing well and we want you to continue to do well. Your PCP would like to see you for a follow-up visit. Can we help set that up for your today?: Yes            (RN) Patient transferred to [facility]? **If immediate concers (e.g. patient is feeling worse and/or not taking new medictations), send in basket message to PCP with quick summary of concern.: Yes

## 2021-06-02 NOTE — PROGRESS NOTES
[facility]    Facility:   [facility] [[id number]]   Code Status: FULL CODE      CHIEF COMPLAINT/REASON FOR VISIT:  Chief Complaint   Patient presents with     Review Of Multiple Medical Conditions       HISTORY:      HPI: Venkatesh is a 68 y.o. male who presented to [facility] ED with right shoulder pain, low back and was found to have R AC septic arthritis.   Remains on IV rocephin until 10/12. Labs weekly per ID.     While hospitalized he was found to have PSA 53 and T11 vertebral body and posterior left second rib metastasis. -to follow up with oncology after antibiotics are complete. On medrol dosepak taper.      New diagnosis of hypertension and is on amlodipine.     Today: Has had concerns with constipation. Would like something scheduled to help keep him regular. Also would like to d/c opiates as his pain has subsided quite a bit and is tolerable. Otherwise he is pleasant, attending therapies, appetite is good. .     Past Medical History:   Diagnosis Date     Osteoarthritis              Family History   Problem Relation Age of Onset     Cancer Mother      CABG Father      Stroke Father      Social History     Socioeconomic History     Marital status: Single     Spouse name: Not on file     Number of children: Not on file     Years of education: Not on file     Highest education level: Not on file   Occupational History     Not on file   Social Needs     Financial resource strain: Not on file     Food insecurity:     Worry: Not on file     Inability: Not on file     Transportation needs:     Medical: Not on file     Non-medical: Not on file   Tobacco Use     Smoking status: Former Smoker     Smokeless tobacco: Never Used   Substance and Sexual Activity     Alcohol use: Yes     Alcohol/week: 1.0 - 3.0 standard drinks     Types: 1 - 3 Cans of beer per week     Comment: 3 times a week.     Drug use: No     Sexual activity: Not Currently   Lifestyle     Physical activity:     Days  per week: Not on file     Minutes per session: Not on file     Stress: Not on file   Relationships     Social connections:     Talks on phone: Not on file     Gets together: Not on file     Attends [employer] service: Not on file     Active member of club or organization: Not on file     Attends meetings of clubs or organizations: Not on file     Relationship status: Not on file     Intimate partner violence:     Fear of current or ex partner: Not on file     Emotionally abused: Not on file     Physically abused: Not on file     Forced sexual activity: Not on file   Other Topics Concern     Not on file   Social History Narrative     Not on file         Review of Systems   Constitutional: Negative for activity change.   HENT: Negative.    Respiratory: Negative.    Cardiovascular: Negative.    Gastrointestinal: Positive for constipation.   Genitourinary: Negative.    Musculoskeletal: Positive for arthralgias and back pain.   Neurological: Negative.        Vitals:    10/09/19 2211   BP: 122/77   Pulse: 86   Temp: 97  F (36.1  C)   SpO2: 96%   Weight: 188 lb (85.3 kg)       Physical Exam  Constitutional:       Appearance: Normal appearance. He is not ill-appearing.   Eyes:      Extraocular Movements: Extraocular movements intact.   Cardiovascular:      Rate and Rhythm: Normal rate and regular rhythm.      Pulses: Normal pulses.      Heart sounds: No murmur.   Pulmonary:      Effort: Pulmonary effort is normal.      Breath sounds: Normal breath sounds. No wheezing.   Abdominal:      General: Abdomen is flat.      Palpations: Abdomen is soft.   Musculoskeletal:         General: Tenderness present.   Skin:     General: Skin is warm and dry.   Neurological:      General: No focal deficit present.      Mental Status: He is alert.           LABS:   Reviewed.     ASSESSMENT:      ICD-10-CM    1. Septic arthritis of AC joint (H) M00.9    2. Right leg weakness R29.898    3. Prostate cancer metastatic to bone (H) C61     C79.51     4. Essential hypertension I10        PLAN:    -Continue amlodipine for now (did question if he needs it long term); discuss with PCP when discharged from TCU.   -D/c oxycodone.   -Start senna S 2 tabs po q day.   -Continue to drink adequate fluids, consume high fiber diet, and ambulate as much as possible to relieve constipation.       Electronically signed by: Heri Brewster CNP

## 2021-06-02 NOTE — TELEPHONE ENCOUNTER
New Appointment Needed  What is the reason for the visit:    Inpatient/ED Follow Up Appt Request  At what hospital or facility were you seen?: [facility]  What is the reason you were seen?: 9/1-9/7 Infection in right shoulder  What date were you admitted?: date: 9/7/19  What date were you discharged?: date: 9/7/19  What was the recommended timeframe for your follow up appointment?: 7-10 days per patient  Provider Preference: PCP only  How soon do you need to be seen?:  10/18 or any day after 10/22/19 - patient declined 10/22/19 appointment  Waitlist offered?: Yes  Okay to leave a detailed message:  Yes [phone]

## 2021-06-02 NOTE — PROGRESS NOTES
[facility]    Facility:   [facility] [[id number]]   Code Status: POLST AVAILABLE      62-year-old male is seen for reevaluation post hospitalization with septic right AC and SI joint, group B strep septicemia, underwent incision and drainage right AC joint, continues IV Rocephin and rehabilitation.    Review of systems: increasing pain right SI joint, continues followed by infectious diseases, continues afebrile. Believes he has increased his activity to an extent that is contributing to increased SI joint pain. No change in weakness right leg. No new pain symptomatology. Denies cardiac or pulmonary symptoms. No focal neurologic deficits of new onset. Remainder of 12 point review of systems obtained negative.    Exam: oriented times three, pleasant and cooperative, in bed in no apparent distress, pain evident by facial expression with movement from supine to sitting position. No facial asymmetry. Pharynx without erythema. No HJR or cervical adenopathy. S1 and S2 regular. Pulmonary exam without rales rhonchi or wheezes. Abdomen without masses or tenderness. Periphery without edema. Pedal pulses symmetrical. -1 strength right lower extremity. Wound edges right AC joint well approximated, trace warmth, no drainage. Trace warmth overlying right SI joint, minimal focal tenderness.    Impression and plan:   septic arthritis right AC and SI joint,  followed by infectious diseases, continues IV Rocephin, remains afebrile.   Hypertension with adequate control of blood pressure.   Suspected prostate cancer with elevated PSA and metastatic lesions identified in hospital to vertebral body and rib, no pain involving these areas.   Persistent right lower extremity weakness, no new neurologic symptoms.   Medications and issues of concern reviewed.    Electronically signed by: Carmen Hein MD

## 2021-06-03 VITALS
OXYGEN SATURATION: 96 % | BODY MASS INDEX: 27.76 KG/M2 | DIASTOLIC BLOOD PRESSURE: 77 MMHG | WEIGHT: 188 LBS | TEMPERATURE: 97 F | SYSTOLIC BLOOD PRESSURE: 122 MMHG | HEART RATE: 86 BPM

## 2021-06-03 VITALS
DIASTOLIC BLOOD PRESSURE: 70 MMHG | WEIGHT: 195.75 LBS | SYSTOLIC BLOOD PRESSURE: 102 MMHG | HEIGHT: 69 IN | HEART RATE: 71 BPM | TEMPERATURE: 98.2 F | OXYGEN SATURATION: 96 % | RESPIRATION RATE: 20 BRPM | BODY MASS INDEX: 28.99 KG/M2

## 2021-06-03 VITALS — HEIGHT: 68 IN | WEIGHT: 217 LBS | BODY MASS INDEX: 32.89 KG/M2

## 2021-06-03 VITALS
BODY MASS INDEX: 29.58 KG/M2 | WEIGHT: 199.7 LBS | WEIGHT: 199.7 LBS | HEIGHT: 69 IN | BODY MASS INDEX: 29.58 KG/M2 | HEIGHT: 69 IN

## 2021-06-03 VITALS — HEART RATE: 72 BPM | SYSTOLIC BLOOD PRESSURE: 106 MMHG | TEMPERATURE: 97.8 F | DIASTOLIC BLOOD PRESSURE: 64 MMHG

## 2021-06-03 NOTE — PROGRESS NOTES
Assessment: /    Plan:    1. Essential hypertension  amLODIPine (NORVASC) 2.5 MG tablet   2. Prostate cancer metastatic to bone (H)  degarelix (FIRMAGON) 80 mg SolR   3. Primary osteoarthritis of right knee         Decrease amlodipine to 2.5 mg daily.    I completed disability parking certificate application due to osteoarthritis of the right knee.    He will be due for colonoscopy in April.      Subjective:    HPI:  Venkatesh Alvarado is a 69-year-old male presenting for follow-up on hypertension.  He was hospitalized September 1 to September 7 due to bacteremia, septic right AC joint and septic right SI joint.  He was found to have bony lesions consistent with metastases from prostate cancer.  PSA was 51.  He was treated with IV antibiotics.  He went to a TCU with a PICC line, and was discharged on October 10.  Amlodipine 5 mg was started during hospitalization.    He underwent prostate biopsy, which demonstrated cancer.  He has been started on antiandrogen therapy, degarelix, receiving 240 mg for the first dose, and now 80 mg every 28 days.    He uses a cane on the steps.      Review of Systems: No fever or cough.      Current Outpatient Medications   Medication Sig Dispense Refill     ibuprofen (ADVIL,MOTRIN) 200 MG tablet Take 2 tablets (400 mg total) by mouth every 8 (eight) hours as needed for pain.  0     acetaminophen (TYLENOL) 500 MG tablet Take 2 tablets (1,000 mg total) by mouth 3 (three) times a day.  0     amLODIPine (NORVASC) 2.5 MG tablet Take 1 tablet (2.5 mg total) by mouth daily. 30 tablet 11     degarelix (FIRMAGON) 80 mg SolR Inject 80 mg under the skin every 28 days.  0     docusate sodium (COLACE) 100 MG capsule Take 1 capsule (100 mg total) by mouth 2 (two) times a day.  0     polyethylene glycol (MIRALAX) 17 gram packet Take 1 packet (17 g total) by mouth daily.  0     No current facility-administered medications for this visit.          Objective:    Vitals:    10/30/19 1039   BP: 102/70    Pulse: 71   Resp: 20   Temp: 98.2  F (36.8  C)   SpO2: 96%       Gen:  NAD, VSS  Lungs:  normal  Heart:  normal          ADDITIONAL HISTORY SUMMARIZED (2): Reviewed hospital and TCU discharge summaries, urology consultation.  DECISION TO OBTAIN EXTRA INFORMATION (1): None.   RADIOLOGY TESTS (1): None.  LABS (1): None.  MEDICINE TESTS (1): None.  INDEPENDENT REVIEW (2 each): None.     Total Data Points: 2

## 2021-06-04 VITALS
RESPIRATION RATE: 22 BRPM | DIASTOLIC BLOOD PRESSURE: 70 MMHG | WEIGHT: 200.5 LBS | TEMPERATURE: 97.8 F | HEIGHT: 68 IN | HEART RATE: 69 BPM | SYSTOLIC BLOOD PRESSURE: 120 MMHG | OXYGEN SATURATION: 97 % | BODY MASS INDEX: 30.39 KG/M2

## 2021-06-05 VITALS
HEART RATE: 62 BPM | DIASTOLIC BLOOD PRESSURE: 78 MMHG | WEIGHT: 208 LBS | SYSTOLIC BLOOD PRESSURE: 136 MMHG | BODY MASS INDEX: 31.52 KG/M2 | OXYGEN SATURATION: 98 % | TEMPERATURE: 98.5 F | HEIGHT: 68 IN

## 2021-06-06 NOTE — PROGRESS NOTES
Assessment and Plan:       Venkatesh was seen today for annual wellness visit.    Routine general medical examination at a health care facility    Essential hypertension    Prostate cancer metastatic to bone (H)    Cough    Need for vaccination  -     Pneumococcal polysaccharide vaccine 23-valent 1 yo or older, subq/IM    Recheck if cough worsens or does not resolve.      The patient's current medical problems were reviewed.      The following health maintenance schedule was reviewed with the patient and provided in printed form in the after visit summary:   Health Maintenance   Topic Date Due     HEPATITIS C SCREENING  1950     ADVANCE CARE PLANNING  10/26/1969     ZOSTER VACCINES (2 of 3) 09/16/2014     MEDICARE ANNUAL WELLNESS VISIT  02/23/2019     LIPID  07/22/2019     COLORECTAL CANCER SCREENING  04/01/2020     TD 18+ HE  04/13/2020     FALL RISK ASSESSMENT  03/10/2021     PNEUMOCOCCAL IMMUNIZATION 65+ LOW/MEDIUM RISK  Completed     INFLUENZA VACCINE RULE BASED  Completed        Subjective:   Chief Complaint: Venkatesh Alvarado is an 69 y.o. male here for an Annual Wellness visit.     HPI:  Treatment for metastatic prostate cancer.  Takes dexamethasone 4 mg.  Has not needed prochlorperazine 10 mg.    Labwork done by oncologist.    Cough for 2 weeks, getting better.  No fever.  Occasional yellow sputum.    He takes vitamin D, 2 two times a day.    He has not been bowling due to right shoulder pain still.    He plans to travel to [place], [place] 3/21 to watch his son in a tournament.      Review of Systems:    Please see above.  The rest of the review of systems are negative for all systems.    Patient Care Team:  Siva Rios MD as PCP - General  Siva Rios MD as Assigned PCP     Patient Active Problem List   Diagnosis     Pituitary Neoplasm     Prostate cancer metastatic to bone (H)     Essential hypertension     Primary osteoarthritis of right knee     Past Medical History:   Diagnosis Date      Osteoarthritis       Past Surgical History:   Procedure Laterality Date     INGUINAL HERNIA REPAIR Right      PICC AND MIDLINE TEAM LINE INSERTION  9/5/2019           Family History   Problem Relation Age of Onset     Cancer Mother      CABG Father      Stroke Father       Social History     Socioeconomic History     Marital status: Single     Spouse name: Not on file     Number of children: Not on file     Years of education: Not on file     Highest education level: Not on file   Occupational History     Not on file   Social Needs     Financial resource strain: Not on file     Food insecurity     Worry: Not on file     Inability: Not on file     Transportation needs     Medical: Not on file     Non-medical: Not on file   Tobacco Use     Smoking status: Former Smoker     Packs/day: 0.00     Smokeless tobacco: Never Used     Tobacco comment: Quick 30 years ago.   Substance and Sexual Activity     Alcohol use: Yes     Alcohol/week: 1.0 - 3.0 standard drinks     Types: 1 - 3 Cans of beer per week     Comment: 3 times a week.     Drug use: No     Sexual activity: Not Currently     Partners: Female   Lifestyle     Physical activity     Days per week: Not on file     Minutes per session: Not on file     Stress: Not on file   Relationships     Social connections     Talks on phone: Not on file     Gets together: Not on file     Attends Mandaen service: Not on file     Active member of club or organization: Not on file     Attends meetings of clubs or organizations: Not on file     Relationship status: Not on file     Intimate partner violence     Fear of current or ex partner: Not on file     Emotionally abused: Not on file     Physically abused: Not on file     Forced sexual activity: Not on file   Other Topics Concern     Not on file   Social History Narrative     Not on file      Current Outpatient Medications   Medication Sig Dispense Refill     acetaminophen (TYLENOL) 500 MG tablet Take 2 tablets (1,000 mg total) by  "mouth 3 (three) times a day.  0     amLODIPine (NORVASC) 2.5 MG tablet Take 1 tablet (2.5 mg total) by mouth daily. 30 tablet 11     docusate sodium (COLACE) 100 MG capsule Take 1 capsule (100 mg total) by mouth 2 (two) times a day.  0     ibuprofen (ADVIL,MOTRIN) 200 MG tablet Take 2 tablets (400 mg total) by mouth every 8 (eight) hours as needed for pain.  0     polyethylene glycol (MIRALAX) 17 gram packet Take 1 packet (17 g total) by mouth daily.  0     degarelix (FIRMAGON) 80 mg SolR Inject 80 mg under the skin every 28 days.  0     No current facility-administered medications for this visit.       Objective:   Vital Signs:   Visit Vitals  /70   Pulse 69   Temp 97.8  F (36.6  C) (Oral)   Resp 22   Ht 5' 7.84\" (1.723 m)   Wt 200 lb 8 oz (90.9 kg)   SpO2 97%   BMI 30.63 kg/m         VisionScreening:  Normal vision    PHYSICAL EXAM  Eyes: EOM full, pupils normal, conjunctivae normal  Ears: TM's and canals normal  Oropharynx: normal  Neck: supple without adenopathy or thyromegaly  Lungs: normal  Heart: regular rhythm, normal rate, no murmur  Abdomen: no HSM, mass or tenderness  Extremities: FROM, normal strength and sensation      Assessment Results 3/10/2020   Activities of Daily Living No help needed   Instrumental Activities of Daily Living No help needed   Mini Cog Total Score 5   Some recent data might be hidden     A Mini-Cog score of 0-2 suggests the possibility of dementia, score of 3-5 suggests no dementia    Identified Health Risks:     The patient was provided with suggestions to help him develop a healthy physical lifestyle.   Patient's advanced directive was discussed and I am comfortable with the patient's wishes.        "

## 2021-06-06 NOTE — PATIENT INSTRUCTIONS - HE
Patient Education     Your Health Risk Assessment indicates you feel you are not in good physical health.    A healthy lifestyle helps keep the body fit and the mind alert. It helps protect you from disease, helps you fight disease, and helps prevent chronic disease (disease that doesn't go away) from getting worse. This is important as you get older and begin to notice twinges in muscles and joints and a decline in the strength and stamina you once took for granted. A healthy lifestyle includes good healthcare, good nutrition, weight control, recreation, and regular exercise. Avoid harmful substances and do what you can to keep safe. Another part of a healthy lifestyle is stay mentally active and socially involved.    Good healthcare     Have a wellness visit every year.     If you have new symptoms, let us know right away. Don't wait until the next checkup.     Take medicines exactly as prescribed and keep your medicines in a safe place. Tell us if your medicine causes problems.   Healthy diet and weight control     Eat 3 or 4 small, nutritious, low-fat, high-fiber meals a day. Include a variety of fruits, vegetables, and whole-grain foods.     Make sure you get enough calcium in your diet. Calcium, vitamin D, and exercise help prevent osteoporosis (bone thinning).     If you live alone, try eating with others when you can. That way you get a good meal and have company while you eat it.     Try to keep a healthy weight. If you eat more calories than your body uses for energy, it will be stored as fat and you will gain weight.     Recreation   Recreation is not limited to sports and team events. It includes any activity that provides relaxation, interest, enjoyment, and exercise. Recreation provides an outlet for physical, mental, and social energy. It can give a sense of worth and achievement. It can help you stay healthy.         Advance Directive  Patient s advance directive was discussed and I am comfortable  with the patient s wishes.  Patient Education   Personalized Prevention Plan  You are due for the preventive services outlined below.  Your care team is available to assist you in scheduling these services.  If you have already completed any of these items, please share that information with your care team to update in your medical record.  Health Maintenance   Topic Date Due     HEPATITIS C SCREENING  1950     ADVANCE CARE PLANNING  10/26/1969     ZOSTER VACCINES (2 of 3) 09/16/2014     MEDICARE ANNUAL WELLNESS VISIT  02/23/2019     LIPID  07/22/2019     COLORECTAL CANCER SCREENING  04/01/2020     TD 18+ HE  04/13/2020     FALL RISK ASSESSMENT  03/10/2021     PNEUMOCOCCAL IMMUNIZATION 65+ LOW/MEDIUM RISK  Completed     INFLUENZA VACCINE RULE BASED  Completed

## 2021-06-07 NOTE — TELEPHONE ENCOUNTER
"RN Triage:    Finished 6 rounds of chemo on 4/20/20.    Areas of flat, red and purple discolored \"spots\" and \"blotches\" on both shins which extends around inside of leg to calves x 2 weeks.  No pain or swelling.  Afebrile.  No changes in external appearance of feet.  Does not bother him at all.  No history of same.  No history of blood clots.    Request:  Please call patient to set up virtual visit for 4/29/20.    Ann Anguiano, RN   [facility]          Reason for Disposition    [1] Localized purple or blood-colored spots or dots AND [2] not from injury or friction AND [3] no fever    Protocols used: RASH OR REDNESS - [id number]      "

## 2021-06-07 NOTE — PROGRESS NOTES
"Venkatesh Alvarado is a 69 y.o. male who is being evaluated via a billable video visit.      The patient has been notified of following:     \"This video visit will be conducted via a call between you and your physician/provider. We have found that certain health care needs can be provided without the need for an in-person physical exam.  This service lets us provide the care you need with a video conversation.  If a prescription is necessary we can send it directly to your pharmacy.  If lab work is needed we can place an order for that and you can then stop by our lab to have the test done at a later time.    Video visits are billed at different rates depending on your insurance coverage. Please reach out to your insurance provider with any questions.    If during the course of the call the physician/provider feels a video visit is not appropriate, you will not be charged for this service.\"    Patient has given verbal consent to a Video visit? Yes    Patient would like to receive their AVS by AVS Preference: Carlos.    Patient would like the video invitation sent by: Text to cell phone: [phone]    Will anyone else be joining your video visit? No        Video Start Time: 2:53 pm    Additional provider notes:     Red and purple spots on lower legs for 2 weeks.  No trauma.  Not itchy.  Not increasing in size.  No calf pain or swelling.  Has been having chemotherapy.  No epistaxis or other signs of bleeding.  No cough or dyspnea.    General: no acute distress.  Speech: normal rate and content, speaks in full sentences without dyspnea.  Eyes appear normal  Legs: no edema.  Scattered red patches on lower legs, and a few purple patches.  No scaling, no central clearing.  No pettecchiae.  No pain with flexion of ankles.    Assessment:    1. Dermatitis       Plan:  Observe.  Does not appear to be purpura, or infectious.  No sign of DVT.    Call if any problem.      Video-Visit Details    Type of service:  Video " Visit    Video End Time (time video stopped): 3:08 pm  Originating Location (pt. Location): Home    Distant Location (provider location):  [facility] MEDICINE/OB      Platform used for Video Visit: Rosa Rios MD

## 2021-06-07 NOTE — TELEPHONE ENCOUNTER
CMT called patient and helped the patient download for [facility]. Patient and CMT need to know how soon the visits needs to be, if need virtual visit, would doctor recommend time.

## 2021-06-08 NOTE — PROGRESS NOTES
HISTORY OF PRESENT ILLNESS  Patient reports that he is here for removal of wax from the ear.  No change in his baseline hearing.  No vertigo.  No drainage.      REVIEW OF SYSTEMS  Review of Systems: a 10-system review was performed. Pertinent positives are noted in the HPI and on a separate scanned document in the chart.    PMH, PSH, FH and SH has documented in the EHR.      EXAM    CONSTITUTIONAL  General Appearance:  Normal, well developed, well nourished, no obvious distress  Ability to Communicate:  communicates appropriately.    HEAD AND FACE  Appearance and Symmetry:  Normal, no scalp or facial scarring or suspicious lesions.    EARS  Clinical speech reception threshold:  Normal, able to hear normal speech.  Auricle:  Normal, Auricles without scars, lesions, masses.  External auditory canal:  Cerumen impaction.  Both ears were debrided using a combination of curettage and suction.  Tympanic membrane:  Normal, Tympanic membranes normal without swelling or erythema.  Tympanic membrane mobility:  Normal, Normal tympanic membrane mobility.      IMPRESSION  Cerumen impaction    RECOMMENDATION  Follow up as needed.    Jaime Horton MD

## 2021-06-08 NOTE — PROGRESS NOTES
ASSESMENT AND PLAN:  Diagnoses and all orders for this visit:    Plantar fasciitis  Improving with heel cups and naproxen.  He will continue with this treatment plan and we did discuss the possibility of doing a steroid injection if he has worsening symptoms.    Skin neoplasm  Reviewed options today with the patient including observation, cryotherapy, referral to dermatology.  At this point, he elects for observation.  As documented below he has a 5 mm maximal diameter lesion on the right cheek without any atypical features.  We discussed indications for follow-up.    Cerumen Impaction  Unable to alleviate it today with the use of a curette and alligator clamp.  Discussed options including the use of over-the-counter wax drops followed by irrigation here in the clinic versus referral and he would like to proceed with referral as below.  Patient will call to schedule.  -     Ambulatory referral to ENT    Right hamstring muscle strain  Stretching routine recommended and demonstrated for the patient today.  We discussed the slow natural history of improvement with this and modified activities and he will follow-up if not following expected course.        SUBJECTIVE: 66-year-old male who has had chronic problems with ear wax on both sides.  Over the last couple of months he's noticed muffled hearing on both sides along with some wax coming from his ears.  He has tried home irrigation without any success.  He is not having ear pain or fever.  Patient also reports a skin spot on his right cheek that he thinks started about 4 or 5 months ago.  He does not think it's getting bigger or changing in shape or color but had a skin cancer diagnosed in one of his friends and wants to have this looked at.  It has not been bleeding or ulcerating.  Patient is also concerned by some pain on the posterior aspect of his right thigh.  Pain has been mild to moderate in severity and has been slowly improving over this last few weeks.  He  "cannot recall any specific injury around the time that it started.  It does get worse with particular movements such as when he goes bowling.  He has also had some pain on the plantar aspect of the heel of the right foot.  He started using some heel cups and the pain on the plantar aspect has improved but seems to have migrated up to the lateral heel and ankle area.    Past Medical History   Diagnosis Date     Osteoarthritis      Patient Active Problem List   Diagnosis     Pituitary Neoplasm     Cerumen Impaction     Current Outpatient Prescriptions   Medication Sig Dispense Refill     naproxen sodium (ALEVE) 220 MG tablet Take 220-440 mg by mouth every 12 (twelve) hours as needed for pain.       No current facility-administered medications for this visit.      History   Smoking Status     Former Smoker   Smokeless Tobacco     Never Used       OBJECTICE:   Visit Vitals     /82     Pulse 78     Temp 97.6  F (36.4  C) (Oral)     Resp 24     Ht 5' 9\" (1.753 m)     Wt 214 lb (97.1 kg)     SpO2 97%     BMI 31.6 kg/m2        No results found for this or any previous visit (from the past 24 hour(s)).     GEN-alert, appropriate, in no apparent distress   HEENT-external auditory canals occluded bilaterally by wax.  Using a curette and light and alligator we were unable to get the wax out because it breaks apart easily and does not come on and one called.  Patient tolerated the attempted procedure well, there was no pain and I did not contact the eardrum.   CV-regular rate and rhythm with no murmur   RESP-lungs clear to auscultation   Musculoskeletal-good range of motion and strength on testing of his right hamstrings.  He does have some pain on palpation of the hamstring insertion area in the buttock area.  He also has some pain with palpation of the heel laterally and medially but no exquisite pain with direct palpation of the insertion of the plantar fascia.   EXTREM-warm with no edema   SKIN-5 mm slightly raised " flesh-colored skin spot on the right cheek just posterior to a larger raised skin nevus at the patient reports he's had unchanged for years.      Jose Alberto Jenkins

## 2021-06-12 NOTE — TELEPHONE ENCOUNTER
Refill Approved    Rx renewed per Medication Renewal Policy. Medication was last renewed on 10/30/19.    Elizabeth Teague, [facility] Triage/Med Refill 11/9/2020     Requested Prescriptions   Pending Prescriptions Disp Refills     amLODIPine (NORVASC) 2.5 MG tablet [Pharmacy Med Name: AMLODIPINE BESYLATE 2.5 MG TAB] 30 tablet 11     Sig: TAKE 1 TABLET BY MOUTH DAILY       Calcium-Channel Blockers Protocol Passed - 11/7/2020 12:13 AM        Passed - PCP or prescribing provider visit in past 12 months or next 3 months     Last office visit with prescriber/PCP: 10/30/2019 Siva Rios MD OR same dept: Visit date not found OR same specialty: 10/30/2019 Siva Rios MD  Last physical: 3/10/2020 Last MTM visit: Visit date not found   Next visit within 3 mo: Visit date not found  Next physical within 3 mo: Visit date not found  Prescriber OR PCP: Siva Rios MD  Last diagnosis associated with med order: 1. Essential hypertension  - amLODIPine (NORVASC) 2.5 MG tablet [Pharmacy Med Name: AMLODIPINE BESYLATE 2.5 MG TAB]; TAKE 1 TABLET BY MOUTH DAILY  Dispense: 30 tablet; Refill: 11    If protocol passes may refill for 12 months if within 3 months of last provider visit (or a total of 15 months).             Passed - Blood pressure filed in past 12 months     BP Readings from Last 1 Encounters:   03/10/20 120/70

## 2021-06-16 ENCOUNTER — AMBULATORY - HEALTHEAST (OUTPATIENT)
Dept: FAMILY MEDICINE | Facility: CLINIC | Age: 71
End: 2021-06-16

## 2021-06-16 DIAGNOSIS — I10 ESSENTIAL HYPERTENSION: ICD-10-CM

## 2021-06-16 PROBLEM — M17.11 PRIMARY OSTEOARTHRITIS OF RIGHT KNEE: Status: ACTIVE | Noted: 2019-11-08

## 2021-06-16 PROBLEM — D35.2 PITUITARY ADENOMA (H): Status: ACTIVE | Noted: 2021-05-02

## 2021-06-16 RX ORDER — AMLODIPINE BESYLATE 2.5 MG/1
5 TABLET ORAL DAILY
Qty: 90 TABLET | Refills: 1 | Status: SHIPPED
Start: 2021-06-16 | End: 2021-09-29

## 2021-06-16 NOTE — PROGRESS NOTES
Assessment:      Healthy male exam.      1. Routine general medical examination at a health care facility     2. Elevated blood pressure reading  HM2(CBC w/o Differential)    Comprehensive Metabolic Panel   3. Need for vaccination  Pneumococcal conjugate vaccine 13-valent 6wks-17yrs; >50yrs          Plan:       Recheck BP in 1 month.     Subjective:      Venkatesh Alvarado is a 67 y.o. male who presents for an annual exam. The patient reports that there is not domestic violence in his life.     Blood pressure was 160/110 at the dentist recently.    He walks 3.5 miles 3 times per week.    He bowls 3 times per week.  He lifts weights at home.    He has not taken Aleve in the past 5 days.    His left hip gets sore when bowling.    He has nocturia ×2.      Healthy Habits:   Regular Exercise: Yes  Sunscreen Use: No  Healthy Diet: Yes  Dental Visits Regularly: Yes  Seat Belt: Yes  Sexually active: No  Monthly Self Testicular Exams:  Yes  Hemoccults: No  Flex Sig: No  Colonoscopy: Yes  Lipid Profile: Yes  Glucose Screen: Yes  Prevention of Osteoporosis: No  Last Dexa: No  Guns at Home:  Yes  Guns Safety Locks:  Yes      Immunization History   Administered Date(s) Administered     Hep A, historic 04/13/2010, 10/26/2010     Influenza, inj, historic,unspecified 09/21/2011     Influenza,seasonal quad, PF 10/25/2013     Pneumo Conj 13-V (2010&after) 02/23/2018     Tdap 04/13/2010     ZOSTER, LIVE 07/22/2014     Immunization status: up to date and documented.    No exam data present     Current Outpatient Prescriptions   Medication Sig Dispense Refill     naproxen sodium (ALEVE) 220 MG tablet Take 220-440 mg by mouth every 12 (twelve) hours as needed for pain.       No current facility-administered medications for this visit.      Past Medical History:   Diagnosis Date     Osteoarthritis      Past Surgical History:   Procedure Laterality Date     INGUINAL HERNIA REPAIR Right      Review of patient's allergies indicates no known  "allergies.  Family History   Problem Relation Age of Onset     Cancer Mother      CABG Father      Stroke Father      Social History     Social History     Marital status: Single     Spouse name: N/A     Number of children: N/A     Years of education: N/A     Occupational History     Not on file.     Social History Main Topics     Smoking status: Former Smoker     Smokeless tobacco: Never Used     Alcohol use 0.6 - 1.8 oz/week     1 - 3 Cans of beer per week      Comment: 3 times a week.     Drug use: No     Sexual activity: No     Other Topics Concern     Not on file     Social History Narrative       Review of Systems  Review of Systems   Constitutional: Negative.    HENT: Negative.    Eyes: Negative.    Respiratory: Negative.    Cardiovascular: Negative.    Gastrointestinal: Negative.              Objective:     Vitals:    02/23/18 1545   BP: 136/86   Pulse: 75   Resp: 20   Temp: 98.2  F (36.8  C)   TempSrc: Oral   SpO2: 94%   Weight: 217 lb 8 oz (98.7 kg)   Height: 5' 7.84\" (1.723 m)     Body mass index is 33.23 kg/(m^2).    Physical  Physical Exam     Wt increased 3# in past year.  Eyes: EOM full, pupils normal, conjunctivae normal  Ears: 80% wax bilateral  Oropharynx: normal  Neck: supple without adenopathy or thyromegaly  Lungs: normal  Heart: regular rhythm, normal rate, no murmur  Abdomen: no HSM, mass or tenderness  : circumcised, penis and testes normal, no inguinal hernia or adenopathy  Rectal: mild BPH, no nodule  Extremities: FROM, normal strength and sensation        "

## 2021-06-17 NOTE — PROGRESS NOTES
"    Assessment & Plan     Venkatesh was seen today for hypertension.    Diagnoses and all orders for this visit:    TIA (transient ischemic attack)  -     aspirin 81 MG EC tablet; Take 1 tablet (81 mg total) by mouth daily.    Essential hypertension    Prostate cancer metastatic to bone (H)    Pituitary adenoma (H)      Begin aspirin 81 mg daily.    He will obtain BP machine.  Call if usually high.  Consider increase of amlodipine then.      37 minutes spent on the date of the encounter doing chart review, review of test results and patient visit regarding TIA, hypertension, prostate cancer, pituitary adenoma.       BMI:   Estimated body mass index is 31.63 kg/m  as calculated from the following:    Height as of this encounter: 5' 8\" (1.727 m).    Weight as of this encounter: 208 lb (94.3 kg).       Return in about 1 year (around 4/27/2022) for Annual physical.    Siva Rios MD  [facility]    Subjective   Venkatesh Alvarado is 70 y.o. and presents today for the following health issues   HPI     Had left hand tingling 4/16/21, was at ER.  CTA, MRI.  BP was 212/103.  10 mm pituitary adenoma.  Had been 9 mm in 2011.    Walks 2-3 miles 4 times per week.    Has prostate cancer with bone mets, sees oncologist.    Weight increased 8 # in 1 year.      Current Outpatient Medications   Medication Sig Dispense Refill     amLODIPine (NORVASC) 2.5 MG tablet TAKE 1 TABLET BY MOUTH DAILY 90 tablet 1     cholecalciferol, vitamin D3, 1,000 unit (25 mcg) tablet Take 2,000 Units by mouth daily.       denosumab (XGEVA) 120 mg/1.7 mL (70 mg/mL) Soln Inject 120 mg under the skin every 30 (thirty) days.        docusate sodium (COLACE) 100 MG capsule Take 100 mg by mouth 2 (two) times a day as needed for constipation.       enzalutamide (XTANDI) 40 mg cap Take 160 mg by mouth daily.       leuprolide, 6 month, (ELIGARD, 6 MONTH,) 45 mg syringe Inject 45 mg under the skin every 6 (six) months.        aspirin 81 MG " "EC tablet Take 1 tablet (81 mg total) by mouth daily.  0     No current facility-administered medications for this visit.          Review of Systems  No chest pain or dyspnea.      Objective    /78 (Patient Site: Left Arm, Patient Position: Sitting, Cuff Size: Adult Regular)   Pulse 62   Temp 98.5  F (36.9  C) (Oral)   Ht 5' 8\" (1.727 m)   Wt 208 lb (94.3 kg)   SpO2 98%   BMI 31.63 kg/m    Body mass index is 31.63 kg/m .  Physical Exam  Heart normal  Lungs normal  Tinel's and Phalen's tests negative bilateral              "

## 2021-06-17 NOTE — TELEPHONE ENCOUNTER
Pt call back to follow-up on status of this request.  He schedule a telephone visit with you for tomorrow at 1020am. He starts a new job tomorrow and would like call no later than 12pm.

## 2021-06-17 NOTE — TELEPHONE ENCOUNTER
Reason for Call:  Other FYI     Detailed comments: Caller wanted to let Siva Rios MD  that his Blood pressure readings are around the 150/90 and sometimes higher as well.     Phone Number Patient can be reached at: Home number on file [phone] (home)    Best Time: anytime    Can we leave a detailed message on this number?: Yes    Call taken on 5/11/2021 at 11:39 AM by Lis Hicks

## 2021-06-17 NOTE — TELEPHONE ENCOUNTER
Reason for Call:  Medication or medication refill:    Do you use a [facility]?  Name of the pharmacy and phone number for the current request: [facility][id number]    Name of the medication requested: Amlodipine     Other request: n.a    Can we leave a detailed message on this number? No call back needed    Phone number patient can be reached at: n/a    Best Time: n/a    Call taken on 5/11/2021 at 11:36 AM by Lis Hicks

## 2021-06-17 NOTE — TELEPHONE ENCOUNTER
RN cannot approve Refill Request    RN can NOT refill this medication med is not covered by policy/route to provider. Last office visit: 4/27/2021 Siva Rios MD Last Physical: 3/10/2020 Last MTM visit: Visit date not found Last visit same specialty: Visit date not found.  Next visit within 3 mo: Visit date not found  Next physical within 3 mo: Visit date not found      Denise King, [facility] Triage/Med Refill 5/11/2021    Requested Prescriptions   Pending Prescriptions Disp Refills     amLODIPine (NORVASC) 2.5 MG tablet 90 tablet 1     Sig: Take 1 tablet (2.5 mg total) by mouth daily.       There is no refill protocol information for this order

## 2021-06-17 NOTE — PROGRESS NOTES
Assessment: /    Plan:    1. Elevated blood pressure reading         He will return for a physical next year.      Subjective:    HPI:  Venkatesh Alvarado is a 67-year-old male presenting for follow-up on elevated blood pressure reading.  He has been feeling well.  Recent lab work was normal.        Review of Systems: No cough or chest pain.      Current Outpatient Prescriptions   Medication Sig Dispense Refill     naproxen sodium (ALEVE) 220 MG tablet Take 220-440 mg by mouth every 12 (twelve) hours as needed for pain.       No current facility-administered medications for this visit.          Objective:    Vitals:    03/26/18 0902   BP: 132/76   Pulse: 66   Resp: 19   Temp: 97.9  F (36.6  C)   SpO2: 96%       Gen:  NAD, VSS  Lungs:  normal  Heart:  normal          ADDITIONAL HISTORY SUMMARIZED (2): None.  DECISION TO OBTAIN EXTRA INFORMATION (1): None.   RADIOLOGY TESTS (1): None.  LABS (1): None.  MEDICINE TESTS (1): None.  INDEPENDENT REVIEW (2 each): None.     Total Data Points: 0

## 2021-06-19 NOTE — LETTER
Letter by Carmen Hein MD at      Author: Carmen Hein MD Service: -- Author Type: --    Filed:  Encounter Date: 10/10/2019 Status: Signed         Patient: Venkatesh Alvarado   MR Number: [id number]   YOB: 1950   Date of Visit: 10/10/2019     [facility]    Facility:   [facility] [[id number]]   Code Status: POLST AVAILABLE  68-year-old male is seen for follow up evaluation and discharge planning. Presented to hospital with generalized weakness, low back pain, diagnosed with septic AC and SI joint, bacteremia, will complete course of IV ceftriaxone 10/12, has continued in TCU for rehabilitation and management of hypertension. Course in TCU initially complicated with pain, oxycodone has subsequently been discontinued. Continued mild SI joint pain. Afebrile throughout course in TCU. Presumed metastatic prostate cancer diagnosed during hospitalization, elevated PSA and metastatic lesions to rib and vertebrae, no symptoms related to metastatic lesions. Evaluated by urology 24 hours ago, has MRI of pelvis scheduled,  prostate biopsy scheduled. Course in TCU otherwise uncomplicated. Anticipate discharge to home on 10/13 post completion of antibiotic.    Review of systems: modest right SI joint pain, continued weakness right lower extremity. Denies fever sweats or chills. Highly concerned regarding upcoming prostate biopsy and concerns regarding MRI of pelvis. Denies cardiac or pulmonary symptoms. Generalized fatigue remains present. No sweats or chills. Remainder of 12 point review of systems obtained negative.    Exam: afebrile, vital signs reviewed, alert and oriented, pleasant and conversant. No facial asymmetry. No cervical adenopathy. No pharyngeal erythema. S1 and S2 regular. Pulmonary exam without rales rhonchi or wheezes. Abdomen without masses tenderness organomegaly. Minimal tenderness right SI joint. Incision site right AC joint with wound edges well  approximated, no warmth or erythema. Periphery without edema. -1 strength right lower extremity.    Impression and plan:   septic right AC joint and right SI joint status post four week course of IV cephalexin, afebrile, followed by infectious diseases.   Hypertension with adequate control of blood pressure.   Metastatic prostate cancer, recent urologic evaluation, prostate biopsy and MRI of pelvis pending.   Weakness right lower extremity presumed secondary to SI joint inflammation with nerve irritation, no cord compression suspected.   PICC line to be removed 10/12, discharge to home 10/13.   Follow up will be with regular physician  infectious diseases and urology, total time of discharge evaluation greater than 30 minutes, greater than 50% of time spent in coordination of care and counseling. Discharge medications are as follows:  Current Outpatient Medications:   ?  acetaminophen (TYLENOL) 500 MG tablet, Take 2 tablets (1,000 mg total) by mouth 3 (three) times a day., Disp: , Rfl: 0  ?  amLODIPine (NORVASC) 5 MG tablet, Take 1 tablet (5 mg total) by mouth daily., Disp: , Rfl: 0  ?  docusate sodium (COLACE) 100 MG capsule, Take 1 capsule (100 mg total) by mouth 2 (two) times a day., Disp: , Rfl: 0  ?  ibuprofen (ADVIL,MOTRIN) 200 MG tablet, Take 2 tablets (400 mg total) by mouth every 8 (eight) hours as needed for pain., Disp: , Rfl: 0  ?  polyethylene glycol (MIRALAX) 17 gram packet, Take 1 packet (17 g total) by mouth daily., Disp: , Rfl: 0 Tylenol in use for pain.        Electronically signed by: Caremn Hein MD

## 2021-06-19 NOTE — LETTER
Letter by Carmen Hein MD at      Author: Carmen Hein MD Service: -- Author Type: --    Filed:  Encounter Date: 9/24/2019 Status: Signed         Patient: Venkatesh Alvarado   MR Number: [id number]   YOB: 1950   Date of Visit: 9/24/2019     [facility]    Facility:   [facility] [[id number]]   Code Status: POLST AVAILABLE    68 year old male admitted to hospital with septic arthritis right SI and AC joint, metastatic lesions T 11 and left rib  presumed prostatic in origin, I&D right AC joint, transferred to TCU on IV Rocephin.    Review of systems: continued weakness right lower extremity, pain right SI joint remains highly symptomatic, mild decrease in intensity. No vertebral or rib discomfort. Modest discomfort right shoulder. No fever sweats or chills. No cardiac or pulmonary symptoms. Minimal use of oxycodone for pain. Remainder of 12 point review of systems obtained negative.    Exam: afebrile, vital signs reviewed over past four days. Pleasant and cooperative, sitting in lounger no apparent distress. Observed ambulating, drags right leg, -2 strength right lower extremity. S1 and S2 regular. Pulmonary exam without rales rhonchi or wheezes. No cervical adenopathy. Abdomen without masses tenderness organomegaly. Periphery without edema, trace venous stasis changes present pedal pulses intact. Focal tenderness right SI joint, previous incision right shoulder with wound edges well approximated, trace warmth, no drainage.    Impression and plan:   group B strep right AC and right SI joint, continued pain right SI joint, remains afebrile, continues IV Rocephin, followed by infectious diseases.   Hypertension on Norvasc with adequate control of blood pressure.   Pain control adequate with oxycodone PRN.   Metastatic lesions T 11 and left rib, asymptomatic, presumed prostatic in the etiology. No cord symptoms. Continue Rocephin and  rehabilitation.      Electronically signed by: Carmen Hein MD

## 2021-06-19 NOTE — LETTER
Letter by Carmen Hein MD at      Author: Carmen Hein MD Service: -- Author Type: --    Filed:  Encounter Date: 10/3/2019 Status: Signed         Patient: Venkatesh Alvarado   MR Number: [id number]   YOB: 1950   Date of Visit: 10/3/2019     [facility]    Facility:   [facility] [[id number]]   Code Status: POLST AVAILABLE      62-year-old male is seen for reevaluation post hospitalization with septic right AC and SI joint, group B strep septicemia, underwent incision and drainage right AC joint, continues IV Rocephin and rehabilitation.    Review of systems: increasing pain right SI joint, continues followed by infectious diseases, continues afebrile. Believes he has increased his activity to an extent that is contributing to increased SI joint pain. No change in weakness right leg. No new pain symptomatology. Denies cardiac or pulmonary symptoms. No focal neurologic deficits of new onset. Remainder of 12 point review of systems obtained negative.    Exam: oriented times three, pleasant and cooperative, in bed in no apparent distress, pain evident by facial expression with movement from supine to sitting position. No facial asymmetry. Pharynx without erythema. No HJR or cervical adenopathy. S1 and S2 regular. Pulmonary exam without rales rhonchi or wheezes. Abdomen without masses or tenderness. Periphery without edema. Pedal pulses symmetrical. -1 strength right lower extremity. Wound edges right AC joint well approximated, trace warmth, no drainage. Trace warmth overlying right SI joint, minimal focal tenderness.    Impression and plan:   septic arthritis right AC and SI joint,  followed by infectious diseases, continues IV Rocephin, remains afebrile.   Hypertension with adequate control of blood pressure.   Suspected prostate cancer with elevated PSA and metastatic lesions identified in hospital to vertebral body and rib, no pain involving these areas.    Persistent right lower extremity weakness, no new neurologic symptoms.   Medications and issues of concern reviewed.    Electronically signed by: Carmen Hein MD

## 2021-06-19 NOTE — LETTER
Letter by Heri Brewster CNP at      Author: Heri Brewster CNP Service: -- Author Type: --    Filed:  Encounter Date: 10/7/2019 Status: Signed         Patient: Venkatesh Alvarado   MR Number: [id number]   YOB: 1950   Date of Visit: 10/7/2019     [facility]    Facility:   [facility] [[id number]]   Code Status: FULL CODE      CHIEF COMPLAINT/REASON FOR VISIT:  Chief Complaint   Patient presents with   ? Review Of Multiple Medical Conditions       HISTORY:      HPI: Venkatesh is a 68 y.o. male who presented to [facility] ED with right shoulder pain, low back and was found to have R AC septic arthritis.   Remains on IV rocephin until 10/12. Labs weekly per ID.     While hospitalized he was found to have PSA 53 and T11 vertebral body and posterior left second rib metastasis. -to follow up with oncology after antibiotics are complete. On medrol dosepak taper.      New diagnosis of hypertension and is on amlodipine.     Today: Has had concerns with constipation. Would like something scheduled to help keep him regular. Also would like to d/c opiates as his pain has subsided quite a bit and is tolerable. Otherwise he is pleasant, attending therapies, appetite is good. .     Past Medical History:   Diagnosis Date   ? Osteoarthritis              Family History   Problem Relation Age of Onset   ? Cancer Mother    ? CABG Father    ? Stroke Father      Social History     Socioeconomic History   ? Marital status: Single     Spouse name: Not on file   ? Number of children: Not on file   ? Years of education: Not on file   ? Highest education level: Not on file   Occupational History   ? Not on file   Social Needs   ? Financial resource strain: Not on file   ? Food insecurity:     Worry: Not on file     Inability: Not on file   ? Transportation needs:     Medical: Not on file     Non-medical: Not on file   Tobacco Use   ? Smoking status: Former Smoker   ? Smokeless tobacco:  Never Used   Substance and Sexual Activity   ? Alcohol use: Yes     Alcohol/week: 1.0 - 3.0 standard drinks     Types: 1 - 3 Cans of beer per week     Comment: 3 times a week.   ? Drug use: No   ? Sexual activity: Not Currently   Lifestyle   ? Physical activity:     Days per week: Not on file     Minutes per session: Not on file   ? Stress: Not on file   Relationships   ? Social connections:     Talks on phone: Not on file     Gets together: Not on file     Attends Hoahaoism service: Not on file     Active member of club or organization: Not on file     Attends meetings of clubs or organizations: Not on file     Relationship status: Not on file   ? Intimate partner violence:     Fear of current or ex partner: Not on file     Emotionally abused: Not on file     Physically abused: Not on file     Forced sexual activity: Not on file   Other Topics Concern   ? Not on file   Social History Narrative   ? Not on file         Review of Systems   Constitutional: Negative for activity change.   HENT: Negative.    Respiratory: Negative.    Cardiovascular: Negative.    Gastrointestinal: Positive for constipation.   Genitourinary: Negative.    Musculoskeletal: Positive for arthralgias and back pain.   Neurological: Negative.        Vitals:    10/09/19 2211   BP: 122/77   Pulse: 86   Temp: 97  F (36.1  C)   SpO2: 96%   Weight: 188 lb (85.3 kg)       Physical Exam  Constitutional:       Appearance: Normal appearance. He is not ill-appearing.   Eyes:      Extraocular Movements: Extraocular movements intact.   Cardiovascular:      Rate and Rhythm: Normal rate and regular rhythm.      Pulses: Normal pulses.      Heart sounds: No murmur.   Pulmonary:      Effort: Pulmonary effort is normal.      Breath sounds: Normal breath sounds. No wheezing.   Abdominal:      General: Abdomen is flat.      Palpations: Abdomen is soft.   Musculoskeletal:         General: Tenderness present.   Skin:     General: Skin is warm and dry.   Neurological:       General: No focal deficit present.      Mental Status: He is alert.           LABS:   Reviewed.     ASSESSMENT:      ICD-10-CM    1. Septic arthritis of AC joint (H) M00.9    2. Right leg weakness R29.898    3. Prostate cancer metastatic to bone (H) C61     C79.51    4. Essential hypertension I10        PLAN:    -Continue amlodipine for now (did question if he needs it long term); discuss with PCP when discharged from TCU.   -D/c oxycodone.   -Start senna S 2 tabs po q day.   -Continue to drink adequate fluids, consume high fiber diet, and ambulate as much as possible to relieve constipation.       Electronically signed by: Heri Brewster, CNP

## 2021-06-19 NOTE — LETTER
Letter by Homa Ocasio MD at      Author: Homa Ocasio MD Service: -- Author Type: --    Filed:  Encounter Date: 9/25/2019 Status: Signed         Siva Rios MD  [address] [place] [address]                                  September 25, 2019    Patient: Venkatesh Alvarado   MR Number: [id number]   YOB: 1950   Date of Visit: 9/25/2019     Dear Dr. Blanca MD:    Thank you for referring Venkatesh Alvarado to me for evaluation. Below are the relevant portions of my assessment and plan of care.    If you have questions, please do not hesitate to call me. I look forward to following Vnekatesh along with you.    Sincerely,        Homa Ocasio MD          CC  No Recipients  Homa Ocasio MD  9/25/2019  2:57 PM  Sign when Signing Visit  [facility] Infectious Disease  Followup Visit        Assessment:   Group B strep bacteremia 9/1, negative BC 9/3  CRP peak  23  AC joint septic arthritis (GH joint OK), aspirated 9/1, positive culture, much better with abx  Status post I&D 9/2, JUWAN Etienne  MRI possible early SIJ septic srthritis, also improved pain  BPH , elevated PSA,likely cancer, never had biopsy, urology following  no discitis on MRI  CT chest abdomen pelvis no other focus    Plan:   IV ceftriaxone x 6 weeks, ending 10/12, and DC picc then  Lets get a followup MRI pelvis  Wrote orders on the TCU form  Otherwise follow-up as needed    Homa Ocasio M.D.          Present Illness:   This is a 68 years old male presenting for hospital follow-up.  He has been on IV ceftriaxone for group B strep bacteremia complicating septic right AC joint and possible early septic arthritis SI joint (seen on MRI but not bone scan) on the right side.  He has been receiving his IV of IV antibiotic at the TCU without any complications.  No fever chills rash itching diarrhea nausea vomiting.  Pain both in the AC joint and the SI joint have improved significantly.  He gets around with a cane.  He  does have follow-up with urology for suspicion of prostate cancer.    Review of Systems  Performed and all negative except as mentioned above.    Past medical, family, and social history reviewed, unchanged from before.        Physical Exam:     General Appearance:  Alert, cooperative, no distress, appears stated age    Head:  Normocephalic, without obvious abnormality, atraumatic   Neck no stiffness or adenopathy  Eyes no conjunctivitis or icterus     Cv No edema    shoulder  right shoulder incision healed.  No tenderness.  At the AC joint   hip  right side no direct tenderness       Extremities:  No synovitis    Skin:  Skin color, texture, turgor normal, no rashes or lesions    Neurologic:  Alert and oriented X 3, Moves all 4 extremities        DATA     Results for orders placed or performed in visit on 09/24/19   Comprehensive Metabolic Panel   Result Value Ref Range    Sodium 139 136 - 145 mmol/L    Potassium 4.2 3.5 - 5.0 mmol/L    Chloride 102 98 - 107 mmol/L    CO2 29 22 - 31 mmol/L    Anion Gap, Calculation 8 5 - 18 mmol/L    Glucose 89 70 - 125 mg/dL    BUN 18 8 - 22 mg/dL    Creatinine 1.05 0.70 - 1.30 mg/dL    GFR MDRD Af Amer >60 >60 mL/min/1.73m2    GFR MDRD Non Af Amer >60 >60 mL/min/1.73m2    Bilirubin, Total 0.4 0.0 - 1.0 mg/dL    Calcium 10.1 8.5 - 10.5 mg/dL    Protein, Total 7.8 6.0 - 8.0 g/dL    Albumin 3.1 (L) 3.5 - 5.0 g/dL    Alkaline Phosphatase 101 45 - 120 U/L    AST 24 0 - 40 U/L    ALT 19 0 - 45 U/L   C-Reactive Protein   Result Value Ref Range    CRP 2.3 (H) 0.0 - 0.8 mg/dL   HM1 (CBC with Diff)   Result Value Ref Range    WBC 6.6 4.0 - 11.0 thou/uL    RBC 4.28 (L) 4.40 - 6.20 mill/uL    Hemoglobin 13.0 (L) 14.0 - 18.0 g/dL    Hematocrit 40.4 40.0 - 54.0 %    MCV 94 80 - 100 fL    MCH 30.4 27.0 - 34.0 pg    MCHC 32.2 32.0 - 36.0 g/dL    RDW 12.1 11.0 - 14.5 %    Platelets 224 140 - 440 thou/uL    MPV 11.4 8.5 - 12.5 fL    Neutrophils % 72 (H) 50 - 70 %    Lymphocytes % 13 (L) 20 - 40 %     Monocytes % 11 (H) 2 - 10 %    Eosinophils % 3 0 - 6 %    Basophils % 1 0 - 2 %    Neutrophils Absolute 4.7 2.0 - 7.7 thou/uL    Lymphocytes Absolute 0.9 0.8 - 4.4 thou/uL    Monocytes Absolute 0.8 0.0 - 0.9 thou/uL    Eosinophils Absolute 0.2 0.0 - 0.4 thou/uL    Basophils Absolute 0.0 0.0 - 0.2 thou/uL         Homa Ocasio MD

## 2021-06-19 NOTE — LETTER
Letter by Carmen Hein MD at      Author: Carmen Hein MD Service: -- Author Type: --    Filed:  Encounter Date: 9/17/2019 Status: Signed         Patient: Venkatesh Alvarado   MR Number: [id number]   YOB: 1950   Date of Visit: 9/17/2019     [facility]    Facility:   [facility] [[id number]]   Code Status: POLST AVAILABLE    Reassessment of 68-year-old male who presented to hospital with escalating low back pain and right shoulder pain, diagnosed with be group B strep bacteremia and infection right AC and SI joint, additionally noted to have metastatic lesions T 11 and left seventh rib, PSA elevated, presumed metastatic prostate cancer, preceding history of hypertension, stabilized, transferred to TCU for six weeks course of IV Rocephin and for rehabilitation. Continues oxycodone and Tylenol, recent initiation of Voltaren gel.    Review of systems: mild decrease in right SI joint pain, mild remaining right AC joint pain. Continued generalized fatigue. Weakness right leg, difficulty ambulating secondary to pain. Participating fully in physical therapy. Denies sweats or chills. No cardiac or pulmonary symptoms. Mood stable. Remainder of 12 point review of systems obtained negative.    Exam: patient is seen with son present. Alert and oriented, blood pressure 107/65, temperature 97.6, pulse 81, respiratory 17, 02 95% on room air. No facial asymmetry or pharyngeal erythema. No carotid bruits or HJR. No cervical adenopathy. Right shoulder incision site with wound edges well approximated, trace warmth, no erythema or drainage. S1 and S2 regular. Pulmonary exam without rales rhonchi or wheezes. Abdomen without masses tenderness organomegaly. Periphery without edema. -1 strength right leg. Pedal pulses minus one and symmetrical.    Impression and plan:   septic arthritis right AC and right SI joint, continues IV Rocephin, pain decreasing,afebrile, followed by  infectious diseases.   Presumed metastatic prostate cancer with elevated PSA and metastatic lesions T 11 and left seventh rib, lesions asymptomatic.   Hypertension on Norvasc with low normal blood pressure recordings.   Right leg weakness without change, nothing to suggest cord involvement.   Concerns of patient and son in attendance reviewed.      Electronically signed by: Carmen Hein MD

## 2021-06-19 NOTE — LETTER
Letter by Carmen Hein MD at      Author: Carmen Hein MD Service: -- Author Type: --    Filed:  Encounter Date: 10/1/2019 Status: Signed         Patient: Venkatesh Alvarado   MR Number: [id number]   YOB: 1950   Date of Visit: 10/1/2019     [facility]    Facility:   [facility] [[id number]]   Code Status: POLST AVAILABLE    Reassessment of 68-year-old male who presented to hospital septic right AC and SI joint, group B strep septicemia, continues in TCU  on IV Rocephin via PICC line to be continued through 10/12. History of hypertension, elevated PSA in hospital, metastatic lesions thought to be secondary to prostate cancer which has not been evaluated by urology.    Review of systems: continued pain right SI joint, has been seen by infectious diseases with plan to continue IV antibiotic through 10/12. Denies fever sweats or chills. Persistent weakness right lower extremity. No cardiac or pulmonary symptoms. Continues to fully participate in physical therapy. No new joint symptomatology. Remainder of 12 point review of systems obtained negative.    Exam: blood pressure 109/65, temperature 98.6, pulse 77, respiratory 16, 02 96% on room air. Alert and oriented, assumes sitting from supine position with evidence of pain. No pharyngeal erythema. No carotid bruits. S1 and S2 regular. Pulmonary exam without rales rhonchi or wheezes. Abdomen without masses tenderness organomegaly. Surgical site from incision and drainage right AC joint with wound edges well approximated, trace erythema and warmth, no drainage. Trace warmth overlying right SI joint, minimal focal tenderness. Strength right lower extremity -2. Pedal pulses symmetrical. No calf tenderness or swelling.    Impression and plan:  recent group B strep septicemia and septic SI AC joints, continues afebrile, continues Rocephin through 10/12.   Persistent right lower extremity weakness, no focal neurologic  deficits of new onset, continues physical therapy.   Hypertension with adequate control of blood pressure   Metastatic vertebral and rib lesions, no pain associated, elevated PSA in hospital, will require urology follow-up post completion of IV antibiotic.      Electronically signed by: Carmen Hein MD

## 2021-06-19 NOTE — LETTER
Letter by Carmen Hein MD at      Author: Carmen Hein MD Service: -- Author Type: --    Filed:  Encounter Date: 9/26/2019 Status: Signed         Patient: Venkatesh Alvarado   MR Number: [id number]   YOB: 1950   Date of Visit: 9/26/2019     [facility]    Facility:   [facility] [[id number]]   Code Status: POLST AVAILABLE    68-year-old male TCU patient is seen for review. Admitted to hospital with progressive right shoulder and right lower back pain, diagnosed with group B strep bacteremia, infected right AC and SI joint, metastatic lesions identified T 11 and left seventh rib, PSA elevated at 53, metastatic lesions presumed prostatic in origin, stabilized and transferred to TCU for rehabilitation and management of medical problems which include hypertension, continues IV Rocephin.    Review of systems: evaluated by infectious diseases, will have repeat MRI of SI joint. Continued pain right SI joint, continued weakness right lower extremity. To continue IV antibiotic through 10/12. Denies thoracic or rib discomfort. No fever sweats or chills. No chest pain or palpitations. On one day awakened without pain, otherwise with persistent pain right lower back mildly decreased in intensity. Mood adequate, no cardiac or pulmonary symptoms. Remainder of 12 point review of systems obtained negative.    Exam: alert and oriented, pleasant and cooperative, sitting on bed edge in no apparent distress. Temperature 97.9, 02 97%, blood pressure 120/75, pulse 68 and regular. No facial asymmetry, no cervical adenopathy. S1 and S2 regular. Pulmonary exam without rales rhonchi or wheezes. Abdomen without masses tenderness organomegaly. Trace nonpitting edema lower extremities, trace venous stasis changes. -1 strength right lower extremity. No calf tenderness or swelling. Minimal warmth right EC joint, feeding erythema, no drainage, recent surgical incision site with wound  edges well approximated. Focal tenderness right SI joint, no warmth of overlying tissue, no mass.    Impression and plan:   metastatic lesions left seventh rib and T 11, nothing to suggest cord irritation, elevated PSA, will see urology in future regarding definitive treatment of presumed metastatic prostate cancer.   Group B strept bacteremia with septic joint right AC and right SI, continues IV Rocephin, remains afebrile, recent evaluation by infectious diseases, repeat MRI of SI joint pending.   Hypertension on Norvasc with satisfactory control of blood pressure.   Continued pain right SI joint with weakness right lower extremity, patient attempting to minimize oxycodone use.      Electronically signed by: Carmen Hein MD

## 2021-06-19 NOTE — LETTER
Letter by Carmen Hein MD at      Author: Carmen Hein MD Service: -- Author Type: --    Filed:  Encounter Date: 9/12/2019 Status: (Other)         Patient: Venkatesh Alvarado   MR Number: [id number]   YOB: 1950   Date of Visit: 9/12/2019     [facility]    Facility:   [facility] [[id number]]   Code Status: POLST AVAILABLE     Reassessment of 68-year-old male who presented to hospital with right shoulder and low back pain, diagnosed with group B strep bacteremia, right shoulder culture positive for group B strep, on six week course of IV Rocephin. Additional diagnosis of metastatic lesions to T 11 and left rib, PSA elevated, presumed metastatic prostate cancer. Stabilized in hospital and transferred to TCU for rehabilitation and management of medical problems.    Review of systems: intractable low back pain this morning, improved at present. Aching discomfort localized to right SI joint, no radiation of pain. Right shoulder pain tolerable. Generalized fatigue. Difficulty participating in physical therapy due to degree of pain. Denies fever sweats or chills. No cardiac or pulmonary symptoms. No focal neurologic deficits. Appetite adequate. Remainder of 12 point review of systems obtained negative.    Exam: alert and oriented, conversant, sitting in chair, appears moderately uncomfortable. No HJR. S1 and S2 regular. Pulmonary exam without adventitious sounds. Abdomen without masses tenderness organomegaly. Periphery without edema. Focal tenderness right SI joint, right shoulder incision without erythema or warmth.    Impression and plan:   septic arthritis rt AC joint and right SI joint, continues Rocephin, continues afebrile, increasing pain, trial of Voltaren gel 1% 2 g TID to right buttock, further increase in oxycodone dose as necessary for pain control.   Metastatic prostate cancer, concerns regarding diagnosis reviewed, definitive treatment will be  delayed until septic arthritis managed.   Hypertension on Norvasc with satisfactory control of blood pressure.   Multiple concerns are reviewed, discussion with nursing staff and patient.      Electronically signed by: Carmen Hein MD

## 2021-06-19 NOTE — LETTER
Letter by Carmen Hein MD at      Author: Carmen Hein MD Service: -- Author Type: --    Filed:  Encounter Date: 9/19/2019 Status: Signed         Patient: Venkatesh Alvarado   MR Number: [id number]   YOB: 1950   Date of Visit: 9/19/2019     [facility]    Facility:   [facility] [[id number]]   Code Status: POLST AVAILABLE    Reevaluation of 68-year-old male admitted to hospital with septic arthritis of right shoulder and right SI joint, had experienced progressive pain of areas for several weeks prior to presentation, additionally found to have metastatic lesions thoracic vertebral and rib, asymptomatic metastatic lesions suspected to be prostatitic with elevated PSA. Transferred to TCU for IV Rocephin administration, tapering course of steroid, for rehabilitation and pain management.    Review of systems: significant decrease in pain noted today. Taking oxycodone rarely. Alternates Voltaren gel and ice to area, requests ice pack and Voltaren gel be changed to PRN. Continued weakness right leg. Increased loose stools, currently on MiraLAX b.i.d. and Colace, requests discontinuation of MiraLAX. No abdominal pain. No blood or mucus in stool. No focal neurologic deficits of new onset. Persistent weakness right lower extremity. Ability to perform in physical therapy improved with recent decrease in pain. Remainder of 12 point review of systems obtained negative.    Exam: alert and oriented, sitting in chair in no apparent distress. No HJR or cervical adenopathy. S1 and S2 regular. Pulmonary exam without adventitious sounds. Incision site right shoulder with staples removed, trace warmth, no erythema or drainage. Focal tenderness right as site joint with trace warmth, no soft-tissue swelling. Abdomen without masses or tenderness. Peripheral pulsations symmetrical, no calf tenderness or swelling. Strength right lower extremity -1.    Impression and plan:   septic  arthritis with group B strep positive culture on IV Rocephin four total of six weeks. Significant pain gradually decreasing, change Voltaren and ice pack to PRN, continue Tylenol, oxycodone in use PRN.   Persistent right leg weakness, continue to observe, no progression.   Previous constipation resolved, now with loose stools, discontinue MiraLAX, continue Colace.   Hypertension on Norvasc with satisfactory control of blood pressure.   Vertebral and rib metastatic lesions, presumed to be prostatic in origin, asymptomatic, will evaluate with urology post completion of IV Rocephin for septic arthritis.      Electronically signed by: Carmen Hein MD

## 2021-06-19 NOTE — LETTER
Letter by Carmen Hein MD at      Author: Carmen Hein MD Service: -- Author Type: --    Filed:  Encounter Date: 10/8/2019 Status: Signed         Patient: Venkatesh Alvarado   MR Number: [id number]   YOB: 1950   Date of Visit: 10/8/2019     [facility]    Facility:   [facility] [[id number]]   Code Status: POLST AVAILABLE  Reassessment of 68-year-old male with recent hospitalization for septic right AC and SI joint, diagnosed with probable metastatic carcinoma prostate carcinoma during hospitalization, bone mets identified rib and vertebrae asymptomatic, PSA elevated, continues IV antibiotic through 10/12. Oxycodone rarely in use recently discontinued.    Review of systems: denies fever sweats or chills. Continued right leg weakness, slowly improving. Ambulates with a walker, can ambulate independently with difficulty. No urinary symptoms. Continued SI joint pain. No AC joint pain. Generalized fatigue remains present. Remainder 12 point review of systems obtained negative.    Exam: alert and oriented, in no apparent distress. Observed ambulating, weakness right leg, slow gait. PICC line without inflammation. S1 and S2 regular. No cervical adenopathy. Pulmonary exam without rales rhonchi or wheezes. Abdomen without masses or tenderness. Modest SI joint tenderness, warmth right SI joint. Periphery without edema.    Impression and plan:   AC joint and SI joint infection, status post I&D right AC joint, nearing completion of IV antibiotic, remains afebrile.   Weakness right leg, mild improvement.   Probable prostatic metastatic cancer with elevated PSA and bone mets asymptomatic.   Hypertension with adequate control of blood pressure. Upcoming urology appointment.        Electronically signed by: Carmen Hein MD

## 2021-06-19 NOTE — LETTER
Letter by Carmen Hein MD at      Author: Carmen Hein MD Service: -- Author Type: --    Filed:  Encounter Date: 9/10/2019 Status: (Other)         Patient: Venkatesh Alvarado   MR Number: [id number]   YOB: 1950   Date of Visit: 9/10/2019     [facility]    Facility:   [facility] [[id number]]   Code Status: POLST AVAILABLE    Admission evaluation to TCU of 68-year-old male. History is taken from patient who gives an adequate history and from accompanying hospital notes. Long-standing excellent health, highly active,retired, history of hypertension on Norvasc, presented to hospital with complaints of escalating right shoulder and low back pain, found to have septic arthritis of SI joint and right AC joint, culture results right shoulder group B strep with bacteremia present, metastatic lesions T 11 and left seventh rib identified, PSA elevated at 53, presumed metastatic prostate cancer, on IV Rocephin with recommendation of six-week course through 10/12, oxycodone for pain management, on amlodipine for hypertensive control, stabilized in hospital and transferred to TCU for rehabilitation, pain management, management of medical problems.    Past medical history, current medical problem list, drug allergies, current medication list, social history, family history, code status reviewed in epic.    Review of systems: significant right shoulder pain, increasing right lower back discomfort with radiation into right buttock. Weakness right leg. Highly troubled with potential diagnosis of prostate cancer, concerns regarding management and potential outcome. Tolerating oxycodone. Denies confusion. No chest pain or palpitations. No new focal neurologic deficits. No active G.I. or  symptoms, constipation adequately controlled. Remainder of 12 point review of systems obtained negative.    Exam: vital signs reviewed since admission to TCU. Alert and oriented, sitting  in chair, highly concerned, conversant. No pharyngeal erythema. No cervical adenopathy. Mucous membranes moist. Thyroid midline regular without nodularity or tenderness. S1 and S2 regular without murmur rub or extra sounds. Pulmonary exam without rales rhonchi or wheezes. Abdomen without hepatosplenomegaly masses or tenderness. Periphery without edema. Modest DJD changes knees and digital joints, no acute inflammatory change. Surgical incision site right superior shoulder with wound edges well approximated, no warmth or drainage. Nonspecific tenderness right SI joint. PICC line site without inflammation. No calf tenderness or swelling.    Sodium 138, potassium 4.0, cl 103, creatinine 1.03, CRP 2.0, hemoglobin 13.2, white count 10.7, platelet 218.    Impression and plan:   septic arthritis right AC joint and right SI joint, remains afebrile, on Rocephin through 10/6, significant pain right SI joint, continue oxycodone, additionally on Medrol taper.   Extensive DJD changes LS spine, no evidence of cord compression, weakness right leg reported during hospitalization, not confirmed on today's exam.   New diagnosis of probable prostate cancer, elevated PSA, metastatic suspicion T 11 and left seventh rib, concerns regarding diagnosis reviewed in detail, potential future management and long-term prognosis discussed.   Hypertension on amlodipine with satisfactory control of blood pressure   Recent encephalopathy resolved.   Total time of admission evaluation greater than 60 minutes, greater than 50% of time spent in coordination of care and counseling, multiple concerns reviewed at length.      Electronically signed by: Carmen Hein MD

## 2021-06-25 NOTE — TELEPHONE ENCOUNTER
I called pt.  Increase to 2 of the 2.5 mg amlodipine.  Update me.  OK to use tylenol for back pain, and hot or cold.  dlh

## 2021-06-25 NOTE — TELEPHONE ENCOUNTER
Test Results  Who is calling?:  Patient  Who ordered the test:  Siva Rios MD   Type of test: Lab  Date of test:  3/18/19  Where was the test performed:  [facility]  What are your questions/concerns?:  Patient stated he would like to know his lab results.  Okay to leave a detailed message?:  Yes

## 2021-06-27 ENCOUNTER — HEALTH MAINTENANCE LETTER (OUTPATIENT)
Age: 71
End: 2021-06-27

## 2021-07-03 NOTE — ADDENDUM NOTE
Addendum Note by Joni Lou MD at 9/2/2019  9:12 PM     Author: Joni Lou MD Service: -- Author Type: Physician    Filed: 9/2/2019  9:12 PM Date of Service: 9/2/2019  9:12 PM Status: Signed    : Joni Lou MD (Physician)       Addendum  created 09/02/19 2112 by Joni Lou MD    Sign clinical note

## 2021-07-03 NOTE — ANESTHESIA PREPROCEDURE EVALUATION
Anesthesia Preprocedure Evaluation by Kimberly Collazo MD at 9/2/2019  2:58 PM     Author: Kimberly Collazo MD Service: -- Author Type: Physician    Filed: 9/2/2019  6:26 PM Date of Service: 9/2/2019  2:58 PM Status: Addendum    : Kimberly Collazo MD (Physician)    Related Notes: Original Note by Kimberly Collazo MD (Physician) filed at 9/2/2019  6:16 PM       Anesthesia Evaluation      Patient summary reviewed   No history of anesthetic complications     Airway   Mallampati: III  Neck ROM: full   Pulmonary - normal exam   (+) sleep apnea (Undiagnosed.  Witnessed MARLENI) on no CPAP, , a smoker (Former)                         Cardiovascular - normal exam  Exercise tolerance: > or = 4 METS  ECG reviewed        Neuro/Psych      Comments:    2.1 x 1.8 cm marrow replacing, enhancing lesion in the posterior T11 vertebral body by lumbar MRI 9/1/19    Endo/Other    (+) arthritis, pregnant     Comments: Septic right shoulder joint  Bacteremia    GI/Hepatic/Renal    (+) GERD (Diet dependent) intermittent,        Other findings: 9/2/19 Echo  Summary       No echocardiographic evidence of endocarditis.    Normal left ventricular size and systolic function. The calculated left ventricular ejection fraction is 60%.    Normal right ventricular size and systolic function.    No hemodynamically significant valvular heart abnormalities.    No previous study for comparison.          Dental    (+) caps and chipped                           Anesthesia Plan  Planned anesthetic: general endotracheal  Prn Glidescope    No PNB for post operative pain because of the proximity of infected shoulder to block site.    Consider acetaminophen and toradol for post operative pain control       ASA 2   Induction: intravenous   Anesthetic plan and risks discussed with: patient, spouse and child/children  Anesthesia plan special considerations: antiemetics,   Post-op plan: routine recovery

## 2021-07-07 NOTE — TELEPHONE ENCOUNTER
Pt called to give update for PCP that med is currently working since the increase of dosage. BP seem to have dropped and no more headaches. He has 19 day supply left of amlodipine. His BP is averaging around 140/85.

## 2021-07-08 ENCOUNTER — RECORDS - HEALTHEAST (OUTPATIENT)
Dept: ADMINISTRATIVE | Facility: OTHER | Age: 71
End: 2021-07-08

## 2021-08-03 PROBLEM — M00.9: Status: RESOLVED | Noted: 2019-09-01 | Resolved: 2020-03-15

## 2021-08-03 PROBLEM — M46.58: Status: RESOLVED | Noted: 2019-09-01 | Resolved: 2020-03-15

## 2021-09-08 DIAGNOSIS — I10 ESSENTIAL (PRIMARY) HYPERTENSION: ICD-10-CM

## 2021-09-08 RX ORDER — AMLODIPINE BESYLATE 2.5 MG/1
TABLET ORAL
Qty: 90 TABLET | Refills: 1 | Status: SHIPPED | OUTPATIENT
Start: 2021-09-08 | End: 2021-09-29

## 2021-09-09 ENCOUNTER — TRANSFERRED RECORDS (OUTPATIENT)
Dept: HEALTH INFORMATION MANAGEMENT | Facility: CLINIC | Age: 71
End: 2021-09-09

## 2021-09-27 ENCOUNTER — HOSPITAL ENCOUNTER (OUTPATIENT)
Dept: NUCLEAR MEDICINE | Facility: HOSPITAL | Age: 71
End: 2021-09-27
Attending: UROLOGY
Payer: MEDICARE

## 2021-09-27 ENCOUNTER — HOSPITAL ENCOUNTER (OUTPATIENT)
Dept: CT IMAGING | Facility: HOSPITAL | Age: 71
End: 2021-09-27
Attending: UROLOGY
Payer: MEDICARE

## 2021-09-27 DIAGNOSIS — C61 MALIGNANT NEOPLASM OF PROSTATE (H): ICD-10-CM

## 2021-09-27 LAB
CREAT BLD-MCNC: 0.9 MG/DL (ref 0.7–1.3)
GFR SERPL CREATININE-BSD FRML MDRD: >60 ML/MIN/1.73M2

## 2021-09-27 PROCEDURE — A9503 TC99M MEDRONATE: HCPCS | Performed by: UROLOGY

## 2021-09-27 PROCEDURE — 343N000001 HC RX 343: Performed by: UROLOGY

## 2021-09-27 PROCEDURE — 74177 CT ABD & PELVIS W/CONTRAST: CPT

## 2021-09-27 PROCEDURE — 82565 ASSAY OF CREATININE: CPT

## 2021-09-27 PROCEDURE — 250N000011 HC RX IP 250 OP 636: Performed by: UROLOGY

## 2021-09-27 PROCEDURE — 78306 BONE IMAGING WHOLE BODY: CPT

## 2021-09-27 RX ORDER — IOPAMIDOL 755 MG/ML
100 INJECTION, SOLUTION INTRAVASCULAR ONCE
Status: COMPLETED | OUTPATIENT
Start: 2021-09-27 | End: 2021-09-27

## 2021-09-27 RX ORDER — TC 99M MEDRONATE 20 MG/10ML
20-30 INJECTION, POWDER, LYOPHILIZED, FOR SOLUTION INTRAVENOUS ONCE
Status: COMPLETED | OUTPATIENT
Start: 2021-09-27 | End: 2021-09-27

## 2021-09-27 RX ADMIN — IOPAMIDOL 100 ML: 755 INJECTION, SOLUTION INTRAVENOUS at 12:28

## 2021-09-27 RX ADMIN — TC 99M MEDRONATE 26.3 MCI.: 20 INJECTION, POWDER, LYOPHILIZED, FOR SOLUTION INTRAVENOUS at 11:58

## 2021-09-29 ENCOUNTER — NURSE TRIAGE (OUTPATIENT)
Dept: NURSING | Facility: CLINIC | Age: 71
End: 2021-09-29

## 2021-09-29 DIAGNOSIS — I10 ESSENTIAL HYPERTENSION: ICD-10-CM

## 2021-09-29 RX ORDER — AMLODIPINE BESYLATE 2.5 MG/1
5 TABLET ORAL DAILY
Qty: 180 TABLET | Refills: 2 | Status: SHIPPED | OUTPATIENT
Start: 2021-09-29 | End: 2022-06-07

## 2021-09-29 NOTE — TELEPHONE ENCOUNTER
High BP med refill .  Needs the 5mg per day refilled.    Went 2 weeks without as he ran out.    Needs filled urgently. Needs the TWO 2.5mg filled.    BP now 178/103  Taking cancer medication that raises his Bp, and prednisone.    No ha, chest pain.    Pratima Stearns RN  [facility] Nurse Advisor    Reason for Disposition    Systolic BP >= 180 OR Diastolic >= 110    Additional Information    Negative: Sounds like a life-threatening emergency to the triager    Negative: Pregnant > 20 weeks or postpartum (< 6 weeks after delivery) and new hand or face swelling    Negative: Pregnant > 20 weeks and BP > 140/90    Negative: Systolic BP >= 160 OR Diastolic >= 100, and any cardiac or neurologic symptoms (e.g., chest pain, difficulty breathing, unsteady gait, blurred vision)    Negative: Patient sounds very sick or weak to the triager    Negative: BP Systolic BP >= 140 OR Diastolic >= 90 and postpartum (from 0 to 6 weeks after delivery)    Negative: Systolic BP >= 180 OR Diastolic >= 110, and missed most recent dose of blood pressure medication    Protocols used: HIGH BLOOD PRESSURE-A-OH

## 2021-09-29 NOTE — TELEPHONE ENCOUNTER
"  Disp Refills Start End NORTH    amLODIPine (NORVASC) 2.5 MG tablet 90 tablet 1 6/16/2021  No   Sig - Route: Take 2 tablets (5 mg total) by mouth daily. - Oral   Class: No Print       amLODIPine (NORVASC) 2.5 MG tablet [[id number]]    Electronically signed by: Siva Rios MD on 06/16/21 1857 Status: Active   Ordering user: Siva Rios MD 06/16/21 1857 Authorized by: Siva Rios MD   Frequency: DAILY 06/16/21 - Until Discontinued   Diagnoses  Essential hypertension [I10]     CLASS:  No print.  Sending refill to pharmacy per protocol    Last Written Prescription Date:  5/11/21  Last Fill Quantity: 90,  # refills: 1   Last office visit provider:  4/27/21     Requested Prescriptions   Pending Prescriptions Disp Refills     amLODIPine (NORVASC) 2.5 MG tablet 90 tablet 0     Sig: Take 2 tablets (5 mg) by mouth daily       Calcium Channel Blockers Protocol  Failed - 9/29/2021  8:46 AM        Failed - Recent (12 mo) or future (30 days) visit within the authorizing provider's specialty     Patient has had an office visit with the authorizing provider or a provider within the authorizing providers department within the previous 12 mos or has a future within next 30 days. See \"Patient Info\" tab in inbasket, or \"Choose Columns\" in Meds & Orders section of the refill encounter.              Passed - Blood pressure under 140/90 in past 12 months     BP Readings from Last 3 Encounters:   04/27/21 136/78   03/10/20 120/70   10/30/19 102/70                 Passed - Medication is active on med list        Passed - Patient is age 18 or older        Passed - Normal serum creatinine on file in past 12 months     Recent Labs   Lab Test 09/27/21  1204   CR 0.9       Ok to refill medication if creatinine is low               Harsh Quintero RN 09/29/21 9:04 AM  "

## 2021-10-17 ENCOUNTER — HEALTH MAINTENANCE LETTER (OUTPATIENT)
Age: 71
End: 2021-10-17

## 2021-10-19 DIAGNOSIS — Z11.59 ENCOUNTER FOR SCREENING FOR OTHER VIRAL DISEASES: ICD-10-CM

## 2021-10-25 ENCOUNTER — LAB (OUTPATIENT)
Dept: FAMILY MEDICINE | Facility: CLINIC | Age: 71
End: 2021-10-25
Attending: UROLOGY
Payer: MEDICARE

## 2021-10-25 DIAGNOSIS — Z11.59 ENCOUNTER FOR SCREENING FOR OTHER VIRAL DISEASES: ICD-10-CM

## 2021-10-25 LAB — SARS-COV-2 RNA RESP QL NAA+PROBE: NEGATIVE

## 2021-10-25 PROCEDURE — U0005 INFEC AGEN DETEC AMPLI PROBE: HCPCS

## 2021-10-25 PROCEDURE — U0003 INFECTIOUS AGENT DETECTION BY NUCLEIC ACID (DNA OR RNA); SEVERE ACUTE RESPIRATORY SYNDROME CORONAVIRUS 2 (SARS-COV-2) (CORONAVIRUS DISEASE [COVID-19]), AMPLIFIED PROBE TECHNIQUE, MAKING USE OF HIGH THROUGHPUT TECHNOLOGIES AS DESCRIBED BY CMS-2020-01-R: HCPCS

## 2021-10-29 ENCOUNTER — HOSPITAL ENCOUNTER (OUTPATIENT)
Dept: INTERVENTIONAL RADIOLOGY/VASCULAR | Facility: HOSPITAL | Age: 71
Discharge: HOME | End: 2021-10-29
Attending: UROLOGY | Admitting: RADIOLOGY
Payer: MEDICARE

## 2021-10-29 VITALS
DIASTOLIC BLOOD PRESSURE: 84 MMHG | SYSTOLIC BLOOD PRESSURE: 158 MMHG | TEMPERATURE: 98 F | HEIGHT: 69 IN | WEIGHT: 210 LBS | RESPIRATION RATE: 12 BRPM | HEART RATE: 62 BPM | OXYGEN SATURATION: 97 % | BODY MASS INDEX: 31.1 KG/M2

## 2021-10-29 DIAGNOSIS — C61 MALIGNANT TUMOR OF PROSTATE (H): ICD-10-CM

## 2021-10-29 PROCEDURE — C1769 GUIDE WIRE: HCPCS

## 2021-10-29 PROCEDURE — 250N000009 HC RX 250: Performed by: RADIOLOGY

## 2021-10-29 PROCEDURE — 250N000011 HC RX IP 250 OP 636: Performed by: RADIOLOGY

## 2021-10-29 PROCEDURE — 250N000011 HC RX IP 250 OP 636: Performed by: PHYSICIAN ASSISTANT

## 2021-10-29 PROCEDURE — 76937 US GUIDE VASCULAR ACCESS: CPT

## 2021-10-29 PROCEDURE — 272N000500 HC NEEDLE CR2

## 2021-10-29 PROCEDURE — C1750 CATH, HEMODIALYSIS,LONG-TERM: HCPCS

## 2021-10-29 PROCEDURE — 258N000003 HC RX IP 258 OP 636: Performed by: PHYSICIAN ASSISTANT

## 2021-10-29 PROCEDURE — 99152 MOD SED SAME PHYS/QHP 5/>YRS: CPT

## 2021-10-29 PROCEDURE — 77001 FLUOROGUIDE FOR VEIN DEVICE: CPT

## 2021-10-29 PROCEDURE — 99153 MOD SED SAME PHYS/QHP EA: CPT

## 2021-10-29 RX ORDER — PREDNISONE 5 MG/1
5 TABLET ORAL DAILY
Status: ON HOLD | COMMUNITY
Start: 2021-09-15 | End: 2022-06-07

## 2021-10-29 RX ORDER — NALOXONE HYDROCHLORIDE 0.4 MG/ML
0.2 INJECTION, SOLUTION INTRAMUSCULAR; INTRAVENOUS; SUBCUTANEOUS
Status: CANCELLED | OUTPATIENT
Start: 2021-10-29

## 2021-10-29 RX ORDER — CEFAZOLIN SODIUM 2 G/100ML
2 INJECTION, SOLUTION INTRAVENOUS
Status: COMPLETED | OUTPATIENT
Start: 2021-10-29 | End: 2021-10-29

## 2021-10-29 RX ORDER — FLUMAZENIL 0.1 MG/ML
0.2 INJECTION, SOLUTION INTRAVENOUS
Status: CANCELLED | OUTPATIENT
Start: 2021-10-29

## 2021-10-29 RX ORDER — SODIUM CHLORIDE 9 MG/ML
INJECTION, SOLUTION INTRAVENOUS CONTINUOUS
Status: DISCONTINUED | OUTPATIENT
Start: 2021-10-29 | End: 2021-10-30 | Stop reason: HOSPADM

## 2021-10-29 RX ORDER — NALOXONE HYDROCHLORIDE 0.4 MG/ML
0.4 INJECTION, SOLUTION INTRAMUSCULAR; INTRAVENOUS; SUBCUTANEOUS
Status: CANCELLED | OUTPATIENT
Start: 2021-10-29

## 2021-10-29 RX ORDER — PROCHLORPERAZINE MALEATE 10 MG
10 TABLET ORAL DAILY
COMMUNITY
End: 2021-10-29

## 2021-10-29 RX ORDER — LIDOCAINE 40 MG/G
CREAM TOPICAL
Status: DISCONTINUED | OUTPATIENT
Start: 2021-10-29 | End: 2021-10-30 | Stop reason: HOSPADM

## 2021-10-29 RX ORDER — HEPARIN SODIUM 1000 [USP'U]/ML
4500 INJECTION, SOLUTION INTRAVENOUS; SUBCUTANEOUS ONCE
Status: COMPLETED | OUTPATIENT
Start: 2021-10-29 | End: 2021-10-29

## 2021-10-29 RX ORDER — ABIRATERONE ACETATE 250 MG/1
250 TABLET ORAL 4 TIMES DAILY
COMMUNITY
End: 2022-05-30

## 2021-10-29 RX ORDER — FENTANYL CITRATE 50 UG/ML
INJECTION, SOLUTION INTRAMUSCULAR; INTRAVENOUS PRN
Status: COMPLETED | OUTPATIENT
Start: 2021-10-29 | End: 2021-10-29

## 2021-10-29 RX ORDER — FENTANYL CITRATE 50 UG/ML
25-50 INJECTION, SOLUTION INTRAMUSCULAR; INTRAVENOUS EVERY 5 MIN PRN
Status: CANCELLED | OUTPATIENT
Start: 2021-10-29

## 2021-10-29 RX ADMIN — SODIUM CHLORIDE: 9 INJECTION, SOLUTION INTRAVENOUS at 12:51

## 2021-10-29 RX ADMIN — FENTANYL CITRATE 50 MCG: 50 INJECTION, SOLUTION INTRAMUSCULAR; INTRAVENOUS at 13:10

## 2021-10-29 RX ADMIN — LIDOCAINE HYDROCHLORIDE 10 ML: 10 INJECTION, SOLUTION EPIDURAL; INFILTRATION; INTRACAUDAL; PERINEURAL at 13:15

## 2021-10-29 RX ADMIN — FENTANYL CITRATE 50 MCG: 50 INJECTION, SOLUTION INTRAMUSCULAR; INTRAVENOUS at 12:55

## 2021-10-29 RX ADMIN — MIDAZOLAM HYDROCHLORIDE 1 MG: 1 INJECTION, SOLUTION INTRAMUSCULAR; INTRAVENOUS at 12:54

## 2021-10-29 RX ADMIN — MIDAZOLAM HYDROCHLORIDE 1 MG: 1 INJECTION, SOLUTION INTRAMUSCULAR; INTRAVENOUS at 13:01

## 2021-10-29 RX ADMIN — CEFAZOLIN SODIUM 2 G: 2 INJECTION, SOLUTION INTRAVENOUS at 12:01

## 2021-10-29 RX ADMIN — HEPARIN SODIUM 4500 UNITS: 1000 INJECTION, SOLUTION INTRAVENOUS; SUBCUTANEOUS at 13:18

## 2021-10-29 RX ADMIN — MIDAZOLAM HYDROCHLORIDE 0.5 MG: 1 INJECTION, SOLUTION INTRAMUSCULAR; INTRAVENOUS at 13:12

## 2021-10-29 ASSESSMENT — MIFFLIN-ST. JEOR: SCORE: 1702.93

## 2021-10-29 NOTE — H&P
"  Interventional Radiology - Pre-Procedure Note:  10/29/2021    Procedure Requested: tunneled dialysis/pheresis catheter placement  Requested by: Preet Shabazz MD    Brief HPI: Venkatesh Alvarado is a 71 year old male with prostate cancer with bone metastasis. Patient will be starting pheresis. Presenting for tunneled CVC placement.       NPO: midnight.  ANTICOAGULANTS: none  ANTIBIOTICS: ancef ordered for procedure.    ALLERGIES  No Known Allergies    Past Medical History:   Diagnosis Date     Cancer (H)     prostate     Osteoarthritis      Patient Active Problem List   Diagnosis     Prostate cancer metastatic to bone (H)     Essential hypertension     Primary osteoarthritis of right knee     Pituitary adenoma (H)       Past Surgical History:   Procedure Laterality Date     INGUINAL HERNIA REPAIR Right      PICC AND MIDLINE TEAM LINE INSERTION  9/5/2019            LABS:  INR   Date Value Ref Range Status   04/16/2021 1.09 0.90 - 1.10 Final      Hemoglobin   Date Value Ref Range Status   04/16/2021 14.2 14.0 - 18.0 g/dL Final   ]  Platelet Count   Date Value Ref Range Status   04/16/2021 158 140 - 440 thou/uL Final     Creatinine   Date Value Ref Range Status   04/16/2021 0.82 0.70 - 1.30 mg/dL Final     Creatinine POCT   Date Value Ref Range Status   09/27/2021 0.9 0.7 - 1.3 mg/dL Final     Potassium   Date Value Ref Range Status   04/16/2021 4.1 3.5 - 5.0 mmol/L Final         EXAM:  BP (!) 192/93 (BP Location: Left arm)   Pulse 59   Temp 98.4  F (36.9  C) (Oral)   Resp 22   Ht 1.753 m (5' 9\")   Wt 95.3 kg (210 lb)   SpO2 100%   BMI 31.01 kg/m    General:  Stable.  In no acute distress.    Neuro:  A&O x 3. Moves all extremities equally.  Resp:  Lungs clear to auscultation bilaterally.  Cardio:  S1S2 and reg, without murmur, clicks or rubs  Skin:  Without excoriations, ecchymosis, erythema, lesions or open sores on upper chest and neck.      Pre-Sedation Assessment:  Mallampati Airway Classification:  " II - Faucial pillars and soft palate may be seen, but uvula is masked by the base of the tongue  Previous reaction to anesthesia/sedation:  No  Sedation plan based on assessment: Moderate (conscious) sedation  ASA Classification: Class 3 - SEVERE SYSTEMIC DISEASE, DEFINITE FUNCTIONAL LIMITATIONS.   Code Status: Full Code    ASSESSMENT/PLAN:   Prostate cancer, with bone metastasis. Patient will be starting pheresis.    Tunneled HD catheter placement with sedation.    Procedure, risks/benefits, details, alternatives, and sedation reviewed with patient and patient verbalized understanding. All questions answered. OK to proceed with above radiology procedure.     LEATHA LawsonC  Interventional Radiology

## 2021-10-29 NOTE — DISCHARGE INSTRUCTIONS
Tunneled Central Catheter Discharge Instructions:  You had a tunneled central access device placed. Part of the device is outside of your body and part of the device runs under the skin into a vein. Your nurses and doctors will use the tunneled catheter for your future treatments.    Care Instructions:   - If you received sedation for your procedure, do not drive or operate heavy machinery for the rest of the day.  - Keep dialysis catheter site dry. Do not get wet.  - Never immerse your catheter in water; no swimming, hot tubs, or tub baths.  - If you experience significant bleeding at site, apply pressure with hands above the clavicle bone, sit upright.     Seek medical assistance for any of the following:   - Uncontrolled bleeding.  - You have a fever (greater than 101 F (38.3C)).  - Purulent (yellow/green/foul smelling) drainage from catheter insertion site.  - Increasing redness at catheter site.  - Increasing pain at catheter site.  - Increasing swelling at catheter site.

## 2021-10-29 NOTE — PRE-PROCEDURE
GENERAL PRE-PROCEDURE:   Procedure:  Tunneled HD catheter  Date/Time:  10/29/2021 12:04 PM    Written consent obtained?: Yes    Risks and benefits: Risks, benefits and alternatives were discussed    Consent given by:  Patient  Patient states understanding of procedure being performed: Yes    Patient's understanding of procedure matches consent: Yes    Procedure consent matches procedure scheduled: Yes    Expected level of sedation:  Moderate  Appropriately NPO:  Yes  ASA Class:  3  Mallampati  :  Grade 2- soft palate, base of uvula, tonsillar pillars, and portion of posterior pharyngeal wall visible  Lungs:  Lungs clear with good breath sounds bilaterally  Heart:  Normal heart sounds and rate  History & Physical reviewed:  History and physical reviewed and no updates needed  Statement of review:  I have reviewed the lab findings, diagnostic data, medications, and the plan for sedation

## 2021-10-29 NOTE — IP AVS SNAPSHOT
[facility] Interventional Radiology  [address] [place] [id number]  Phone: [phone]  Fax: [phone]                                    After Visit Summary   10/29/2021    Venkatesh Alvarado    MRN: [id number]           After Visit Summary Signature Page    I have received my discharge instructions, and my questions have been answered. I have discussed any challenges I see with this plan with the nurse or doctor.    ..........................................................................................................................................  Patient/Patient Representative Signature      ..........................................................................................................................................  Patient Representative Print Name and Relationship to Patient    ..................................................               ................................................  Date                                   Time    ..........................................................................................................................................  Reviewed by Signature/Title    ...................................................              ..............................................  Date                                               Time          22EPIC Rev 08/18

## 2021-11-04 ENCOUNTER — TRANSFERRED RECORDS (OUTPATIENT)
Dept: HEALTH INFORMATION MANAGEMENT | Facility: CLINIC | Age: 71
End: 2021-11-04
Payer: MEDICARE

## 2021-11-11 ENCOUNTER — TRANSFERRED RECORDS (OUTPATIENT)
Dept: HEALTH INFORMATION MANAGEMENT | Facility: CLINIC | Age: 71
End: 2021-11-11
Payer: MEDICARE

## 2021-11-12 ENCOUNTER — HOSPITAL ENCOUNTER (EMERGENCY)
Facility: HOSPITAL | Age: 71
Discharge: HOME | End: 2021-11-13
Attending: EMERGENCY MEDICINE | Admitting: EMERGENCY MEDICINE
Payer: MEDICARE

## 2021-11-12 DIAGNOSIS — R52 PAIN: ICD-10-CM

## 2021-11-12 DIAGNOSIS — E86.0 DEHYDRATION: ICD-10-CM

## 2021-11-12 DIAGNOSIS — R79.89 ELEVATED LFTS: ICD-10-CM

## 2021-11-12 PROCEDURE — 99285 EMERGENCY DEPT VISIT HI MDM: CPT | Mod: 25

## 2021-11-12 PROCEDURE — 96361 HYDRATE IV INFUSION ADD-ON: CPT

## 2021-11-12 PROCEDURE — 96375 TX/PRO/DX INJ NEW DRUG ADDON: CPT

## 2021-11-12 PROCEDURE — 96374 THER/PROPH/DIAG INJ IV PUSH: CPT

## 2021-11-12 PROCEDURE — 93005 ELECTROCARDIOGRAM TRACING: CPT | Performed by: EMERGENCY MEDICINE

## 2021-11-12 PROCEDURE — 258N000003 HC RX IP 258 OP 636: Performed by: EMERGENCY MEDICINE

## 2021-11-12 RX ORDER — SODIUM CHLORIDE 9 MG/ML
1000 INJECTION, SOLUTION INTRAVENOUS CONTINUOUS
Status: DISCONTINUED | OUTPATIENT
Start: 2021-11-12 | End: 2021-11-13 | Stop reason: HOSPADM

## 2021-11-12 RX ORDER — KETOROLAC TROMETHAMINE 15 MG/ML
15 INJECTION, SOLUTION INTRAMUSCULAR; INTRAVENOUS ONCE
Status: COMPLETED | OUTPATIENT
Start: 2021-11-12 | End: 2021-11-13

## 2021-11-12 RX ADMIN — SODIUM CHLORIDE 1000 ML: 9 INJECTION, SOLUTION INTRAVENOUS at 23:59

## 2021-11-12 ASSESSMENT — ENCOUNTER SYMPTOMS
ARTHRALGIAS: 1
HEMATURIA: 0
DIARRHEA: 0
DYSURIA: 0
FEVER: 1
FATIGUE: 1
NAUSEA: 0
ABDOMINAL PAIN: 1
VOMITING: 0
DIFFICULTY URINATING: 0
WEAKNESS: 1

## 2021-11-13 VITALS
WEIGHT: 210 LBS | DIASTOLIC BLOOD PRESSURE: 67 MMHG | RESPIRATION RATE: 20 BRPM | SYSTOLIC BLOOD PRESSURE: 119 MMHG | OXYGEN SATURATION: 98 % | HEART RATE: 65 BPM | BODY MASS INDEX: 31.01 KG/M2 | TEMPERATURE: 97 F

## 2021-11-13 LAB
ALBUMIN SERPL-MCNC: 3.3 G/DL (ref 3.5–5)
ALP SERPL-CCNC: 47 U/L (ref 45–120)
ALT SERPL W P-5'-P-CCNC: 50 U/L (ref 0–45)
ANION GAP SERPL CALCULATED.3IONS-SCNC: 10 MMOL/L (ref 5–18)
AST SERPL W P-5'-P-CCNC: 107 U/L (ref 0–40)
ATRIAL RATE - MUSE: 74 BPM
BASOPHILS # BLD AUTO: 0 10E3/UL (ref 0–0.2)
BASOPHILS NFR BLD AUTO: 0 %
BILIRUB SERPL-MCNC: 1.2 MG/DL (ref 0–1)
BUN SERPL-MCNC: 31 MG/DL (ref 8–28)
CALCIUM SERPL-MCNC: 8 MG/DL (ref 8.5–10.5)
CHLORIDE BLD-SCNC: 106 MMOL/L (ref 98–107)
CO2 SERPL-SCNC: 21 MMOL/L (ref 22–31)
CREAT SERPL-MCNC: 1.13 MG/DL (ref 0.7–1.3)
DIASTOLIC BLOOD PRESSURE - MUSE: NORMAL MMHG
EOSINOPHIL # BLD AUTO: 0 10E3/UL (ref 0–0.7)
EOSINOPHIL NFR BLD AUTO: 1 %
ERYTHROCYTE [DISTWIDTH] IN BLOOD BY AUTOMATED COUNT: 11.9 % (ref 10–15)
GFR SERPL CREATININE-BSD FRML MDRD: 65 ML/MIN/1.73M2
GLUCOSE BLD-MCNC: 108 MG/DL (ref 70–125)
HCT VFR BLD AUTO: 38.3 % (ref 40–53)
HGB BLD-MCNC: 13.2 G/DL (ref 13.3–17.7)
IMM GRANULOCYTES # BLD: 0 10E3/UL
IMM GRANULOCYTES NFR BLD: 1 %
INTERPRETATION ECG - MUSE: NORMAL
LYMPHOCYTES # BLD AUTO: 0.5 10E3/UL (ref 0.8–5.3)
LYMPHOCYTES NFR BLD AUTO: 8 %
MCH RBC QN AUTO: 32 PG (ref 26.5–33)
MCHC RBC AUTO-ENTMCNC: 34.5 G/DL (ref 31.5–36.5)
MCV RBC AUTO: 93 FL (ref 78–100)
MONOCYTES # BLD AUTO: 0.5 10E3/UL (ref 0–1.3)
MONOCYTES NFR BLD AUTO: 9 %
NEUTROPHILS # BLD AUTO: 5.2 10E3/UL (ref 1.6–8.3)
NEUTROPHILS NFR BLD AUTO: 81 %
NRBC # BLD AUTO: 0 10E3/UL
NRBC BLD AUTO-RTO: 0 /100
P AXIS - MUSE: 4 DEGREES
PLATELET # BLD AUTO: 119 10E3/UL (ref 150–450)
POTASSIUM BLD-SCNC: 3.6 MMOL/L (ref 3.5–5)
PR INTERVAL - MUSE: 164 MS
PROT SERPL-MCNC: 5.9 G/DL (ref 6–8)
QRS DURATION - MUSE: 86 MS
QT - MUSE: 358 MS
QTC - MUSE: 397 MS
R AXIS - MUSE: 2 DEGREES
RBC # BLD AUTO: 4.13 10E6/UL (ref 4.4–5.9)
SODIUM SERPL-SCNC: 137 MMOL/L (ref 136–145)
SYSTOLIC BLOOD PRESSURE - MUSE: NORMAL MMHG
T AXIS - MUSE: -40 DEGREES
VENTRICULAR RATE- MUSE: 74 BPM
WBC # BLD AUTO: 6.3 10E3/UL (ref 4–11)

## 2021-11-13 PROCEDURE — 250N000011 HC RX IP 250 OP 636: Performed by: EMERGENCY MEDICINE

## 2021-11-13 PROCEDURE — 258N000003 HC RX IP 258 OP 636: Performed by: EMERGENCY MEDICINE

## 2021-11-13 PROCEDURE — 36415 COLL VENOUS BLD VENIPUNCTURE: CPT | Performed by: EMERGENCY MEDICINE

## 2021-11-13 PROCEDURE — 85025 COMPLETE CBC W/AUTO DIFF WBC: CPT | Performed by: EMERGENCY MEDICINE

## 2021-11-13 PROCEDURE — 80053 COMPREHEN METABOLIC PANEL: CPT | Performed by: EMERGENCY MEDICINE

## 2021-11-13 RX ORDER — OXYCODONE HYDROCHLORIDE 5 MG/1
5 TABLET ORAL EVERY 8 HOURS PRN
Qty: 10 TABLET | Refills: 0 | Status: SHIPPED | OUTPATIENT
Start: 2021-11-13 | End: 2022-05-30

## 2021-11-13 RX ADMIN — SODIUM CHLORIDE 500 ML: 9 INJECTION, SOLUTION INTRAVENOUS at 00:00

## 2021-11-13 RX ADMIN — HYDROMORPHONE HYDROCHLORIDE 1 MG: 1 INJECTION, SOLUTION INTRAMUSCULAR; INTRAVENOUS; SUBCUTANEOUS at 00:06

## 2021-11-13 RX ADMIN — KETOROLAC TROMETHAMINE 15 MG: 15 INJECTION, SOLUTION INTRAMUSCULAR; INTRAVENOUS at 00:06

## 2021-11-13 NOTE — DISCHARGE INSTRUCTIONS
Please take ibuprofen 600 mg every 6 hours and alternate with Tylenol 500 mg every 6 hours.  Because your liver function tests are slightly elevated, please limit Tylenol use to 2000 mg daily.  Take with a small amount of food to avoid stomach upset.    Please follow-up with your primary doctor or Dr. Shabazz to recheck your liver function tests on Tuesday or Wednesday prior to your next chemotherapy treatment on Thursday.    If you develop fever greater than 100.4 degrees, vomiting and are unable to tolerate oral hydration or if you are unable to control your pain at home with over-the-counter medications plus your new prescription, please return for evaluation.   05-Jan-2021

## 2021-11-13 NOTE — ED PROVIDER NOTES
EMERGENCY DEPARTMENT ENCOUNTER      NAME: Venkatesh Alvarado  AGE: 71 year old male  YOB: 1950  MRN: [id number]  EVALUATION DATE & TIME: 2021 10:19 PM    PCP: Siva Rios    ED PROVIDER: Tena Curtis M.D.      Chief Complaint   Patient presents with     Musculoskeletal Problem         FINAL IMPRESSION:  1. Dehydration    2. Elevated LFTs    3. Pain        MEDICAL DECISION MAKIN:13 PM I met with the patient, obtained history, performed an initial exam, and discussed options and plan for diagnostics and treatment here in the ED. PPE: Provider wore gloves, goggles, N95 mask, and fabric mask.   Pertinent Labs & Imaging studies reviewed. (See chart for details)     Venkatesh Alvarado is a 71 year oldmale who presents with whole body pain, and weakness following his chemotherapy treatment.  This is his second treatment.  Was told that it would be worse than the first but he feels that it significantly worse than his first treatment.  All of his muscles and joints hurt, weakness and some upper abdominal pain.  His vital signs are actually normal.  He has no chest pain or shortness of breath.  No fever.  With generalized weakness, differential diagnosis includes electrolyte abnormality, dehydration, acute kidney injury, or anemia.  No infectious signs or symptoms.  I will give him a fluid bolus, pain medications and check basic labs.  ECG was obtained which does not show any signs of acute ischemia.      He has a very mild anemia with hemoglobin 13.2.  Electrolytes are okay.  His calcium is slightly low however so is his albumin and I suspect that if we checked an ionized calcium, this would be normal.  His LFTs are slightly elevated but alk phos is normal.  I suspect that this might be mild liver inflammation related to chemotherapy.  No other cause for liver injury is identifiable at this time.      After fluids and pain medications, he is feeling improved.  Given unremarkable exam,  normal vitals and very slight elevation in his LFTs, I do feel that this can be followed by his oncologist.  I would like him to get his LFTs rechecked on Tuesday prior to his next chemotherapy.    We discussed ways to manage his symptoms at home and I will provide him with a few tablets of oxycodone for severe bouts of pain.  We also discussed warning signs and indications to return to the emergency department.  He understands these warning signs and will return with any concerns.       MEDICATIONS GIVEN IN THE EMERGENCY:  Medications   sodium chloride 0.9% infusion (0 mLs Intravenous Stopped 11/13/21 0108)   0.9% sodium chloride BOLUS (0 mLs Intravenous Stopped 11/13/21 0107)   HYDROmorphone (DILAUDID) injection 1 mg (1 mg Intravenous Given 11/13/21 0006)   ketorolac (TORADOL) injection 15 mg (15 mg Intravenous Given 11/13/21 0006)       NEW PRESCRIPTIONS STARTED AT TODAY'S ER VISIT:  Discharge Medication List as of 11/13/2021  1:08 AM      START taking these medications    Details   oxyCODONE (ROXICODONE) 5 MG tablet Take 1 tablet (5 mg) by mouth every 8 hours as needed for severe pain, Disp-10 tablet, R-0, Local Print                =================================================================    HPI    Patient information was obtained from: patient and ex-wife    Use of : N/A         Venkatesh Alvarado is a 71 year old male with a 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 He has since developed pain, ex-wife called his doctor, and doctors believe that they should not have given the shot at the same time as his treatment, so they were referred here and told to check his calcium level. He states that he has no  "pain at baseline but has been in constant pain since the treatment, which is rated 8-9/10. Pain is \"everywhere,\" noting his abdomen, chest, joints, and shoulders. Has been taking tylenol at home without relief, as his providers have not given him anything stronger. Reports associated low grade fever and generalized weakness. Otherwise denies nausea, vomiting, diarrhea, difficulty urinating, dysuria, hematuria, or any other complaints at this time.      REVIEW OF SYSTEMS   Review of Systems   Constitutional: Positive for fatigue and fever.   Cardiovascular: Positive for chest pain.   Gastrointestinal: Positive for abdominal pain. Negative for diarrhea, nausea and vomiting.   Genitourinary: Negative for difficulty urinating, dysuria and hematuria.   Musculoskeletal: Positive for arthralgias.        Positive for generalized pain and shoulder pain.    Neurological: Positive for weakness (generalized).   All other systems reviewed and are negative.       PAST MEDICAL HISTORY:  Past Medical History:   Diagnosis Date     Cancer (H)     prostate     Osteoarthritis        PAST SURGICAL HISTORY:  Past Surgical History:   Procedure Laterality Date     INGUINAL HERNIA REPAIR Right      IR CVC TUNNEL PLACEMENT > 5 YRS OF AGE  10/29/2021     PICC AND MIDLINE TEAM LINE INSERTION  9/5/2019            CURRENT MEDICATIONS:      Current Facility-Administered Medications:      0.9% sodium chloride BOLUS, 500 mL, Intravenous, Once, Tena Curtis MD     HYDROmorphone (DILAUDID) injection 1 mg, 1 mg, Intravenous, Once, Tena Curtis MD     ketorolac (TORADOL) injection 15 mg, 15 mg, Intravenous, Once, Tena Curtis MD     sodium chloride 0.9% infusion, 1,000 mL, Intravenous, Continuous, Tena Curtis MD    Current Outpatient Medications:      abiraterone (ZYTIGA) 250 MG tablet, Take 250 mg by mouth 4 times daily, Disp: , Rfl:      amLODIPine (NORVASC) 2.5 MG tablet, Take 2 tablets (5 mg) by mouth daily, Disp: 180 " tablet, Rfl: 2     amLODIPine (NORVASC) 2.5 MG tablet, [AMLODIPINE (NORVASC) 2.5 MG TABLET] Take 1 tablet (2.5 mg total) by mouth daily., Disp: 90 tablet, Rfl: 1     Cholecalciferol 10 MCG (400 UNIT) CAPS, Take 1 capsule by mouth every 24 hours, Disp: , Rfl:      cholecalciferol, vitamin D3, 1,000 unit (25 mcg) tablet, [CHOLECALCIFEROL, VITAMIN D3, 1,000 UNIT (25 MCG) TABLET] Take 2,000 Units by mouth daily., Disp: , Rfl:      denosumab (XGEVA) 120 mg/1.7 mL (70 mg/mL) Soln, [DENOSUMAB (XGEVA) 120 MG/1.7 ML (70 MG/ML) SOLN] Inject 120 mg under the skin every 30 (thirty) days. , Disp: , Rfl:      docusate sodium (COLACE) 100 MG capsule, [DOCUSATE SODIUM (COLACE) 100 MG CAPSULE] Take 100 mg by mouth 2 (two) times a day as needed for constipation., Disp: , Rfl:      enzalutamide (XTANDI) 40 mg cap, [ENZALUTAMIDE (XTANDI) 40 MG CAP] Take 160 mg by mouth daily., Disp: , Rfl:      MEDICATION CANNOT BE REORDERED - PLEASE MANUALLY REORDER AND DISCONTINUE THE OLD ORDER, [LEUPROLIDE, 6 MONTH, (ELIGARD, 6 MONTH,) 45 MG SYRINGE] Inject 45 mg under the skin every 6 (six) months. , Disp: , Rfl:      predniSONE (DELTASONE) 5 MG tablet, Take 5 mg by mouth daily, Disp: , Rfl:     ALLERGIES:  No Known Allergies    FAMILY HISTORY:  Family History   Problem Relation Age of Onset     Cancer Mother      CABG Father      Cerebrovascular Disease Father        SOCIAL HISTORY:   Social History     Tobacco Use     Smoking status: Former Smoker     Packs/day: 0.00     Smokeless tobacco: Never Used     Tobacco comment: Quit 30 years ago.   Substance Use Topics     Alcohol use: Yes     Alcohol/week: 1.0 - 3.0 standard drink     Comment: Alcoholic Drinks/day: 3 times a week.     Drug use: No        PHYSICAL EXAM:    Vitals: BP 98/61   Pulse 83   Temp 97  F (36.1  C) (Temporal)   Resp 16   Wt 95.3 kg (210 lb)   SpO2 96%   BMI 31.01 kg/m     General:. Alert and interactive, comfortable appearing.  HENT: Oropharynx without erythema or  exudates. Dry mucous membranes. TMs clear bilaterally.  Eyes: Pupils mid-sized and equally reactive.   Neck: Full AROM.  No midline tenderness to palpation.  Cardiovascular: Regular rate and rhythm. Peripheral pulses 2+ bilaterally.  Pulmonary: Normal work of breathing. Lung sounds clear and equal throughout, no wheezes or crackles.   Chest: No chest wall tenderness or deformities. Tunnel catheter in right anterior chest wall, site appears clean and dry without redness.   Abdomen: Soft, nondistended. Nontender without guarding or rebound. Obese.  Extremities: Normal ROM of all major joints. No lower extremity edema.   Skin: Warm and dry. Normal skin color.   Neuro: Speech clear. CNs grossly intact. Moves all extremities appropriately. Strength and sensation grossly intact to all extremities.   Psych: Normal affect/mood, cooperative, memory appropriate.     LAB:  All pertinent labs reviewed and interpreted.  Labs Ordered and Resulted from Time of ED Arrival to Time of ED Departure   COMPREHENSIVE METABOLIC PANEL - Abnormal       Result Value    Sodium 137      Potassium 3.6      Chloride 106      Carbon Dioxide (CO2) 21 (*)     Anion Gap 10      Urea Nitrogen 31 (*)     Creatinine 1.13      Calcium 8.0 (*)     Glucose 108      Alkaline Phosphatase 47       (*)     ALT 50 (*)     Protein Total 5.9 (*)     Albumin 3.3 (*)     Bilirubin Total 1.2 (*)     GFR Estimate 65     CBC WITH PLATELETS AND DIFFERENTIAL - Abnormal    WBC Count 6.3      RBC Count 4.13 (*)     Hemoglobin 13.2 (*)     Hematocrit 38.3 (*)     MCV 93      MCH 32.0      MCHC 34.5      RDW 11.9      Platelet Count 119 (*)     % Neutrophils 81      % Lymphocytes 8      % Monocytes 9      % Eosinophils 1      % Basophils 0      % Immature Granulocytes 1      NRBCs per 100 WBC 0      Absolute Neutrophils 5.2      Absolute Lymphocytes 0.5 (*)     Absolute Monocytes 0.5      Absolute Eosinophils 0.0      Absolute Basophils 0.0      Absolute Immature  Granulocytes 0.0      Absolute NRBCs 0.0         EKG:   Sinus rhythm  No abnormal intervals or arrhythmias  Some nonspecific ST and T wave abnormality but no signs of acute ischemia  When compared with ECG of 4-16-21, some very subtle T wave flattening/inversion in the anterolateral leads  I have independently reviewed and interpreted the EKG(s) documented above.       I, Cassie Gonzales, am serving as a scribe to document services personally performed by Dr. Tena Curtis  based on my observation and the provider's statements to me. I, Tena Curtis MD attest that Cassie Gonzales is acting in a scribe capacity, has observed my performance of the services and has documented them in accordance with my direction.      Tena Curtis M.D.  Emergency Medicine  [facility] EMERGENCY DEPARTMENT  [address] [place] [id number]  [phone]  Dept: [phone]         Tena Curtis MD  11/13/21 0236

## 2021-11-13 NOTE — ED TRIAGE NOTES
Patient had cancer treatment yesterday and since then has been having pain all over and no appetite.

## 2021-11-18 ENCOUNTER — TRANSFERRED RECORDS (OUTPATIENT)
Dept: HEALTH INFORMATION MANAGEMENT | Facility: CLINIC | Age: 71
End: 2021-11-18
Payer: MEDICARE

## 2021-11-22 ENCOUNTER — HOSPITAL ENCOUNTER (OUTPATIENT)
Dept: INTERVENTIONAL RADIOLOGY/VASCULAR | Facility: HOSPITAL | Age: 71
Discharge: HOME | End: 2021-11-22
Attending: UROLOGY | Admitting: UROLOGY
Payer: MEDICARE

## 2021-11-22 VITALS
RESPIRATION RATE: 20 BRPM | DIASTOLIC BLOOD PRESSURE: 90 MMHG | SYSTOLIC BLOOD PRESSURE: 152 MMHG | HEART RATE: 59 BPM | OXYGEN SATURATION: 97 %

## 2021-11-22 DIAGNOSIS — C61 PROSTATE CANCER (H): ICD-10-CM

## 2021-11-22 PROCEDURE — 36589 REMOVAL TUNNELED CV CATH: CPT

## 2021-11-22 NOTE — IP AVS SNAPSHOT
[facility] Interventional Radiology  [address] [place] [id number]  Phone: [phone]  Fax: [phone]                                  After Visit Summary   11/22/2021    Venkatesh Alvarado   MRN: [id number]           After Visit Summary Signature Page    I have received my discharge instructions, and my questions have been answered. I have discussed any challenges I see with this plan with the nurse or doctor.    ..........................................................................................................................................  Patient/Patient Representative Signature      ..........................................................................................................................................  Patient Representative Print Name and Relationship to Patient    ..................................................               ................................................  Date                                   Time    ..........................................................................................................................................  Reviewed by Signature/Title    ...................................................              ..............................................  Date                                               Time          22EPIC Rev 08/18

## 2021-11-22 NOTE — DISCHARGE INSTRUCTIONS
Tunneled Catheter Removal Discharge Instructions  You had your tunneled venous catheter removed today. Please follow the below instructions following your catheter removal:    Care Instructions:  - Ok to remove dressing from catheter exit site 24-48 hours after catheter removal.  - Avoid tub baths, Jacuzzi and pool soaks for 3 days.  - You may shower beginning tomorrow. Do not scrub site until well healed; pat dry.  - If you experience significant bleeding at site, apply pressure with hands above the clavicle bone, sit upright. If bleeding does not resolve quickly, seek immediate medical assistance.    Seek medical assistance for any of the following:   - Uncontrolled bleeding.  - You have a fever (greater than 101 F (38.3C)).  - Purulent (yellow/green/foul smelling) drainage from previous catheter insertion site.  - Increasing redness at previous catheter insertion site.  - Increasing pain at previous catheter insertion site.  - Increasing swelling at previous catheter insertion site    Please contact [facility] at [phone] with questions or concerns.

## 2021-11-22 NOTE — PROGRESS NOTES
No bleeding noted on dressing after cath removed. Pt verbalized understanding of discharge instructions. No questions at this time. Declined anything to eat or drink.  Pt walked to elevator.

## 2021-11-22 NOTE — PROGRESS NOTES
Interventional Radiology - Procedure Documentation  11/22/2021    Successful tunneled venous catheter removal.     No immediate complications.    See dictation for full procedure report.    Elizabeth Neil, CNP  Interventional Radiology

## 2021-11-23 ENCOUNTER — TELEPHONE (OUTPATIENT)
Dept: INTERVENTIONAL RADIOLOGY/VASCULAR | Facility: HOSPITAL | Age: 71
End: 2021-11-23
Payer: MEDICARE

## 2021-12-16 ENCOUNTER — TRANSFERRED RECORDS (OUTPATIENT)
Dept: HEALTH INFORMATION MANAGEMENT | Facility: CLINIC | Age: 71
End: 2021-12-16
Payer: MEDICARE

## 2022-01-07 ENCOUNTER — TRANSFERRED RECORDS (OUTPATIENT)
Dept: HEALTH INFORMATION MANAGEMENT | Facility: CLINIC | Age: 72
End: 2022-01-07
Payer: MEDICARE

## 2022-02-16 ENCOUNTER — TRANSFERRED RECORDS (OUTPATIENT)
Dept: HEALTH INFORMATION MANAGEMENT | Facility: CLINIC | Age: 72
End: 2022-02-16
Payer: MEDICARE

## 2022-02-24 ENCOUNTER — HOSPITAL ENCOUNTER (OUTPATIENT)
Dept: PET IMAGING | Facility: HOSPITAL | Age: 72
Discharge: HOME | End: 2022-02-24
Attending: INTERNAL MEDICINE | Admitting: INTERNAL MEDICINE
Payer: MEDICARE

## 2022-02-24 DIAGNOSIS — C61 PROSTATE CANCER (H): ICD-10-CM

## 2022-02-24 PROCEDURE — A9595 HC RX 343: HCPCS | Performed by: INTERNAL MEDICINE

## 2022-02-24 PROCEDURE — 78815 PET IMAGE W/CT SKULL-THIGH: CPT | Mod: 26

## 2022-02-24 PROCEDURE — 78815 PET IMAGE W/CT SKULL-THIGH: CPT | Mod: PI

## 2022-02-24 PROCEDURE — 343N000001 HC RX 343: Performed by: INTERNAL MEDICINE

## 2022-02-24 RX ADMIN — PIFLUFOLASTAT F-18 9.43 MILLICURIE: 80 INJECTION INTRAVENOUS at 12:26

## 2022-03-04 ENCOUNTER — TRANSFERRED RECORDS (OUTPATIENT)
Dept: HEALTH INFORMATION MANAGEMENT | Facility: CLINIC | Age: 72
End: 2022-03-04
Payer: MEDICARE

## 2022-03-07 ENCOUNTER — TRANSFERRED RECORDS (OUTPATIENT)
Dept: HEALTH INFORMATION MANAGEMENT | Facility: CLINIC | Age: 72
End: 2022-03-07
Payer: MEDICARE

## 2022-03-17 ENCOUNTER — TRANSFERRED RECORDS (OUTPATIENT)
Dept: HEALTH INFORMATION MANAGEMENT | Facility: CLINIC | Age: 72
End: 2022-03-17
Payer: MEDICARE

## 2022-03-21 ENCOUNTER — TRANSFERRED RECORDS (OUTPATIENT)
Dept: HEALTH INFORMATION MANAGEMENT | Facility: CLINIC | Age: 72
End: 2022-03-21
Payer: MEDICARE

## 2022-03-23 ENCOUNTER — HOSPITAL ENCOUNTER (EMERGENCY)
Facility: HOSPITAL | Age: 72
Discharge: HOME | End: 2022-03-23
Attending: EMERGENCY MEDICINE | Admitting: EMERGENCY MEDICINE
Payer: MEDICARE

## 2022-03-23 ENCOUNTER — APPOINTMENT (OUTPATIENT)
Dept: CT IMAGING | Facility: HOSPITAL | Age: 72
End: 2022-03-23
Attending: EMERGENCY MEDICINE
Payer: MEDICARE

## 2022-03-23 VITALS
BODY MASS INDEX: 31.25 KG/M2 | DIASTOLIC BLOOD PRESSURE: 72 MMHG | RESPIRATION RATE: 20 BRPM | HEART RATE: 66 BPM | OXYGEN SATURATION: 97 % | WEIGHT: 211 LBS | SYSTOLIC BLOOD PRESSURE: 134 MMHG | TEMPERATURE: 97.9 F | HEIGHT: 69 IN

## 2022-03-23 DIAGNOSIS — C61 PROSTATE CANCER METASTATIC TO BONE (H): ICD-10-CM

## 2022-03-23 DIAGNOSIS — R07.89 ATYPICAL CHEST PAIN: ICD-10-CM

## 2022-03-23 DIAGNOSIS — C79.51 PROSTATE CANCER METASTATIC TO BONE (H): ICD-10-CM

## 2022-03-23 PROBLEM — R97.20 RAISED PROSTATE SPECIFIC ANTIGEN: Status: ACTIVE | Noted: 2019-10-08

## 2022-03-23 LAB
ABO/RH(D): NORMAL
ALBUMIN SERPL-MCNC: 3.6 G/DL (ref 3.5–5)
ALP SERPL-CCNC: 69 U/L (ref 45–120)
ALT SERPL W P-5'-P-CCNC: 14 U/L (ref 0–45)
ANION GAP SERPL CALCULATED.3IONS-SCNC: 10 MMOL/L (ref 5–18)
ANTIBODY SCREEN: NEGATIVE
AST SERPL W P-5'-P-CCNC: 18 U/L (ref 0–40)
BASOPHILS # BLD MANUAL: 0 10E3/UL (ref 0–0.2)
BASOPHILS NFR BLD MANUAL: 0 %
BILIRUB SERPL-MCNC: 0.5 MG/DL (ref 0–1)
BNP SERPL-MCNC: 30 PG/ML (ref 0–67)
BUN SERPL-MCNC: 17 MG/DL (ref 8–28)
C REACTIVE PROTEIN LHE: 1.1 MG/DL (ref 0–0.8)
CALCIUM SERPL-MCNC: 9 MG/DL (ref 8.5–10.5)
CHLORIDE BLD-SCNC: 107 MMOL/L (ref 98–107)
CO2 SERPL-SCNC: 22 MMOL/L (ref 22–31)
CREAT SERPL-MCNC: 0.88 MG/DL (ref 0.7–1.3)
EOSINOPHIL # BLD MANUAL: 0 10E3/UL (ref 0–0.7)
EOSINOPHIL NFR BLD MANUAL: 0 %
ERYTHROCYTE [DISTWIDTH] IN BLOOD BY AUTOMATED COUNT: 11.7 % (ref 10–15)
GFR SERPL CREATININE-BSD FRML MDRD: >90 ML/MIN/1.73M2
GLUCOSE BLD-MCNC: 100 MG/DL (ref 70–125)
HCT VFR BLD AUTO: 36.7 % (ref 40–53)
HGB BLD-MCNC: 12.6 G/DL (ref 13.3–17.7)
INR PPP: 1.26 (ref 0.9–1.15)
INTERNAL QC CHECK POCT: NORMAL
LACTATE SERPL-SCNC: 0.9 MMOL/L (ref 0.7–2)
LIPASE SERPL-CCNC: 26 U/L (ref 0–52)
LYMPHOCYTES # BLD MANUAL: 1.4 10E3/UL (ref 0.8–5.3)
LYMPHOCYTES NFR BLD MANUAL: 48 %
MAGNESIUM SERPL-MCNC: 1.9 MG/DL (ref 1.8–2.6)
MCH RBC QN AUTO: 31.8 PG (ref 26.5–33)
MCHC RBC AUTO-ENTMCNC: 34.3 G/DL (ref 31.5–36.5)
MCV RBC AUTO: 93 FL (ref 78–100)
MONOCYTES # BLD MANUAL: 0.4 10E3/UL (ref 0–1.3)
MONOCYTES NFR BLD MANUAL: 14 %
NEUTROPHILS # BLD MANUAL: 1.1 10E3/UL (ref 1.6–8.3)
NEUTROPHILS NFR BLD MANUAL: 38 %
OCCULT BLOOD POCT: NEGATIVE
PLAT MORPH BLD: ABNORMAL
PLATELET # BLD AUTO: 157 10E3/UL (ref 150–450)
POTASSIUM BLD-SCNC: 3.9 MMOL/L (ref 3.5–5)
PROCALCITONIN SERPL-MCNC: 0.06 NG/ML (ref 0–0.49)
PROT SERPL-MCNC: 6.7 G/DL (ref 6–8)
RBC # BLD AUTO: 3.96 10E6/UL (ref 4.4–5.9)
RBC MORPH BLD: ABNORMAL
SODIUM SERPL-SCNC: 139 MMOL/L (ref 136–145)
SPECIMEN EXPIRATION DATE: NORMAL
TEST CARD EXPIRATION DATE POC: NORMAL
TEST CARD LOT NUMBER: NORMAL
TROPONIN I SERPL-MCNC: <0.01 NG/ML (ref 0–0.29)
VARIANT LYMPHS BLD QL SMEAR: PRESENT
WBC # BLD AUTO: 3 10E3/UL (ref 4–11)

## 2022-03-23 PROCEDURE — 85027 COMPLETE CBC AUTOMATED: CPT | Performed by: EMERGENCY MEDICINE

## 2022-03-23 PROCEDURE — 85610 PROTHROMBIN TIME: CPT | Performed by: EMERGENCY MEDICINE

## 2022-03-23 PROCEDURE — 93005 ELECTROCARDIOGRAM TRACING: CPT | Performed by: STUDENT IN AN ORGANIZED HEALTH CARE EDUCATION/TRAINING PROGRAM

## 2022-03-23 PROCEDURE — 83880 ASSAY OF NATRIURETIC PEPTIDE: CPT | Performed by: EMERGENCY MEDICINE

## 2022-03-23 PROCEDURE — 80053 COMPREHEN METABOLIC PANEL: CPT | Performed by: EMERGENCY MEDICINE

## 2022-03-23 PROCEDURE — 84145 PROCALCITONIN (PCT): CPT | Performed by: EMERGENCY MEDICINE

## 2022-03-23 PROCEDURE — 82272 OCCULT BLD FECES 1-3 TESTS: CPT | Performed by: EMERGENCY MEDICINE

## 2022-03-23 PROCEDURE — 84484 ASSAY OF TROPONIN QUANT: CPT | Performed by: EMERGENCY MEDICINE

## 2022-03-23 PROCEDURE — 36415 COLL VENOUS BLD VENIPUNCTURE: CPT | Performed by: EMERGENCY MEDICINE

## 2022-03-23 PROCEDURE — 83605 ASSAY OF LACTIC ACID: CPT | Performed by: EMERGENCY MEDICINE

## 2022-03-23 PROCEDURE — 93005 ELECTROCARDIOGRAM TRACING: CPT | Performed by: EMERGENCY MEDICINE

## 2022-03-23 PROCEDURE — 83690 ASSAY OF LIPASE: CPT | Performed by: EMERGENCY MEDICINE

## 2022-03-23 PROCEDURE — 86140 C-REACTIVE PROTEIN: CPT | Performed by: EMERGENCY MEDICINE

## 2022-03-23 PROCEDURE — 83735 ASSAY OF MAGNESIUM: CPT | Performed by: EMERGENCY MEDICINE

## 2022-03-23 PROCEDURE — 86850 RBC ANTIBODY SCREEN: CPT | Performed by: EMERGENCY MEDICINE

## 2022-03-23 PROCEDURE — 250N000011 HC RX IP 250 OP 636: Performed by: EMERGENCY MEDICINE

## 2022-03-23 PROCEDURE — 99285 EMERGENCY DEPT VISIT HI MDM: CPT | Mod: 25

## 2022-03-23 PROCEDURE — 71275 CT ANGIOGRAPHY CHEST: CPT

## 2022-03-23 RX ORDER — IOPAMIDOL 755 MG/ML
100 INJECTION, SOLUTION INTRAVASCULAR ONCE
Status: COMPLETED | OUTPATIENT
Start: 2022-03-23 | End: 2022-03-23

## 2022-03-23 RX ADMIN — IOPAMIDOL 100 ML: 755 INJECTION, SOLUTION INTRAVENOUS at 16:19

## 2022-03-23 ASSESSMENT — ENCOUNTER SYMPTOMS
DYSURIA: 0
ABDOMINAL PAIN: 0
NAUSEA: 0
BLOOD IN STOOL: 1
VOMITING: 0
HEMATURIA: 0
BACK PAIN: 1

## 2022-03-23 NOTE — ED PROVIDER NOTES
"ED Provider In Triage Note  [facility]  Encounter Date: Mar 23, 2022    Chief Complaint   Patient presents with     Chest Pain     Back Pain     Possible reaction to chemo       Brief HPI:   Venkatesh Alvarado is a 71 year old male presenting to the Emergency Department with a chief complaint of cp, low back pain, tarry stool.  Metastatic cancer, on chemo.  Severe chest pain last night, anterior across chest.  Now resolved.  Took oxycodone prior to ED arrival.  Also low back pain.  This morning had dark tarry stool.  Not on anticoagulation.  New chemo 10 days ago.  48 hours ago noted to be neutropenic.  No fever.      Brief Physical Exam:  /80   Pulse 79   Temp 97.9  F (36.6  C) (Oral)   Resp 18   Ht 1.753 m (5' 9\")   Wt 95.7 kg (211 lb)   SpO2 98%   BMI 31.16 kg/m    General: Non-toxic appearing  HEENT: Atraumatic  Resp: No respiratory distress  Abdomen: Non-peritoneal  Neuro: Alert, oriented, answers questions appropriately  Psych: Behavior appropriate      Plan Initiated in Triage:  Orders Placed This Encounter     INR     Comprehensive metabolic panel     Lipase     Lactic acid whole blood     Magnesium     Troponin I     B-Type Natriuretic Peptide ([facility] Only)     Asymptomatic COVID-19 Virus (Coronavirus) by PCR     Procalcitonin       PIT Dispo:   Place patient in the next available ED bed    James Proctor MD on 3/23/2022 at 2:29 PM    Patient was evaluated by the Physician in Triage due to a limitation of available rooms in the Emergency Department. A plan of care was discussed based on the information obtained on the initial evaluation and patient was consuled to return back to the Emergency Department lobby after this initial evalutaiton until results were obtained or a room became available in the Emergency Department. Patient was counseled not to leave prior to receiving the results of their workup.      James Proctor MD  03/23/22 1431    "

## 2022-03-23 NOTE — DISCHARGE INSTRUCTIONS
Continue all of your previously-prescribed medications.    For constipation, do the following:  - take over-the-counter Miralax 2 capfuls daily  - take over-the-counter docusate (Colace) 300mg daily  - take over-the-counter senna 17.2mg daily  - drink plenty of fluids  - eat at least 30g of fiber daily  - exercise at least 30 minutes daily  - try using a Squatty Potty (or something similar) to elevate your feet while sitting on the toilet (helps relax your pelvic floor muscles)    Follow up with your Primary Care provider in 2 days for a recheck.    Return to the Emergency Department for any difficulty breathing, severe worsening, or any other concerns.

## 2022-03-23 NOTE — ED PROVIDER NOTES
"EMERGENCY DEPARTMENT ENCOUNTER      NAME: Venkatesh Alvarado  AGE: 71 year old male  YOB: 1950  MRN: [id number]  EVALUATION DATE & TIME: 3/23/2022  2:32 PM    PCP: Siva Rios    ED PROVIDER: Kenroy Brewster M.D.      Chief Complaint   Patient presents with     Chest Pain     Back Pain     Possible reaction to chemo         IMPRESSION  1. Atypical chest pain    2. Prostate cancer metastatic to bone (H)        PLAN  - close clinic follow up  - discharge to home    ED COURSE & MEDICAL DECISION MAKING    ED Course as of 03/23/22 1710   Wed Mar 23, 2022   1458 71yoM with history of metastatic prostate cancer (bony mets to T2-4, T11, left 2nd rib, left femur, left ischium), HTN presenting for evaluation of chest pain. Reports he got chemo early last week and has had fatigue since then. Last night, developed new chest \"tightness\" across his whole chest---no difficulty breathing, pleuritic or exertional symptoms. Resolved with Tylenol. Ongoing low back pain (associated with his bony mets) with no neuro complaints. This morning, notes he had a new dark tarry stool; denies any pain or gross blood; denies any history of GI bleed. Called clinic and directed to the ED. Denies any chest pain today and states his back pain after taking his previously-prescribed oxycodone. Has no other complaints at this time.    Normal vitals on presentation. Calm on exam with clear lungs, normal work of breathing, benign abdomen with no tenderness whatsoever, no midline or paraspinal back tenderness with normal neuro exam to BLE, rectal exam with no hemorrhoid/fissure and guaiac-negative brown stool in rectal vault. Doubt GI bleed. New chest pain with known cancer certainly notable and warrants workup; will obtain CT to rule out PE. Patient comfortable with this plan; no further questions at this time.   1708 EKG unremarkable with no ischemic changes; troponin negative; doubt ACS or primary cardiac etiology. CT chest " unremarkable as well with no PE, pulmonary edema, infiltrate; no noted worsened of known bony mets. Procal negative; doubt bacterial pneumonia or bacteremia. Labs with hemoglobin 12.6, WBC with ANC 1.1 (doubt current infection though) with CRP just 1.1. Otherwise no ANGEL, no glaring electrolyte abnormality. Patient normal vitals, no complaints, reassuring exam on recheck; doubt ongoing emergent life-threatening etiology; ok for outpatient management. Patient wants to go home. Patient able to tolerate PO and walk without difficulty. Return precautions and need for clinic follow up discussed and understood. No further questions at the time of discharge.       3:17 PM I met with the patient for the initial interview and physical examination. Discussed plan for treatment and workup in the ED.  5:06 PM I updated patient with lab and imaging results. Patient is agreeable for discharge.        This patient involved a high degree of complexity in medical decision making, as significant risks were present and assessed.    Broad differential considered for this patient presenting, including but not limited to:  PE, ACS, metastatic cancer, esophageal spasm, GERD, pulmonary edema, bacterial pneumonia, sepsis, GI bleed    I wore the following PPE during this patient encounter:  N95 mask, face shield w/ eye protection, gloves    MEDICATIONS GIVEN IN THE EMERGENCY DEPARTMENT  Medications   iopamidol (ISOVUE-370) solution 100 mL (100 mLs Intravenous Given 3/23/22 1619)       NEW PRESCRIPTIONS STARTED AT TODAY'S ER VISIT  Current Discharge Medication List      CONTINUE these medications which have NOT CHANGED    Details   abiraterone (ZYTIGA) 250 MG tablet Take 250 mg by mouth 4 times daily      amLODIPine (NORVASC) 2.5 MG tablet Take 2 tablets (5 mg) by mouth daily  Qty: 180 tablet, Refills: 2    Associated Diagnoses: Essential hypertension      Cholecalciferol 10 MCG (400 UNIT) CAPS Take 1 capsule by mouth every 24 hours     "  cholecalciferol, vitamin D3, 1,000 unit (25 mcg) tablet [CHOLECALCIFEROL, VITAMIN D3, 1,000 UNIT (25 MCG) TABLET] Take 2,000 Units by mouth daily.      !! denosumab (XGEVA) 120 mg/1.7 mL (70 mg/mL) Soln [DENOSUMAB (XGEVA) 120 MG/1.7 ML (70 MG/ML) SOLN] Inject 120 mg under the skin every 30 (thirty) days.       docusate sodium (COLACE) 100 MG capsule [DOCUSATE SODIUM (COLACE) 100 MG CAPSULE] Take 100 mg by mouth 2 (two) times a day as needed for constipation.      enzalutamide (XTANDI) 40 mg cap [ENZALUTAMIDE (XTANDI) 40 MG CAP] Take 160 mg by mouth daily.      !! MEDICATION CANNOT BE REORDERED - PLEASE MANUALLY REORDER AND DISCONTINUE THE OLD ORDER [LEUPROLIDE, 6 MONTH, (ELIGARD, 6 MONTH,) 45 MG SYRINGE] Inject 45 mg under the skin every 6 (six) months.       oxyCODONE (ROXICODONE) 5 MG tablet Take 1 tablet (5 mg) by mouth every 8 hours as needed for severe pain  Qty: 10 tablet, Refills: 0      predniSONE (DELTASONE) 5 MG tablet Take 5 mg by mouth daily       !! - Potential duplicate medications found. Please discuss with provider.              =================================================================      HPI  Patient information was obtained from: Patient     Use of : N/A         Venkatesh Alvarado is a 71 year old male with a pertinent history of prostate cancer metastatic to bone, HTN who presents to this ED via walk-in for evaluation of chest pain, tarry stools, back pain.    Patient reports he had onset of chest pain which radiated across his chest last night after dinner. States he initially had trouble sleeping with this but was able to go to sleep after a dose of tylenol. This pain has been resolved today. Patient also notes he had one episode of \"incomplete,\" black, tarry stool earlier today, and thinks he may have had one yesterday as well. He denies any history of GI bleeds. Patient has also been having ongoing pain which shoots down his lower back since starting chemo last week. " Patient's cancer is metastatic to his T2-4, T11, and left ischium. He did take a leftover dose of oxycodone today prior to arrival. Patient denies any urinary symptoms, abdominal pain, nausea, vomiting, or any other complaints.      REVIEW OF SYSTEMS   Review of Systems   Cardiovascular: Positive for chest pain (across chest, resolved).   Gastrointestinal: Positive for blood in stool (black, tarry). Negative for abdominal pain, nausea and vomiting.   Genitourinary: Negative for dysuria and hematuria.   Musculoskeletal: Positive for back pain.   All other systems reviewed and are negative.    All other systems reviewed and are negative except as noted above in HPI.        --------------- MEDICAL HISTORY ---------------  PAST MEDICAL HISTORY:  Past Medical History:   Diagnosis Date     Cancer (H)     prostate     Osteoarthritis      Patient Active Problem List   Diagnosis     Prostate cancer metastatic to bone (H)     Essential hypertension     Primary osteoarthritis of right knee     Pituitary adenoma (H)     Malignant tumor of prostate (H)     Raised prostate specific antigen       PAST SURGICAL HISTORY:  Past Surgical History:   Procedure Laterality Date     INGUINAL HERNIA REPAIR Right      IR CVC TUNNEL PLACEMENT > 5 YRS OF AGE  10/29/2021     IR CVC TUNNEL REMOVAL RIGHT  11/22/2021     PICC AND MIDLINE TEAM LINE INSERTION  9/5/2019            CURRENT MEDICATIONS:    No current facility-administered medications for this encounter.    Current Outpatient Medications:      abiraterone (ZYTIGA) 250 MG tablet, Take 250 mg by mouth 4 times daily, Disp: , Rfl:      amLODIPine (NORVASC) 2.5 MG tablet, Take 2 tablets (5 mg) by mouth daily, Disp: 180 tablet, Rfl: 2     amLODIPine (NORVASC) 2.5 MG tablet, [AMLODIPINE (NORVASC) 2.5 MG TABLET] Take 1 tablet (2.5 mg total) by mouth daily., Disp: 90 tablet, Rfl: 1     Cholecalciferol 10 MCG (400 UNIT) CAPS, Take 1 capsule by mouth every 24 hours, Disp: , Rfl:       cholecalciferol, vitamin D3, 1,000 unit (25 mcg) tablet, [CHOLECALCIFEROL, VITAMIN D3, 1,000 UNIT (25 MCG) TABLET] Take 2,000 Units by mouth daily., Disp: , Rfl:      denosumab (XGEVA) 120 mg/1.7 mL (70 mg/mL) Soln, [DENOSUMAB (XGEVA) 120 MG/1.7 ML (70 MG/ML) SOLN] Inject 120 mg under the skin every 30 (thirty) days. , Disp: , Rfl:      docusate sodium (COLACE) 100 MG capsule, [DOCUSATE SODIUM (COLACE) 100 MG CAPSULE] Take 100 mg by mouth 2 (two) times a day as needed for constipation., Disp: , Rfl:      enzalutamide (XTANDI) 40 mg cap, [ENZALUTAMIDE (XTANDI) 40 MG CAP] Take 160 mg by mouth daily., Disp: , Rfl:      MEDICATION CANNOT BE REORDERED - PLEASE MANUALLY REORDER AND DISCONTINUE THE OLD ORDER, [LEUPROLIDE, 6 MONTH, (ELIGARD, 6 MONTH,) 45 MG SYRINGE] Inject 45 mg under the skin every 6 (six) months. , Disp: , Rfl:      oxyCODONE (ROXICODONE) 5 MG tablet, Take 1 tablet (5 mg) by mouth every 8 hours as needed for severe pain, Disp: 10 tablet, Rfl: 0     predniSONE (DELTASONE) 5 MG tablet, Take 5 mg by mouth daily, Disp: , Rfl:     ALLERGIES:  No Known Allergies    FAMILY HISTORY:  Family History   Problem Relation Age of Onset     Cancer Mother      CABG Father      Cerebrovascular Disease Father        SOCIAL HISTORY:   Social History     Socioeconomic History     Marital status: Single     Spouse name: None     Number of children: None     Years of education: None     Highest education level: None   Occupational History     None   Tobacco Use     Smoking status: Former Smoker     Packs/day: 0.00     Smokeless tobacco: Never Used     Tobacco comment: Quit 30 years ago.   Substance and Sexual Activity     Alcohol use: Yes     Alcohol/week: 1.0 - 3.0 standard drink     Comment: Alcoholic Drinks/day: 3 times a week.     Drug use: No     Sexual activity: Not Currently     Partners: Female   Other Topics Concern     Parent/sibling w/ CABG, MI or angioplasty before 65F 55M? Not Asked   Social History Narrative      "None     Social Determinants of Health     Financial Resource Strain: Not on file   Food Insecurity: Not on file   Transportation Needs: Not on file   Physical Activity: Not on file   Stress: Not on file   Social Connections: Not on file   Intimate Partner Violence: Not on file   Housing Stability: Not on file         --------------- PHYSICAL EXAM ---------------  Nursing notes and vitals reviewed by me.  VITALS:  Vitals:    03/23/22 1428   BP: 119/80   Pulse: 79   Resp: 18   Temp: 97.9  F (36.6  C)   TempSrc: Oral   SpO2: 98%   Weight: 95.7 kg (211 lb)   Height: 1.753 m (5' 9\")       PHYSICAL EXAM:    General:  alert, interactive, no distress  Eyes:  conjunctivae clear, conjugate gaze  HENT:  atraumatic, nose with no rhinorrhea, oropharynx clear  Neck:  no meningismus  Cardiovascular:  HR 70s during exam, regular rhythm, no murmurs, brisk cap refill  Chest:  no chest wall tenderness  Pulmonary:  no stridor, normal phonation, normal work of breathing, clear lungs bilaterally  Abdomen:  soft, nondistended, nontender  :  no CVA tenderness  Back:  no midline or paraspinal back tenderness  Musculoskeletal:  no pretibial edema, no calf tenderness. Gross ROM intact to joints of extremities with no obvious deformities.  Skin:  warm, dry, no rash  Neuro:  awake, alert, answers questions appropriately, follows commands, moves all limbs, negative pronator drift, 5/5 strength to all extremities with sensation to light touch intact  Psych:  calm, normal affect      --------------- RESULTS ---------------  EKG:    Reviewed and interpreted by me.  - NSR at 77bpm, no ST or T wave changes, normal intervals, PVC x1  - resolved anterior/inferior TWI with otherwise no changes from prior on 11/12/21  My read.    LAB:  Reviewed and interpreted by me.  Results for orders placed or performed during the hospital encounter of 03/23/22   CT Chest Pulmonary Embolism w Contrast    Impression    IMPRESSION:  1.  No definite pulmonary " embolism.  2.  Ascending thoracic aortic aneurysm measuring 4 cm.  3.  Sclerotic metastases in the spine and left rib as above.   Result Value Ref Range    INR 1.26 (H) 0.90 - 1.15   Comprehensive metabolic panel   Result Value Ref Range    Sodium 139 136 - 145 mmol/L    Potassium 3.9 3.5 - 5.0 mmol/L    Chloride 107 98 - 107 mmol/L    Carbon Dioxide (CO2) 22 22 - 31 mmol/L    Anion Gap 10 5 - 18 mmol/L    Urea Nitrogen 17 8 - 28 mg/dL    Creatinine 0.88 0.70 - 1.30 mg/dL    Calcium 9.0 8.5 - 10.5 mg/dL    Glucose 100 70 - 125 mg/dL    Alkaline Phosphatase 69 45 - 120 U/L    AST 18 0 - 40 U/L    ALT 14 0 - 45 U/L    Protein Total 6.7 6.0 - 8.0 g/dL    Albumin 3.6 3.5 - 5.0 g/dL    Bilirubin Total 0.5 0.0 - 1.0 mg/dL    GFR Estimate >90 >60 mL/min/1.73m2   Result Value Ref Range    Lipase 26 0 - 52 U/L   Lactic acid whole blood   Result Value Ref Range    Lactic Acid 0.9 0.7 - 2.0 mmol/L   Result Value Ref Range    Magnesium 1.9 1.8 - 2.6 mg/dL   Result Value Ref Range    Troponin I <0.01 0.00 - 0.29 ng/mL   B-Type Natriuretic Peptide ([facility] Only)   Result Value Ref Range    BNP 30 0 - 67 pg/mL   Result Value Ref Range    Procalcitonin 0.06 0.00 - 0.49 ng/mL   CBC with platelets and differential   Result Value Ref Range    WBC Count 3.0 (L) 4.0 - 11.0 10e3/uL    RBC Count 3.96 (L) 4.40 - 5.90 10e6/uL    Hemoglobin 12.6 (L) 13.3 - 17.7 g/dL    Hematocrit 36.7 (L) 40.0 - 53.0 %    MCV 93 78 - 100 fL    MCH 31.8 26.5 - 33.0 pg    MCHC 34.3 31.5 - 36.5 g/dL    RDW 11.7 10.0 - 15.0 %    Platelet Count 157 150 - 450 10e3/uL   CRP inflammation   Result Value Ref Range    CRP 1.1 (H) 0.0-<0.8 mg/dL   Manual Differential   Result Value Ref Range    % Neutrophils 38 %    % Lymphocytes 48 %    % Monocytes 14 %    % Eosinophils 0 %    % Basophils 0 %    Absolute Neutrophils 1.1 (L) 1.6 - 8.3 10e3/uL    Absolute Lymphocytes 1.4 0.8 - 5.3 10e3/uL    Absolute Monocytes 0.4 0.0 - 1.3 10e3/uL    Absolute Eosinophils 0.0 0.0 - 0.7  10e3/uL    Absolute Basophils 0.0 0.0 - 0.2 10e3/uL    RBC Morphology Confirmed RBC Indices     Platelet Assessment  Automated Count Confirmed. Platelet morphology is normal.     Automated Count Confirmed. Platelet morphology is normal.    Reactive Lymphocytes Present (A) None Seen   Occult blood stool POCT   Result Value Ref Range    Occult Blood POCT Negative Negative    Internal QC Check POCT Valid Valid    Test Card Lot Number [id number]     Test Card Expiration Date 7/2024    Adult Type and Screen   Result Value Ref Range    ABO/RH(D) A POS     Antibody Screen Negative Negative    SPECIMEN EXPIRATION DATE [id number]        RADIOLOGY:  Reviewed by me. Please see official radiology report.  Recent Results (from the past 24 hour(s))   CT Chest Pulmonary Embolism w Contrast    Narrative    EXAM: CT CHEST PULMONARY EMBOLISM W CONTRAST  LOCATION: [facility]  DATE/TIME: 3/23/2022 4:17 PM    INDICATION: Shortness of breath, chest pain, low back pain, tarry stool.  COMPARISON: Chest CT dated 9/3/2019, CT of the abdomen pelvis dated 9/27/2021  TECHNIQUE: CT chest pulmonary angiogram during arterial phase injection of IV contrast. Multiplanar reformats and MIP reconstructions were performed. Dose reduction techniques were used.   CONTRAST: [id number] 100ml    FINDINGS:  ANGIOGRAM CHEST: No definite evidence for a pulmonary embolism. Mild aneurysmal dilatation of the ascending thoracic aorta which has a maximum diameter of approximately 4.0 cm.    LUNGS AND PLEURA: Calcified granuloma in the superior segment left lower lobe. There are COPD changes. Mild dependent atelectasis.    MEDIASTINUM/AXILLAE: Unremarkable    UPPER ABDOMEN: There are gallstones.    MUSCULOSKELETAL: There are sclerotic foci in the spine and left second rib consistent with metastatic disease.      Impression    IMPRESSION:  1.  No definite pulmonary embolism.  2.  Ascending thoracic aortic aneurysm measuring 4 cm.  3.  Sclerotic  metastases in the spine and left rib as above.       PROCEDURES:   Procedures   --------------------------------------------------------------------------------   Cardiac telemetry monitoring ordered, reviewed, and interpreted by me while patient was in the Emergency Department. Revealed no acute abnormalities.  --------------------------------------------------------------------------------           I, Jordy Ross, am serving as a scribe to document services personally performed by Dr. Kenroy Brewster based on my observation and the provider's statements to me. I, Kenroy Brewster MD attest that Jordy Ross is acting in a scribe capacity, has observed my performance of the services and has documented them in accordance with my direction.      Kenroy Brewster MD  03/23/22  Emergency Medicine  [facility] EMERGENCY DEPARTMENT  [address] [place] [id number] [phone]  Dept: [phone]       Kenroy Brewster MD  03/23/22 6960

## 2022-03-23 NOTE — ED PROVIDER NOTES
Expected Patient Referral to ED  1:39 PM    Referring Clinic/Provider:  JASON Tracy @ [facility]    Reason for referral/Clinical facts:  72 y/o metastatic prostate ca to bone/LN  New chemo 10d ago  2d ago seen for toxicity check  Wbc 0.8, 0.3 anc  No fever, given nulasta   Called today - last noc chest tightness, sob and this AM tarry stool and continued chest tightness and pain in upper back.  Still no fever.   Hx of htn - on norvasc   Does have T11 bone met that will cause him pain. Rad most recently in jan to this area.   Not anticoag, no hx of dvt/pe     Recommendations provided:  Send to ED for further evaluation    Caller was informed that this institution does possess the capabilities and/or resources to provide for patient and should be transferred to our facility.    Discussed that if direct admit is sought and any hurdles are encountered, this ED would be happy to see the patient and evaluate.    Informed caller that recommendations provided are recommendations based only on the facts provided and that they responsible to accept or reject the advice, or to seek a formal in person consultation as needed and that this ED will see/treat patient should they arrive.      Shaye Mcknight MD  Emergency Medicine  [facility] EMERGENCY DEPARTMENT  [facility] [facility] [id number] [phone]     Shaye Mcknight MD  03/23/22 5053

## 2022-03-23 NOTE — ED TRIAGE NOTES
Pt presents with possible reaction to chemo (according to PA from oncology.) Pt experiencing lower back pain and had chest pain across chest last night. Chest pain has resolved. Pt also reported having a tarry stool this am.

## 2022-03-24 LAB
ATRIAL RATE - MUSE: 77 BPM
DIASTOLIC BLOOD PRESSURE - MUSE: NORMAL MMHG
INTERPRETATION ECG - MUSE: NORMAL
P AXIS - MUSE: 26 DEGREES
PR INTERVAL - MUSE: 166 MS
QRS DURATION - MUSE: 80 MS
QT - MUSE: 366 MS
QTC - MUSE: 414 MS
R AXIS - MUSE: 32 DEGREES
SYSTOLIC BLOOD PRESSURE - MUSE: NORMAL MMHG
T AXIS - MUSE: 52 DEGREES
VENTRICULAR RATE- MUSE: 77 BPM

## 2022-04-04 ENCOUNTER — TRANSFERRED RECORDS (OUTPATIENT)
Dept: HEALTH INFORMATION MANAGEMENT | Facility: CLINIC | Age: 72
End: 2022-04-04
Payer: MEDICARE

## 2022-04-25 ENCOUNTER — TRANSFERRED RECORDS (OUTPATIENT)
Dept: HEALTH INFORMATION MANAGEMENT | Facility: CLINIC | Age: 72
End: 2022-04-25
Payer: MEDICARE

## 2022-05-16 ENCOUNTER — TRANSFERRED RECORDS (OUTPATIENT)
Dept: HEALTH INFORMATION MANAGEMENT | Facility: CLINIC | Age: 72
End: 2022-05-16
Payer: MEDICARE

## 2022-05-29 ENCOUNTER — APPOINTMENT (OUTPATIENT)
Dept: CT IMAGING | Facility: HOSPITAL | Age: 72
DRG: 205 | End: 2022-05-29
Attending: EMERGENCY MEDICINE
Payer: MEDICARE

## 2022-05-29 ENCOUNTER — HOSPITAL ENCOUNTER (INPATIENT)
Facility: HOSPITAL | Age: 72
LOS: 8 days | Discharge: HOME HEALTH | DRG: 205 | End: 2022-06-07
Attending: EMERGENCY MEDICINE | Admitting: STUDENT IN AN ORGANIZED HEALTH CARE EDUCATION/TRAINING PROGRAM
Payer: MEDICARE

## 2022-05-29 DIAGNOSIS — J96.01 ACUTE RESPIRATORY FAILURE WITH HYPOXIA (H): Primary | ICD-10-CM

## 2022-05-29 DIAGNOSIS — E86.1 HYPOTENSION DUE TO HYPOVOLEMIA: ICD-10-CM

## 2022-05-29 DIAGNOSIS — R09.02 HYPOXEMIA: ICD-10-CM

## 2022-05-29 DIAGNOSIS — C61 MALIGNANT TUMOR OF PROSTATE (H): ICD-10-CM

## 2022-05-29 DIAGNOSIS — E86.0 DEHYDRATION: ICD-10-CM

## 2022-05-29 DIAGNOSIS — R06.02 SHORTNESS OF BREATH: ICD-10-CM

## 2022-05-29 LAB
ANION GAP SERPL CALCULATED.3IONS-SCNC: 15 MMOL/L (ref 5–18)
BASOPHILS # BLD MANUAL: 0 10E3/UL (ref 0–0.2)
BASOPHILS NFR BLD MANUAL: 0 %
BNP SERPL-MCNC: 34 PG/ML (ref 0–67)
BUN SERPL-MCNC: 14 MG/DL (ref 8–28)
CALCIUM SERPL-MCNC: 8 MG/DL (ref 8.5–10.5)
CHLORIDE BLD-SCNC: 98 MMOL/L (ref 98–107)
CO2 SERPL-SCNC: 17 MMOL/L (ref 22–31)
CREAT SERPL-MCNC: 1 MG/DL (ref 0.7–1.3)
DACRYOCYTES BLD QL SMEAR: SLIGHT
ELLIPTOCYTES BLD QL SMEAR: SLIGHT
EOSINOPHIL # BLD MANUAL: 0 10E3/UL (ref 0–0.7)
EOSINOPHIL NFR BLD MANUAL: 0 %
ERYTHROCYTE [DISTWIDTH] IN BLOOD BY AUTOMATED COUNT: 14.6 % (ref 10–15)
GFR SERPL CREATININE-BSD FRML MDRD: 81 ML/MIN/1.73M2
GLUCOSE BLD-MCNC: 82 MG/DL (ref 70–125)
HCT VFR BLD AUTO: 31.7 % (ref 40–53)
HGB BLD-MCNC: 10.9 G/DL (ref 13.3–17.7)
HOLD SPECIMEN: NORMAL
HOLD SPECIMEN: NORMAL
LYMPHOCYTES # BLD MANUAL: 1.1 10E3/UL (ref 0.8–5.3)
LYMPHOCYTES NFR BLD MANUAL: 10 %
MAGNESIUM SERPL-MCNC: 2.1 MG/DL (ref 1.8–2.6)
MCH RBC QN AUTO: 31.4 PG (ref 26.5–33)
MCHC RBC AUTO-ENTMCNC: 34.4 G/DL (ref 31.5–36.5)
MCV RBC AUTO: 91 FL (ref 78–100)
METAMYELOCYTES # BLD MANUAL: 0.2 10E3/UL
METAMYELOCYTES NFR BLD MANUAL: 2 %
MONOCYTES # BLD MANUAL: 0.2 10E3/UL (ref 0–1.3)
MONOCYTES NFR BLD MANUAL: 2 %
MYELOCYTES # BLD MANUAL: 1 10E3/UL
MYELOCYTES NFR BLD MANUAL: 9 %
NEUTROPHILS # BLD MANUAL: 8.3 10E3/UL (ref 1.6–8.3)
NEUTROPHILS NFR BLD MANUAL: 77 %
PLAT MORPH BLD: ABNORMAL
PLATELET # BLD AUTO: 188 10E3/UL (ref 150–450)
POLYCHROMASIA BLD QL SMEAR: SLIGHT
POTASSIUM BLD-SCNC: 3.8 MMOL/L (ref 3.5–5)
RBC # BLD AUTO: 3.47 10E6/UL (ref 4.4–5.9)
RBC MORPH BLD: ABNORMAL
SARS-COV-2 RNA RESP QL NAA+PROBE: NEGATIVE
SODIUM SERPL-SCNC: 130 MMOL/L (ref 136–145)
TROPONIN I SERPL-MCNC: <0.01 NG/ML (ref 0–0.29)
WBC # BLD AUTO: 10.8 10E3/UL (ref 4–11)

## 2022-05-29 PROCEDURE — 83735 ASSAY OF MAGNESIUM: CPT | Performed by: EMERGENCY MEDICINE

## 2022-05-29 PROCEDURE — 84484 ASSAY OF TROPONIN QUANT: CPT | Performed by: EMERGENCY MEDICINE

## 2022-05-29 PROCEDURE — 93005 ELECTROCARDIOGRAM TRACING: CPT

## 2022-05-29 PROCEDURE — 96361 HYDRATE IV INFUSION ADD-ON: CPT

## 2022-05-29 PROCEDURE — 85027 COMPLETE CBC AUTOMATED: CPT | Performed by: EMERGENCY MEDICINE

## 2022-05-29 PROCEDURE — 83880 ASSAY OF NATRIURETIC PEPTIDE: CPT | Performed by: EMERGENCY MEDICINE

## 2022-05-29 PROCEDURE — 71275 CT ANGIOGRAPHY CHEST: CPT

## 2022-05-29 PROCEDURE — 258N000003 HC RX IP 258 OP 636: Performed by: EMERGENCY MEDICINE

## 2022-05-29 PROCEDURE — C9803 HOPD COVID-19 SPEC COLLECT: HCPCS

## 2022-05-29 PROCEDURE — 80053 COMPREHEN METABOLIC PANEL: CPT | Performed by: EMERGENCY MEDICINE

## 2022-05-29 PROCEDURE — 87486 CHLMYD PNEUM DNA AMP PROBE: CPT | Performed by: EMERGENCY MEDICINE

## 2022-05-29 PROCEDURE — 99285 EMERGENCY DEPT VISIT HI MDM: CPT | Mod: 25

## 2022-05-29 PROCEDURE — 85007 BL SMEAR W/DIFF WBC COUNT: CPT | Performed by: EMERGENCY MEDICINE

## 2022-05-29 PROCEDURE — 87635 SARS-COV-2 COVID-19 AMP PRB: CPT | Performed by: EMERGENCY MEDICINE

## 2022-05-29 PROCEDURE — 93005 ELECTROCARDIOGRAM TRACING: CPT | Performed by: EMERGENCY MEDICINE

## 2022-05-29 PROCEDURE — 250N000011 HC RX IP 250 OP 636: Performed by: EMERGENCY MEDICINE

## 2022-05-29 PROCEDURE — 36415 COLL VENOUS BLD VENIPUNCTURE: CPT | Performed by: EMERGENCY MEDICINE

## 2022-05-29 PROCEDURE — 82248 BILIRUBIN DIRECT: CPT | Performed by: STUDENT IN AN ORGANIZED HEALTH CARE EDUCATION/TRAINING PROGRAM

## 2022-05-29 RX ORDER — IOPAMIDOL 755 MG/ML
100 INJECTION, SOLUTION INTRAVASCULAR ONCE
Status: COMPLETED | OUTPATIENT
Start: 2022-05-29 | End: 2022-05-29

## 2022-05-29 RX ADMIN — IOPAMIDOL 100 ML: 755 INJECTION, SOLUTION INTRAVENOUS at 23:31

## 2022-05-29 RX ADMIN — SODIUM CHLORIDE 1000 ML: 9 INJECTION, SOLUTION INTRAVENOUS at 21:34

## 2022-05-30 ENCOUNTER — APPOINTMENT (OUTPATIENT)
Dept: PHYSICAL THERAPY | Facility: HOSPITAL | Age: 72
DRG: 205 | End: 2022-05-30
Attending: STUDENT IN AN ORGANIZED HEALTH CARE EDUCATION/TRAINING PROGRAM
Payer: MEDICARE

## 2022-05-30 PROBLEM — R06.02 SHORTNESS OF BREATH: Status: RESOLVED | Noted: 2022-05-30 | Resolved: 2022-05-30

## 2022-05-30 PROBLEM — E86.0 DEHYDRATION: Status: ACTIVE | Noted: 2022-05-30

## 2022-05-30 PROBLEM — E86.1 HYPOTENSION DUE TO HYPOVOLEMIA: Status: ACTIVE | Noted: 2022-05-30

## 2022-05-30 PROBLEM — I95.1 ORTHOSTATIC HYPOTENSION: Status: ACTIVE | Noted: 2022-05-30

## 2022-05-30 PROBLEM — D64.81 ANEMIA DUE TO CHEMOTHERAPY: Status: ACTIVE | Noted: 2022-05-30

## 2022-05-30 PROBLEM — R06.02 SHORTNESS OF BREATH: Status: ACTIVE | Noted: 2022-05-30

## 2022-05-30 PROBLEM — J96.01 ACUTE RESPIRATORY FAILURE WITH HYPOXIA (H): Status: ACTIVE | Noted: 2022-05-30

## 2022-05-30 PROBLEM — T45.1X5A ANEMIA DUE TO CHEMOTHERAPY: Status: ACTIVE | Noted: 2022-05-30

## 2022-05-30 PROBLEM — E87.1 HYPONATREMIA: Status: ACTIVE | Noted: 2022-05-30

## 2022-05-30 PROBLEM — R09.02 HYPOXEMIA: Status: ACTIVE | Noted: 2022-05-30

## 2022-05-30 PROBLEM — E43 SEVERE MALNUTRITION (H): Status: ACTIVE | Noted: 2022-05-30

## 2022-05-30 PROBLEM — R09.02 HYPOXEMIA: Status: RESOLVED | Noted: 2022-05-30 | Resolved: 2022-05-30

## 2022-05-30 LAB
ALBUMIN SERPL-MCNC: 2.9 G/DL (ref 3.5–5)
ALBUMIN UR-MCNC: 20 MG/DL
ALP SERPL-CCNC: 68 U/L (ref 45–120)
ALT SERPL W P-5'-P-CCNC: 16 U/L (ref 0–45)
APPEARANCE UR: CLEAR
AST SERPL W P-5'-P-CCNC: 34 U/L (ref 0–40)
BILIRUB DIRECT SERPL-MCNC: 0.2 MG/DL
BILIRUB SERPL-MCNC: 0.8 MG/DL (ref 0–1)
BILIRUB UR QL STRIP: NEGATIVE
C PNEUM DNA SPEC QL NAA+PROBE: NOT DETECTED
COLOR UR AUTO: ABNORMAL
FLUAV H1 2009 PAND RNA SPEC QL NAA+PROBE: NOT DETECTED
FLUAV H1 RNA SPEC QL NAA+PROBE: NOT DETECTED
FLUAV H3 RNA SPEC QL NAA+PROBE: NOT DETECTED
FLUAV RNA SPEC QL NAA+PROBE: NOT DETECTED
FLUBV RNA SPEC QL NAA+PROBE: NOT DETECTED
GLUCOSE UR STRIP-MCNC: NEGATIVE MG/DL
HADV DNA SPEC QL NAA+PROBE: NOT DETECTED
HCOV PNL SPEC NAA+PROBE: NOT DETECTED
HGB UR QL STRIP: NEGATIVE
HMPV RNA SPEC QL NAA+PROBE: NOT DETECTED
HPIV1 RNA SPEC QL NAA+PROBE: NOT DETECTED
HPIV2 RNA SPEC QL NAA+PROBE: NOT DETECTED
HPIV3 RNA SPEC QL NAA+PROBE: NOT DETECTED
HPIV4 RNA SPEC QL NAA+PROBE: NOT DETECTED
KETONES UR STRIP-MCNC: 80 MG/DL
LEUKOCYTE ESTERASE UR QL STRIP: NEGATIVE
M PNEUMO DNA SPEC QL NAA+PROBE: NOT DETECTED
NITRATE UR QL: NEGATIVE
PH UR STRIP: 6 [PH] (ref 5–7)
PROCALCITONIN SERPL-MCNC: 0.09 NG/ML (ref 0–0.49)
PROT SERPL-MCNC: 6.4 G/DL (ref 6–8)
RBC URINE: 0 /HPF
RSV RNA SPEC QL NAA+PROBE: NOT DETECTED
RSV RNA SPEC QL NAA+PROBE: NOT DETECTED
RV+EV RNA SPEC QL NAA+PROBE: NOT DETECTED
SP GR UR STRIP: >1.05 (ref 1–1.03)
UROBILINOGEN UR STRIP-MCNC: <2 MG/DL
WBC URINE: 0 /HPF

## 2022-05-30 PROCEDURE — 96361 HYDRATE IV INFUSION ADD-ON: CPT

## 2022-05-30 PROCEDURE — 87086 URINE CULTURE/COLONY COUNT: CPT | Performed by: EMERGENCY MEDICINE

## 2022-05-30 PROCEDURE — 81001 URINALYSIS AUTO W/SCOPE: CPT | Performed by: EMERGENCY MEDICINE

## 2022-05-30 PROCEDURE — 96366 THER/PROPH/DIAG IV INF ADDON: CPT

## 2022-05-30 PROCEDURE — 96365 THER/PROPH/DIAG IV INF INIT: CPT | Mod: 59

## 2022-05-30 PROCEDURE — 94640 AIRWAY INHALATION TREATMENT: CPT

## 2022-05-30 PROCEDURE — 999N000157 HC STATISTIC RCP TIME EA 10 MIN

## 2022-05-30 PROCEDURE — 99207 PR NO BILLABLE SERVICE THIS VISIT: CPT | Performed by: INTERNAL MEDICINE

## 2022-05-30 PROCEDURE — 84145 PROCALCITONIN (PCT): CPT | Performed by: EMERGENCY MEDICINE

## 2022-05-30 PROCEDURE — 99222 1ST HOSP IP/OBS MODERATE 55: CPT | Mod: AI | Performed by: STUDENT IN AN ORGANIZED HEALTH CARE EDUCATION/TRAINING PROGRAM

## 2022-05-30 PROCEDURE — 87040 BLOOD CULTURE FOR BACTERIA: CPT | Performed by: EMERGENCY MEDICINE

## 2022-05-30 PROCEDURE — 120N000001 HC R&B MED SURG/OB

## 2022-05-30 PROCEDURE — 97530 THERAPEUTIC ACTIVITIES: CPT | Mod: GP

## 2022-05-30 PROCEDURE — 36415 COLL VENOUS BLD VENIPUNCTURE: CPT | Performed by: EMERGENCY MEDICINE

## 2022-05-30 PROCEDURE — 258N000003 HC RX IP 258 OP 636: Performed by: STUDENT IN AN ORGANIZED HEALTH CARE EDUCATION/TRAINING PROGRAM

## 2022-05-30 PROCEDURE — 94640 AIRWAY INHALATION TREATMENT: CPT | Mod: 76

## 2022-05-30 PROCEDURE — 250N000011 HC RX IP 250 OP 636: Performed by: STUDENT IN AN ORGANIZED HEALTH CARE EDUCATION/TRAINING PROGRAM

## 2022-05-30 PROCEDURE — 250N000009 HC RX 250: Performed by: INTERNAL MEDICINE

## 2022-05-30 PROCEDURE — 97161 PT EVAL LOW COMPLEX 20 MIN: CPT | Mod: GP

## 2022-05-30 PROCEDURE — 258N000003 HC RX IP 258 OP 636: Performed by: EMERGENCY MEDICINE

## 2022-05-30 RX ORDER — LIDOCAINE 40 MG/G
CREAM TOPICAL
Status: DISCONTINUED | OUTPATIENT
Start: 2022-05-30 | End: 2022-06-07 | Stop reason: HOSPADM

## 2022-05-30 RX ORDER — AMOXICILLIN 250 MG
2 CAPSULE ORAL 2 TIMES DAILY PRN
Status: DISCONTINUED | OUTPATIENT
Start: 2022-05-30 | End: 2022-06-07 | Stop reason: HOSPADM

## 2022-05-30 RX ORDER — PROCHLORPERAZINE 25 MG
12.5 SUPPOSITORY, RECTAL RECTAL EVERY 12 HOURS PRN
Status: DISCONTINUED | OUTPATIENT
Start: 2022-05-30 | End: 2022-06-07 | Stop reason: HOSPADM

## 2022-05-30 RX ORDER — NALOXONE HYDROCHLORIDE 0.4 MG/ML
0.4 INJECTION, SOLUTION INTRAMUSCULAR; INTRAVENOUS; SUBCUTANEOUS
Status: DISCONTINUED | OUTPATIENT
Start: 2022-05-30 | End: 2022-06-07 | Stop reason: HOSPADM

## 2022-05-30 RX ORDER — IPRATROPIUM BROMIDE AND ALBUTEROL SULFATE 2.5; .5 MG/3ML; MG/3ML
3 SOLUTION RESPIRATORY (INHALATION)
Status: DISCONTINUED | OUTPATIENT
Start: 2022-05-30 | End: 2022-06-01

## 2022-05-30 RX ORDER — NALOXONE HYDROCHLORIDE 0.4 MG/ML
0.2 INJECTION, SOLUTION INTRAMUSCULAR; INTRAVENOUS; SUBCUTANEOUS
Status: DISCONTINUED | OUTPATIENT
Start: 2022-05-30 | End: 2022-06-07 | Stop reason: HOSPADM

## 2022-05-30 RX ORDER — PROCHLORPERAZINE MALEATE 5 MG
5 TABLET ORAL EVERY 6 HOURS PRN
Status: DISCONTINUED | OUTPATIENT
Start: 2022-05-30 | End: 2022-06-07 | Stop reason: HOSPADM

## 2022-05-30 RX ORDER — CALCIUM CARBONATE 500 MG/1
1000 TABLET, CHEWABLE ORAL 4 TIMES DAILY PRN
Status: DISCONTINUED | OUTPATIENT
Start: 2022-05-30 | End: 2022-06-07 | Stop reason: HOSPADM

## 2022-05-30 RX ORDER — FLUMAZENIL 0.1 MG/ML
0.2 INJECTION, SOLUTION INTRAVENOUS
Status: DISCONTINUED | OUTPATIENT
Start: 2022-05-30 | End: 2022-06-07 | Stop reason: HOSPADM

## 2022-05-30 RX ORDER — ONDANSETRON 2 MG/ML
4 INJECTION INTRAMUSCULAR; INTRAVENOUS EVERY 6 HOURS PRN
Status: DISCONTINUED | OUTPATIENT
Start: 2022-05-30 | End: 2022-06-07 | Stop reason: HOSPADM

## 2022-05-30 RX ORDER — ENOXAPARIN SODIUM 100 MG/ML
40 INJECTION SUBCUTANEOUS EVERY 24 HOURS
Status: DISCONTINUED | OUTPATIENT
Start: 2022-05-30 | End: 2022-06-07 | Stop reason: HOSPADM

## 2022-05-30 RX ORDER — IPRATROPIUM BROMIDE AND ALBUTEROL SULFATE 2.5; .5 MG/3ML; MG/3ML
3 SOLUTION RESPIRATORY (INHALATION) EVERY 4 HOURS PRN
Status: DISCONTINUED | OUTPATIENT
Start: 2022-05-30 | End: 2022-06-07 | Stop reason: HOSPADM

## 2022-05-30 RX ORDER — ACETAMINOPHEN 325 MG/1
650 TABLET ORAL EVERY 6 HOURS PRN
Status: DISCONTINUED | OUTPATIENT
Start: 2022-05-30 | End: 2022-06-07 | Stop reason: HOSPADM

## 2022-05-30 RX ORDER — ONDANSETRON 4 MG/1
4 TABLET, ORALLY DISINTEGRATING ORAL EVERY 6 HOURS PRN
Status: DISCONTINUED | OUTPATIENT
Start: 2022-05-30 | End: 2022-06-07 | Stop reason: HOSPADM

## 2022-05-30 RX ORDER — AMOXICILLIN 250 MG
1 CAPSULE ORAL 2 TIMES DAILY PRN
Status: DISCONTINUED | OUTPATIENT
Start: 2022-05-30 | End: 2022-06-07 | Stop reason: HOSPADM

## 2022-05-30 RX ADMIN — DEXTROSE AND SODIUM CHLORIDE: 5; 900 INJECTION, SOLUTION INTRAVENOUS at 08:07

## 2022-05-30 RX ADMIN — IPRATROPIUM BROMIDE AND ALBUTEROL SULFATE 3 ML: 2.5; .5 SOLUTION RESPIRATORY (INHALATION) at 20:59

## 2022-05-30 RX ADMIN — DEXTROSE AND SODIUM CHLORIDE: 5; 900 INJECTION, SOLUTION INTRAVENOUS at 16:17

## 2022-05-30 RX ADMIN — SODIUM CHLORIDE 1000 ML: 9 INJECTION, SOLUTION INTRAVENOUS at 02:42

## 2022-05-30 RX ADMIN — ENOXAPARIN SODIUM 40 MG: 100 INJECTION SUBCUTANEOUS at 08:07

## 2022-05-30 RX ADMIN — IPRATROPIUM BROMIDE AND ALBUTEROL SULFATE 3 ML: 2.5; .5 SOLUTION RESPIRATORY (INHALATION) at 16:42

## 2022-05-30 ASSESSMENT — ACTIVITIES OF DAILY LIVING (ADL)
ADLS_ACUITY_SCORE: 35
ADLS_ACUITY_SCORE: 37
ADLS_ACUITY_SCORE: 37
WALKING_OR_CLIMBING_STAIRS_DIFFICULTY: NO
ADLS_ACUITY_SCORE: 37
TOILETING_ISSUES: NO
DRESSING/BATHING_DIFFICULTY: NO
EQUIPMENT_CURRENTLY_USED_AT_HOME: CANE, STRAIGHT
VISION_MANAGEMENT: GLASSES
FALL_HISTORY_WITHIN_LAST_SIX_MONTHS: NO
ADLS_ACUITY_SCORE: 37
ADLS_ACUITY_SCORE: 37
ADLS_ACUITY_SCORE: 35
ADLS_ACUITY_SCORE: 35
ADLS_ACUITY_SCORE: 22
ADLS_ACUITY_SCORE: 37
CONCENTRATING,_REMEMBERING_OR_MAKING_DECISIONS_DIFFICULTY: NO
ADLS_ACUITY_SCORE: 22
DIFFICULTY_EATING/SWALLOWING: NO
CHANGE_IN_FUNCTIONAL_STATUS_SINCE_ONSET_OF_CURRENT_ILLNESS/INJURY: NO
DOING_ERRANDS_INDEPENDENTLY_DIFFICULTY: NO
WEAR_GLASSES_OR_BLIND: YES

## 2022-05-30 NOTE — ED NOTES
"[facility] ED Handoff Report    ED Chief Complaint: SOB    ED Diagnosis:  (R06.02) Shortness of breath  Comment:   Plan: oxygen and monitoring    (E86.0) Dehydration  Comment:   Plan: fluids    (R09.02) Hypoxemia  Comment:   Plan: placed on O2 nc/4lpm    (I95.89,  E86.1) Hypotension due to hypovolemia  Comment:   Plan: monitoring and fluids       PMH:    Past Medical History:   Diagnosis Date     Cancer (H)     prostate     Osteoarthritis         Code Status:  Full Code     Falls Risk: Yes Band: Applied    Current Living Situation/Residence: lives with their son or daughter     Elimination Status: Continent: Yes     Activity Level: 2 assist    Patients Preferred Language:  English     Needed: No    Vital Signs:  /62   Pulse 65   Temp 99.8  F (37.7  C) (Oral)   Resp 25   Ht 1.727 m (5' 8\")   Wt 86.2 kg (190 lb)   SpO2 96%   BMI 28.89 kg/m       Cardiac Rhythm: NSR    Pain Score: 1/10    Is the Patient Confused:  No    Last Food or Drink: 5/29/22 at     Focused Assessment:      Tests Performed: Done: Labs and Imaging    Treatments Provided:  Fluids, O2    Family Dynamics/Concerns: No    Family Updated On Visitor Policy: Yes    Plan of Care Communicated to Family: Yes    Who Was Updated about Plan of Care: self and son    Belongings Checklist Done and Signed by Patient: Yes    Medications sent with patient:     Covid: asymptomatic , negative    Additional Information:     RN: Jojo Santos RN   5/30/2022 6:12 AM     "

## 2022-05-30 NOTE — H&P
[facility]    History and Physical - Hospitalist Service       Date of Admission:  5/29/2022    Assessment & Plan      Venkatesh Alvarado is a 71 year old male admitted on 5/29/2022.   71-year-old male past medical history of prostate cancer with bony metastasis s/p radiation and currently on chemo presents with dizziness and generalized weakness.  At the ED he was found to be hypotensive and hypoxic.        Orthostatic hypotension  -Improved after fluids, likely poor oral intake given history, continue IV hydration.  Encourage oral hydration.  Fall precautions, check orthostatics after adequate hydration.    Hypoxia  -CT chest no PE, but showing mild mosaic attenuation seen possibly air trapping/small airway disease.  Also noted to have small groundglass opacities in the apices.  Pneumonitis not excluded.  But procalcitonin low, COVID-negative, respiratory viral panel pending.  -Incentive spirometry, DuoNebs, O2 as needed.  We will hold off on antibiotics for now and monitor closely.     Anion gap acidosis- positive urine ketones, likely from starvation ketosis with poor oral intake, check lactate.      Hyponatremia  -Serum sodium 130, hypovolemic hyponatremia.  Continue IV hydration.  Monitor serially      Chronic anemia  -Multifactorial cancer and chemoradiation.  No overt bleed or melena.  Monitor serially    Prostate cancer, metastatic   -Currently follows with urology Dr. Shabazz and DR Burden from oncology.  S/p radiation, currently on chemo.  Has upcoming appointments for chemo in 2 days needs to touch base with oncology if patient is still here at that time.         Diet: Combination Diet Regular Diet Adult    DVT Prophylaxis: Enoxaparin (Lovenox) SQ  Jaimes Catheter: Not present  Central Lines: None  Cardiac Monitoring: ACTIVE order. Indication: Hypotension, Hypoxia  Code Status: Full Code           # Hyponatremia: Na = 130 mmol/L (Ref range: 136 - 145 mmol/L) on admission, will  "monitor as appropriate    # Anion Gap Metabolic Acidosis: AG = 15 mmol/L (Ref range: 5 - 18 mmol/L) on admission, will monitor and treat as appropriate  # Hypoalbuminemia: Albumin = 2.9 g/dL (Ref range: 3.5 - 5.0 g/dL) on admission, will monitor as appropriate      # Overweight: Estimated body mass index is 28.89 kg/m  as calculated from the following:    Height as of this encounter: 1.727 m (5' 8\").    Weight as of this encounter: 86.2 kg (190 lb).      Disposition Plan   Expected Discharge:        The patient's care was discussed with the Patient.    Neftaly James MD  Hospitalist Service  [facility]  Securely message with the Vocera Web Console (learn more here)  Text page via [employer] Paging/Directory         ______________________________________________________________________    Chief Complaint   Generalized weakness and dizziness    History is obtained from the patient    History of Present Illness   Venkatesh Alvarado is a 71-year-old male past medical history of prostate cancer with bony metastasis s/p radiation and currently on chemo presents with dizziness and generalized weakness.  At the ED he was found to be hypotensive and hypoxic.  Improved after IV fluids and O2 via nasal cannula.  Reports that he has not been eating much for the last 3 weeks since his last chemo.  Has been feeling generally weak and dizzy mainly on standing up from sitting.  At the ED was found to be orthostatic which improved after fluids.  Denies any nausea or vomiting or diarrhea, no fever or chills.  Work-up with CT chest no PE, but showing mild mosaic attenuation seen possibly air trapping/small airway disease.  Also noted to have small groundglass opacities in the apices.  Pneumonitis not excluded.  But procalcitonin low, COVID-negative.  Started on IV fluids and admitted for further management.      Review of Systems    The 10 point Review of Systems is negative other than noted in the HPI or here. "     Past Medical History    I have reviewed this patient's medical history and updated it with pertinent information if needed.   Past Medical History:   Diagnosis Date     Cancer (H)     prostate     Osteoarthritis        Past Surgical History   I have reviewed this patient's surgical history and updated it with pertinent information if needed.  Past Surgical History:   Procedure Laterality Date     INGUINAL HERNIA REPAIR Right      IR CVC TUNNEL PLACEMENT > 5 YRS OF AGE  10/29/2021     IR CVC TUNNEL REMOVAL RIGHT  11/22/2021     PICC AND MIDLINE TEAM LINE INSERTION  9/5/2019            Social History   I have reviewed this patient's social history and updated it with pertinent information if needed.  Social History     Tobacco Use     Smoking status: Former Smoker     Packs/day: 0.00     Smokeless tobacco: Never Used     Tobacco comment: Quit 30 years ago.   Substance Use Topics     Alcohol use: Yes     Alcohol/week: 1.0 - 3.0 standard drink     Comment: Alcoholic Drinks/day: 3 times a week.     Drug use: No       Family History   I have reviewed this patient's family history and updated it with pertinent information if needed.  Family History   Problem Relation Age of Onset     Cancer Mother      CABG Father      Cerebrovascular Disease Father        Prior to Admission Medications   Prior to Admission Medications   Prescriptions Last Dose Informant Patient Reported? Taking?   Cholecalciferol 10 MCG (400 UNIT) CAPS   Yes No   Sig: Take 1 capsule by mouth every 24 hours   MEDICATION CANNOT BE REORDERED - PLEASE MANUALLY REORDER AND DISCONTINUE THE OLD ORDER   Yes No   Sig: [LEUPROLIDE, 6 MONTH, (ELIGARD, 6 MONTH,) 45 MG SYRINGE] Inject 45 mg under the skin every 6 (six) months.    abiraterone (ZYTIGA) 250 MG tablet   Yes No   Sig: Take 250 mg by mouth 4 times daily   amLODIPine (NORVASC) 2.5 MG tablet   No No   Sig: [AMLODIPINE (NORVASC) 2.5 MG TABLET] Take 1 tablet (2.5 mg total) by mouth daily.   amLODIPine  (NORVASC) 2.5 MG tablet   No No   Sig: Take 2 tablets (5 mg) by mouth daily   cholecalciferol, vitamin D3, 1,000 unit (25 mcg) tablet   Yes No   Sig: [CHOLECALCIFEROL, VITAMIN D3, 1,000 UNIT (25 MCG) TABLET] Take 2,000 Units by mouth daily.   denosumab (XGEVA) 120 mg/1.7 mL (70 mg/mL) Soln   Yes No   Sig: [DENOSUMAB (XGEVA) 120 MG/1.7 ML (70 MG/ML) SOLN] Inject 120 mg under the skin every 30 (thirty) days.    docusate sodium (COLACE) 100 MG capsule   Yes No   Sig: [DOCUSATE SODIUM (COLACE) 100 MG CAPSULE] Take 100 mg by mouth 2 (two) times a day as needed for constipation.   enzalutamide (XTANDI) 40 mg cap   Yes No   Sig: [ENZALUTAMIDE (XTANDI) 40 MG CAP] Take 160 mg by mouth daily.   oxyCODONE (ROXICODONE) 5 MG tablet   No No   Sig: Take 1 tablet (5 mg) by mouth every 8 hours as needed for severe pain   predniSONE (DELTASONE) 5 MG tablet   Yes No   Sig: Take 5 mg by mouth daily      Facility-Administered Medications: None     Allergies   No Known Allergies    Physical Exam   Vital Signs: Temp: 99.8  F (37.7  C) Temp src: Oral BP: 106/62 Pulse: 65   Resp: 25 SpO2: 96 %      Weight: 190 lbs 0 oz    General Appearance: Alert oriented, appears fatigued  Eyes: Pink conjunctiva  HEENT: PERRLA  Respiratory: Bilateral good air entry, no wheezing appreciated  Cardiovascular: S1-S2, no murmur  GI: Soft abdomen, nontender  Genitourinary: No CVA or SP tenderness  Skin: No skin rash  Musculoskeletal: No edema  Neurologic: AOx3      Data   Data reviewed today: I reviewed all medications, new labs and imaging results over the last 24 hours. I personally reviewed   Recent Labs   Lab 05/29/22  2131   WBC 10.8   HGB 10.9*   MCV 91      *   POTASSIUM 3.8   CHLORIDE 98   CO2 17*   BUN 14   CR 1.00   ANIONGAP 15   BECCA 8.0*   GLC 82   ALBUMIN 2.9*   PROTTOTAL 6.4   BILITOTAL 0.8   ALKPHOS 68   ALT 16   AST 34     Recent Results (from the past 24 hour(s))   CT Chest Pulmonary Embolism w Contrast    Narrative    EXAM: CT  CHEST PULMONARY EMBOLISM W CONTRAST  LOCATION: [facility]  DATE/TIME: 5/29/2022 11:27 PM    INDICATION: Short of breath  COMPARISON: 05/16/2022  TECHNIQUE: CT chest pulmonary angiogram during arterial phase injection of IV contrast. Multiplanar reformats and MIP reconstructions were performed. Dose reduction techniques were used.   CONTRAST: 100 mL Isovue 370    FINDINGS:  ANGIOGRAM CHEST: Pulmonary arteries are normal caliber and negative for pulmonary emboli. Thoracic aorta is negative for dissection. Mildly aneurysmal ascending thoracic aorta, 4.2 cm. No CT evidence of right heart strain.    LUNGS AND PLEURA: Mild mosaic attenuation is again seen. Mild groundglass opacities in the apices. Basilar atelectasis. No significant effusion.    MEDIASTINUM/AXILLAE: No adenopathy or significant pericardial effusion.    CORONARY ARTERY CALCIFICATION: Moderate.    UPPER ABDOMEN: Cholelithiasis. Stones in the cystic duct. No gallbladder distention. Wall thickening of the visualized duodenum not excluded. Slight adjacent stranding.    MUSCULOSKELETAL: Degenerative change osseous structures. Sclerotic osseous metastases.      Impression    IMPRESSION:  1.  No pulmonary embolism or thoracic aortic dissection.  2.  Mild mosaic attenuation is again seen which can be seen with air trapping/small airway disease. There are small groundglass opacities in the apices. Pneumonitis not excluded.  3.  Cholelithiasis and stones in the cystic duct. No gallbladder distention.  4.  Wall thickening of the visualized duodenum not excluded. Slight adjacent stranding.  5.  Sclerotic osseous metastases.  6.  Aneurysmal ascending thoracic aorta.

## 2022-05-30 NOTE — ED NOTES
"ED SIGNOUT  Date/Time:5/29/2022 11:25 PM    Patient signed out to me by my colleague, Dr. Arellano.  Please see their note for complete history and physical. Plan to follow up on CT Chest PE and disposition.     The creation of this record is based on the scribe s observations of the work being performed by Joni Rojas and the provider s statements to them. It was created on their behalf by María Elena Boogie, a trained medical scribe. This document has been checked and approved by the attending provider.      REMAINING ED WORKUP:    Vitals:  BP (!) 87/50   Pulse 95   Temp 99.8  F (37.7  C) (Oral)   Resp (!) 34   Ht 1.727 m (5' 8\")   Wt 86.2 kg (190 lb)   SpO2 (!) 85%   BMI 28.89 kg/m      Pertinent labs results reviewed   Results for orders placed or performed during the hospital encounter of 05/29/22   CT Chest Pulmonary Embolism w Contrast    Impression    IMPRESSION:  1.  No pulmonary embolism or thoracic aortic dissection.  2.  Mild mosaic attenuation is again seen which can be seen with air trapping/small airway disease. There are small groundglass opacities in the apices. Pneumonitis not excluded.  3.  Cholelithiasis and stones in the cystic duct. No gallbladder distention.  4.  Wall thickening of the visualized duodenum not excluded. Slight adjacent stranding.  5.  Sclerotic osseous metastases.  6.  Aneurysmal ascending thoracic aorta.   Basic metabolic panel   Result Value Ref Range    Sodium 130 (L) 136 - 145 mmol/L    Potassium 3.8 3.5 - 5.0 mmol/L    Chloride 98 98 - 107 mmol/L    Carbon Dioxide (CO2) 17 (L) 22 - 31 mmol/L    Anion Gap 15 5 - 18 mmol/L    Urea Nitrogen 14 8 - 28 mg/dL    Creatinine 1.00 0.70 - 1.30 mg/dL    Calcium 8.0 (L) 8.5 - 10.5 mg/dL    Glucose 82 70 - 125 mg/dL    GFR Estimate 81 >60 mL/min/1.73m2   Result Value Ref Range    Troponin I <0.01 0.00 - 0.29 ng/mL   Result Value Ref Range    Magnesium 2.1 1.8 - 2.6 mg/dL   B-Type Natriuretic Peptide ([facility] Only)   Result Value Ref " Range    BNP 34 0 - 67 pg/mL   Asymptomatic COVID-19 Virus (Coronavirus) by PCR Nasopharyngeal    Specimen: Nasopharyngeal; Swab   Result Value Ref Range    SARS CoV2 PCR Negative Negative   CBC with platelets and differential   Result Value Ref Range    WBC Count 10.8 4.0 - 11.0 10e3/uL    RBC Count 3.47 (L) 4.40 - 5.90 10e6/uL    Hemoglobin 10.9 (L) 13.3 - 17.7 g/dL    Hematocrit 31.7 (L) 40.0 - 53.0 %    MCV 91 78 - 100 fL    MCH 31.4 26.5 - 33.0 pg    MCHC 34.4 31.5 - 36.5 g/dL    RDW 14.6 10.0 - 15.0 %    Platelet Count 188 150 - 450 10e3/uL   Extra Blue Top Tube   Result Value Ref Range    Hold Specimen JIC    Extra Red Top Tube   Result Value Ref Range    Hold Specimen JIC    Manual Differential   Result Value Ref Range    % Neutrophils 77 %    % Lymphocytes 10 %    % Monocytes 2 %    % Eosinophils 0 %    % Basophils 0 %    % Metamyelocytes 2 %    % Myelocytes 9 %    Absolute Neutrophils 8.3 1.6 - 8.3 10e3/uL    Absolute Lymphocytes 1.1 0.8 - 5.3 10e3/uL    Absolute Monocytes 0.2 0.0 - 1.3 10e3/uL    Absolute Eosinophils 0.0 0.0 - 0.7 10e3/uL    Absolute Basophils 0.0 0.0 - 0.2 10e3/uL    Absolute Metamyelocytes 0.2 (H) <=0.0 10e3/uL    Absolute Myelocytes 1.0 (H) <=0.0 10e3/uL    RBC Morphology Confirmed RBC Indices     Platelet Assessment  Automated Count Confirmed. Platelet morphology is normal.     Automated Count Confirmed. Platelet morphology is normal.    Elliptocytes Slight (A) None Seen    Polychromasia Slight (A) None Seen    Teardrop Cells Slight (A) None Seen   Result Value Ref Range    Procalcitonin 0.09 0.00 - 0.49 ng/mL   Hepatic panel   Result Value Ref Range    Bilirubin Total 0.8 0.0 - 1.0 mg/dL    Bilirubin Direct 0.2 <=0.5 mg/dL    Protein Total 6.4 6.0 - 8.0 g/dL    Albumin 2.9 (L) 3.5 - 5.0 g/dL    Alkaline Phosphatase 68 45 - 120 U/L    AST 34 0 - 40 U/L    ALT 16 0 - 45 U/L   UA with Microscopic reflex to Culture    Specimen: Urine, Midstream   Result Value Ref Range    Color Urine  Light Yellow Colorless, Straw, Light Yellow, Yellow    Appearance Urine Clear Clear    Glucose Urine Negative Negative mg/dL    Bilirubin Urine Negative Negative    Ketones Urine 80  (A) Negative mg/dL    Specific Gravity Urine >1.050 (H) 1.001 - 1.030    Blood Urine Negative Negative    pH Urine 6.0 5.0 - 7.0    Protein Albumin Urine 20  (A) Negative mg/dL    Urobilinogen Urine <2.0 <2.0 mg/dL    Nitrite Urine Negative Negative    Leukocyte Esterase Urine Negative Negative    RBC Urine 0 <=2 /HPF    WBC Urine 0 <=5 /HPF       Pertinent imaging reviewed   Please see official radiology report.  CT Chest Pulmonary Embolism w Contrast   Final Result   IMPRESSION:   1.  No pulmonary embolism or thoracic aortic dissection.   2.  Mild mosaic attenuation is again seen which can be seen with air trapping/small airway disease. There are small groundglass opacities in the apices. Pneumonitis not excluded.   3.  Cholelithiasis and stones in the cystic duct. No gallbladder distention.   4.  Wall thickening of the visualized duodenum not excluded. Slight adjacent stranding.   5.  Sclerotic osseous metastases.   6.  Aneurysmal ascending thoracic aorta.           Interventions  Medications   0.9% sodium chloride BOLUS (0 mLs Intravenous Stopped 5/29/22 2340)   iopamidol (ISOVUE-370) solution 100 mL (100 mLs Intravenous Given 5/29/22 2331)   0.9% sodium chloride BOLUS (1,000 mLs Intravenous New Bag 5/30/22 0242)        ED Course/MDM:  11:26 PM Signout accepted from Dr. Arellano.  Prior records were reviewed.  Diagnostics from this visit are reviewed.  12:04 AM I introduced myself to the patient. I rechecked and updated patient on results as well.  1:35 AM I rechecked and updated patient.  2:06 AM I discussed case with Dr. James, hospitalist, who accepts the patient for admission.    Patient signed out pending CT scan for pulmonary embolism given his history of prostate cancer.  Patient is currently under treatment for metastatic  prostate cancer.  Labs so far show no significant anemia, or electrolyte imbalance.  Patient had negative COVID and negative troponin and BNP.  EKG had been otherwise unremarkable.  He does have a low blood pressure initially however responded to IV fluids and likely dehydrated based on his clinical appearance.  He did improve after fluids and initially was above 93% on room air however placed on oxygen which did seem to help with subjective shortness of breath.  Although after being on oxygen and removing it patient did drop his saturations especially when standing or any exertion.  He does complain about this being an ongoing issue for the last several weeks which he thought was just related to his chemotherapy.  CT scan returned with no pulmonary embolism but did show pneumonitis which could be viral or possibly related to the chemotherapy.  Patient not able to recall his chemotherapy agents but could be possibly pulmonary fibrosis or pneumonitis related to the chemotherapy adverse effect.  Patient however also with continued orthostatic hypotension likely related to hypovolemia and dehydration.  Further IV fluids were given and given the hypoxemia patient will be plan for admission.  No obvious pneumonia or infectious process however will swab patient for further respiratory panel and patient was discussed with hospitalist for admission.  No other acute findings for infection and will try to get urine to make sure he does not have a UTI possibly could just be related to depletion from chemotherapy and his cancer treatment.  ED Course as of 05/30/22 0428   Sun May 29, 2022   2038 Patient is a 71-year-old male who comes in today with shortness of breath.  He reports the symptoms been going on for about 3 weeks.  He also complains of some lightheadedness when he walks.  He last had chemo about 3 weeks ago and that is when this started and he thinks it could be related but its not clear.  He was initially  hypotensive and then his blood pressure went up.  He has no fevers or chills.  He does have a little bit of a cough.  He has no lower extremity pain or edema to suggest DVT.  Is not of a history of DVT or PE.  He does have a history of metastatic prostate cancer.  He had radiation to his spine and tolerated that really well.  He is not tolerating the chemo very well.  We will check some basic labs and plan to get CTA imaging of his chest.  We will give him some IV fluids since he was initially hypotensive and see if he responds to that.  I discussed all this with him and he is in agreement.   2334 I checked back in on the patient.  He is on a little bit of oxygen now.  It does not look like he actually dropped below 90%.  He has gotten some fluids and that might be helping.  We are waiting on his CT scan to come back and then can reevaluate and determine disposition.  Dr. Byrd can follow-up on that.           1. Shortness of breath    2. Dehydration    3. Hypoxemia    4. Hypotension due to hypovolemia          Dr. Joni Rojas  [facility] Emergency Department     Joni Rojas MD  05/30/22 4454

## 2022-05-30 NOTE — ED TRIAGE NOTES
Pt reports SOB, weakness, dizziness for the last three weeks that has gotten worse today. PT has chemo for prostate cancer every three weeks - due tomorrow. Pt is hypotensive and borderline hypoxia and tachycardia.

## 2022-05-30 NOTE — PROGRESS NOTES
"   05/30/22 0915   Quick Adds   Type of Visit Initial PT Evaluation   Living Environment   Current Living Arrangements house   Home Accessibility stairs to enter home;stairs within home   Number of Stairs, Main Entrance 4   Stair Railings, Main Entrance railings safe and in good condition   Number of Stairs, Within Home, Primary seven   Stair Railings, Within Home, Primary railings safe and in good condition   Transportation Anticipated car, drives self;family or friend will provide   Self-Care   Usual Activity Tolerance moderate   Current Activity Tolerance poor   Equipment Currently Used at Home cane, straight  (PRN, not daily)   Fall history within last six months no   General Information   Onset of Illness/Injury or Date of Surgery 05/29/22   Referring Physician Neftaly James MD   Patient/Family Therapy Goals Statement (PT) Return home   Pertinent History of Current Problem (include personal factors and/or comorbidities that impact the POC) Per pt's chart: \"71-year-old male past medical history of prostate cancer with bony metastasis s/p radiation and currently on chemo presents with dizziness and generalized weakness.  At the ED he was found to be hypotensive and hypoxic.\"   Existing Precautions/Restrictions fall   Cognition   Affect/Mental Status (Cognition) WFL   Orientation Status (Cognition) oriented x 4   Follows Commands (Cognition) WFL   Pain Assessment   Patient Currently in Pain No   Posture    Posture Forward head position;Protracted shoulders   Range of Motion (ROM)   Range of Motion ROM is WFL   Strength (Manual Muscle Testing)   Strength (Manual Muscle Testing) Deficits observed during functional mobility   Bed Mobility   Comment, (Bed Mobility) SBA rolling and boosting toward HOB with verbal cues   Transfers   Comment, (Transfers) SBA sit<>stand for vitals monitor, no use of assistive device, no signs of LOB   Gait/Stairs (Locomotion)   Comment, (Gait/Stairs) Unable to initiate gait beyond " standing and lateral stepping for return to bed due to instable vitals/hypotensive response.   Balance   Balance Comments SBA standing without assistive device   Clinical Impression   Criteria for Skilled Therapeutic Intervention Yes, treatment indicated   PT Diagnosis (PT) Impaired functional mobility   Influenced by the following impairments vitals response/orthostatic hypotension, strength, endurance, supplemental O2 dependence   Functional limitations due to impairments bed mobility, transfers, ambulation, stairs navigation   Clinical Presentation (PT Evaluation Complexity) Stable/Uncomplicated   Clinical Presentation Rationale Pt presents as medically diagnosed   Clinical Decision Making (Complexity) low complexity   Planned Therapy Interventions (PT) balance training;bed mobility training;gait training;stair training;strengthening;transfer training;progressive activity/exercise;patient/family education   Anticipated Equipment Needs at Discharge (PT) cane, straight   Risk & Benefits of therapy have been explained evaluation/treatment results reviewed;care plan/treatment goals reviewed;risks/benefits reviewed;patient   PT Discharge Planning   PT Discharge Recommendation (DC Rec) Transitional Care Facility;home with assist;home with home care physical therapy   PT Rationale for DC Rec Pt currently requires close monitoring for vitals response with mobility. He is unable to self assess need for supplemental O2 or return to sitting with orthostatic hypotension during change in position. Pt is agreeable to modification with education of vitals. Anticipate pt would be safe to discharge home after medically cleared. SBA transfers and bed mobility; unable to fully assess ambulation beyond standing transfers.   Total Evaluation Time   Total Evaluation Time (Minutes) 10   Physical Therapy Goals   PT Frequency Daily   PT Predicted Duration/Target Date for Goal Attainment 06/06/22   PT Goals Bed  Mobility;Transfers;Gait;Stairs   PT: Bed Mobility Independent;Supine to/from sit   PT: Transfers Sit to/from stand;Independent   PT: Gait Modified independent;Straight cane;100 feet   PT: Stairs Modified independent;7 stairs;Rail on right

## 2022-05-30 NOTE — PHARMACY-ADMISSION MEDICATION HISTORY
Pharmacy Note - Admission Medication History    Pertinent Provider Information: pt not very compliant with taking his prednisone as it makes him dizzy. Amlodipine on hold for over a month now per pt's provider as he was having low BP.     ______________________________________________________________________    Prior To Admission (PTA) med list completed and updated in EMR.       PTA Med List   Medication Sig Last Dose     denosumab (XGEVA) 120 mg/1.7 mL (70 mg/mL) Soln [DENOSUMAB (XGEVA) 120 MG/1.7 ML (70 MG/ML) SOLN] Inject 120 mg under the skin every 30 (thirty) days.  More than a month at Unknown time     docusate sodium (COLACE) 100 MG capsule [DOCUSATE SODIUM (COLACE) 100 MG CAPSULE] Take 100 mg by mouth 2 (two) times a day as needed for constipation.      leuprolide (ELIGARD) 45 MG kit Inject 45 mg Subcutaneous every 6 months More than a month at Unknown time     predniSONE (DELTASONE) 5 MG tablet Take 5 mg by mouth daily Past Week at Unknown time       Information source(s): Patient and CareEverywhere/[facility]  Method of interview communication: in-person    Summary of Changes to PTA Med List  New: none  Discontinued: Zytiga, amlodipine, vitamin D3, Xtandi, oxycodone  Changed: none    Patient was asked about OTC/herbal products specifically.  PTA med list reflects this.    In the past week, patient estimated taking medication this percent of the time:  50-90% due to adverse effect.    Allergies were reviewed, assessed, and updated with the patient.      Patient does not use any multi-dose medications prior to admission.    The information provided in this note is only as accurate as the sources available at the time of the update(s).    Thank you for the opportunity to participate in the care of this patient.    Kath Henry [facility]  5/30/2022 8:15 AM

## 2022-05-30 NOTE — PROGRESS NOTES
[facility]    Hospitalist Progress Note    Assessment & Plan   5/29/2022.   71-year-old male past medical history of prostate cancer with bony metastasis s/p radiation and currently on chemo presents with dizziness and generalized weakness.  At the ED he was found to be hypotensive and hypoxic:         Impression:   Principal Problem:    Prostate CA w Bone Mets -- S/P Rad, on Chemo   -- being treated by Dr. Burden with [facility] (and Dr. Shabazz)   -- get chemo q3 weeks, due this Thurs 6/2 (he has gotten 4 cycles of current chemo, and lizbeth 6 cycles of original chemo)   (?hold chemo for 2 weeks to allow him to recover?)      Hypotension     Dehydration   -- IV fluids, hypotension already resolved       Hyponatremia   -- improving with hydration with NS       Acute respiratory failure with hypoxia -- possible Viral URI   -- O2, Covid negative, Resp Viral panel negative    -- Duoneb qid and prn   -- could add steroids if not better in 1-2 days    Active Problems:    Severe malnutrition -- lost 18 lb in 6 week        Anemia due to chemotherapy      Plan:  Discussed with patient, he lives by himself and has been struggling at home.  Discussed with his son, Tim, and he sensed that he needs help ... and was going to offer to come live with him .. but hasn't offer yet.  Patient seems like the type to not ask for help -- but would accept it if offered. Will ask  to see (may benefit from meals on wheels)    DVT Prophylaxis: Enoxaparin (Lovenox) SQ  Code Status: Full Code -- discussed with him now, he is not interested in CPR or vent if cardiac or pulm arrest, will switch to DNR/DNI.     Disposition: Expected discharge in 1-2 days (once weans of O2)    Bassam Cordoba MD  Pager [phone]  Cell Phone [phone]  Text Page (7am to 6pm)    Interval History   Feels less lightheaded after IV fluids, but still weak. Weight has goe from 218 lb down to 200 in 4-6 weeks, no  appetite.  Tries to drink water -- but that sometimes tastes bad.     Physical Exam   Temp: 99.8  F (37.7  C) Temp src: Oral BP: 138/65 Pulse: 80   Resp: 25 SpO2: 91 % O2 Device: Nasal cannula Oxygen Delivery: 4 LPM  Vitals:    05/29/22 2003   Weight: 86.2 kg (190 lb)     Vital Signs with Ranges  Temp:  [99.8  F (37.7  C)] 99.8  F (37.7  C)  Pulse:  [64-99] 80  Resp:  [6-34] 25  BP: ()/(50-71) 138/65  FiO2 (%):  [5 %] 5 %  SpO2:  [85 %-97 %] 91 %  I/O last 3 completed shifts:  In: 2000 [IV Piggyback:2000]  Out: -     # Pain Assessment:  Current Pain Score 5/30/2022   Patient currently in pain? denies   Pain score (0-10) -   Venkatesh s pain level was assessed and he currently denies pain.        Constitutional: Awake, alert, cooperative, no apparent distress  Respiratory: Clear to auscultation bilaterally, no crackles or wheezing  Cardiovascular: Regular rate and rhythm, normal S1 and S2, and no murmur noted  GI: Normal bowel sounds, soft, non-distended, non-tender  Extrem: No calf tenderness, no ankle edema  Neuro: Ox3, no focal motor or sensory deficits    Medications     dextrose 5% and 0.9% NaCl 125 mL/hr at 05/30/22 1350       enoxaparin ANTICOAGULANT  40 mg Subcutaneous Q24H     sodium chloride (PF)  3 mL Intracatheter Q8H       Data   Recent Labs   Lab 05/29/22  2131   WBC 10.8   HGB 10.9*   MCV 91      *   POTASSIUM 3.8   CHLORIDE 98   CO2 17*   BUN 14   CR 1.00   ANIONGAP 15   BECCA 8.0*   GLC 82   ALBUMIN 2.9*   PROTTOTAL 6.4   BILITOTAL 0.8   ALKPHOS 68   ALT 16   AST 34       Imaging:   Recent Results (from the past 24 hour(s))   CT Chest Pulmonary Embolism w Contrast    Narrative    EXAM: CT CHEST PULMONARY EMBOLISM W CONTRAST  LOCATION: [facility]  DATE/TIME: 5/29/2022 11:27 PM    INDICATION: Short of breath  COMPARISON: 05/16/2022  TECHNIQUE: CT chest pulmonary angiogram during arterial phase injection of IV contrast. Multiplanar reformats and MIP reconstructions  were performed. Dose reduction techniques were used.   CONTRAST: 100 mL Isovue 370    FINDINGS:  ANGIOGRAM CHEST: Pulmonary arteries are normal caliber and negative for pulmonary emboli. Thoracic aorta is negative for dissection. Mildly aneurysmal ascending thoracic aorta, 4.2 cm. No CT evidence of right heart strain.    LUNGS AND PLEURA: Mild mosaic attenuation is again seen. Mild groundglass opacities in the apices. Basilar atelectasis. No significant effusion.    MEDIASTINUM/AXILLAE: No adenopathy or significant pericardial effusion.    CORONARY ARTERY CALCIFICATION: Moderate.    UPPER ABDOMEN: Cholelithiasis. Stones in the cystic duct. No gallbladder distention. Wall thickening of the visualized duodenum not excluded. Slight adjacent stranding.    MUSCULOSKELETAL: Degenerative change osseous structures. Sclerotic osseous metastases.      Impression    IMPRESSION:  1.  No pulmonary embolism or thoracic aortic dissection.  2.  Mild mosaic attenuation is again seen which can be seen with air trapping/small airway disease. There are small groundglass opacities in the apices. Pneumonitis not excluded.  3.  Cholelithiasis and stones in the cystic duct. No gallbladder distention.  4.  Wall thickening of the visualized duodenum not excluded. Slight adjacent stranding.  5.  Sclerotic osseous metastases.  6.  Aneurysmal ascending thoracic aorta.

## 2022-05-30 NOTE — ED PROVIDER NOTES
EMERGENCY DEPARTMENT ENCOUNTER      NAME: Venkatesh Alvarado  AGE: 71 year old male  YOB: 1950  MRN: [id number]  EVALUATION DATE & TIME: 5/29/2022  8:15 PM    PCP: Siva Rios    ED PROVIDER: Ivette Arellano M.D.      Chief Complaint   Patient presents with     Shortness of Breath     Generalized Weakness     Dizziness     FINAL IMPRESSION:  1. Shortness of breath      ED COURSE & MEDICAL DECISION MAKING:    Pertinent Labs & Imaging studies reviewed. (See chart for details)  ED Course as of 05/29/22 2334   Sun May 29, 2022   2038 Patient is a 71-year-old male who comes in today with shortness of breath.  He reports the symptoms been going on for about 3 weeks.  He also complains of some lightheadedness when he walks.  He last had chemo about 3 weeks ago and that is when this started and he thinks it could be related but its not clear.  He was initially hypotensive and then his blood pressure went up.  He has no fevers or chills.  He does have a little bit of a cough.  He has no lower extremity pain or edema to suggest DVT.  Is not of a history of DVT or PE.  He does have a history of metastatic prostate cancer.  He had radiation to his spine and tolerated that really well.  He is not tolerating the chemo very well.  We will check some basic labs and plan to get CTA imaging of his chest.  We will give him some IV fluids since he was initially hypotensive and see if he responds to that.  I discussed all this with him and he is in agreement.   2334 I checked back in on the patient.  He is on a little bit of oxygen now.  It does not look like he actually dropped below 90%.  He has gotten some fluids and that might be helping.  We are waiting on his CT scan to come back and then can reevaluate and determine disposition.  Dr. Byrd can follow-up on that.     8:34 PM I met with patient for initial interview and encounter. PPE worn includes N95 mask.    At the conclusion of the encounter I discussed  the  "results of all of the tests and the disposition with patient.   All questions were answered.  The patient acknowledged understanding and was involved in the decision making regarding the overall care plan.      MEDICATIONS GIVEN IN THE EMERGENCY:  Medications   0.9% sodium chloride BOLUS (1,000 mLs Intravenous New Bag 5/29/22 2134)   iopamidol (ISOVUE-370) solution 100 mL (100 mLs Intravenous Given 5/29/22 3847)       NEW PRESCRIPTIONS STARTED AT TODAY'S ER VISIT  New Prescriptions    No medications on file          =================================================================    HPI    Triage Note: Pt reports SOB, weakness, dizziness for the last three weeks that has gotten worse today. PT has chemo for prostate cancer every three weeks - due tomorrow. Pt is hypotensive and borderline hypoxia and tachycardia.     Patient information was obtained from: Patient     Use of : N/A         Venkatesh Alvarado is a 71 year old male who presents via walk-in for evaluation of shortness of breath, lightheadedness.    Patient is a stage IV prostate cancer patient who last had chemo 3 weeks ago. He states that he has continued to have persistent lightheadedness with walking and shortness of breath which is worse with laying flat. He states that he was told to expect these symptoms to improve following treatment, but they have remained unchanged. Patient also reports an ongoing cough and decreased appetite stating he \"hasn't eaten in 2 weeks.\" Patient is not on any blood thinners. Denies any leg swelling, chest pain, fever, chills, or any other complaints.      REVIEW OF SYSTEMS   Except as stated in the HPI all other systems reviewed and are negative.    PAST MEDICAL HISTORY:  Past Medical History:   Diagnosis Date     Cancer (H)     prostate     Osteoarthritis        PAST SURGICAL HISTORY:  Past Surgical History:   Procedure Laterality Date     INGUINAL HERNIA REPAIR Right      IR CVC TUNNEL PLACEMENT > 5 YRS OF " AGE  10/29/2021     IR CVC TUNNEL REMOVAL RIGHT  11/22/2021     PICC AND MIDLINE TEAM LINE INSERTION  9/5/2019            CURRENT MEDICATIONS:    No current facility-administered medications for this encounter.    Current Outpatient Medications:      abiraterone (ZYTIGA) 250 MG tablet, Take 250 mg by mouth 4 times daily, Disp: , Rfl:      amLODIPine (NORVASC) 2.5 MG tablet, Take 2 tablets (5 mg) by mouth daily, Disp: 180 tablet, Rfl: 2     amLODIPine (NORVASC) 2.5 MG tablet, [AMLODIPINE (NORVASC) 2.5 MG TABLET] Take 1 tablet (2.5 mg total) by mouth daily., Disp: 90 tablet, Rfl: 1     Cholecalciferol 10 MCG (400 UNIT) CAPS, Take 1 capsule by mouth every 24 hours, Disp: , Rfl:      cholecalciferol, vitamin D3, 1,000 unit (25 mcg) tablet, [CHOLECALCIFEROL, VITAMIN D3, 1,000 UNIT (25 MCG) TABLET] Take 2,000 Units by mouth daily., Disp: , Rfl:      denosumab (XGEVA) 120 mg/1.7 mL (70 mg/mL) Soln, [DENOSUMAB (XGEVA) 120 MG/1.7 ML (70 MG/ML) SOLN] Inject 120 mg under the skin every 30 (thirty) days. , Disp: , Rfl:      docusate sodium (COLACE) 100 MG capsule, [DOCUSATE SODIUM (COLACE) 100 MG CAPSULE] Take 100 mg by mouth 2 (two) times a day as needed for constipation., Disp: , Rfl:      enzalutamide (XTANDI) 40 mg cap, [ENZALUTAMIDE (XTANDI) 40 MG CAP] Take 160 mg by mouth daily., Disp: , Rfl:      MEDICATION CANNOT BE REORDERED - PLEASE MANUALLY REORDER AND DISCONTINUE THE OLD ORDER, [LEUPROLIDE, 6 MONTH, (ELIGARD, 6 MONTH,) 45 MG SYRINGE] Inject 45 mg under the skin every 6 (six) months. , Disp: , Rfl:      oxyCODONE (ROXICODONE) 5 MG tablet, Take 1 tablet (5 mg) by mouth every 8 hours as needed for severe pain, Disp: 10 tablet, Rfl: 0     predniSONE (DELTASONE) 5 MG tablet, Take 5 mg by mouth daily, Disp: , Rfl:     ALLERGIES:  No Known Allergies    FAMILY HISTORY:  Family History   Problem Relation Age of Onset     Cancer Mother      CABG Father      Cerebrovascular Disease Father        SOCIAL HISTORY:   Social  "History     Socioeconomic History     Marital status: Single   Tobacco Use     Smoking status: Former Smoker     Packs/day: 0.00     Smokeless tobacco: Never Used     Tobacco comment: Quit 30 years ago.   Substance and Sexual Activity     Alcohol use: Yes     Alcohol/week: 1.0 - 3.0 standard drink     Comment: Alcoholic Drinks/day: 3 times a week.     Drug use: No     Sexual activity: Not Currently     Partners: Female       PHYSICAL EXAM    VITAL SIGNS: BP 93/50   Pulse 89   Temp 99.8  F (37.7  C) (Oral)   Resp 26   Ht 1.727 m (5' 8\")   Wt 86.2 kg (190 lb)   SpO2 92%   BMI 28.89 kg/m     GENERAL: Awake, Alert, answering questions, No acute distress, Well nourished  HEENT: Normal cephalic, Atraumatic, bilateral external ears normal, No scleral icterus, mask in place  NECK: No obvious swelling or abnormality, No stridor  PULMONARY:Normal and symmetric breath sounds, No respiratory distress, Lungs clear to auscultation bilaterally. No wheezing  CARDIOVASCULAR: Regular rate and rhythm, Distal pulses present and normal.  ABDOMINAL: Soft, Nondistended, Nontender, No flank tenderness, No palpable masses  BACK: No bruising or tenderness.  EXTREMITIES: Moves all extremities spontaneously, warm, no edema, No major deformities  NEURO: No facial droop, normal motor function, Normal speech   PSYCH: Normal mood and affect  SKIN: No rashes on visualized skin, dry, warm     LAB:  All pertinent labs reviewed and interpreted.  Results for orders placed or performed during the hospital encounter of 05/29/22   Basic metabolic panel   Result Value Ref Range    Sodium 130 (L) 136 - 145 mmol/L    Potassium 3.8 3.5 - 5.0 mmol/L    Chloride 98 98 - 107 mmol/L    Carbon Dioxide (CO2) 17 (L) 22 - 31 mmol/L    Anion Gap 15 5 - 18 mmol/L    Urea Nitrogen 14 8 - 28 mg/dL    Creatinine 1.00 0.70 - 1.30 mg/dL    Calcium 8.0 (L) 8.5 - 10.5 mg/dL    Glucose 82 70 - 125 mg/dL    GFR Estimate 81 >60 mL/min/1.73m2   Result Value Ref Range    " Troponin I <0.01 0.00 - 0.29 ng/mL   Result Value Ref Range    Magnesium 2.1 1.8 - 2.6 mg/dL   B-Type Natriuretic Peptide ( [place] Only)   Result Value Ref Range    BNP 34 0 - 67 pg/mL   Asymptomatic COVID-19 Virus (Coronavirus) by PCR Nasopharyngeal    Specimen: Nasopharyngeal; Swab   Result Value Ref Range    SARS CoV2 PCR Negative Negative   CBC with platelets and differential   Result Value Ref Range    WBC Count 10.8 4.0 - 11.0 10e3/uL    RBC Count 3.47 (L) 4.40 - 5.90 10e6/uL    Hemoglobin 10.9 (L) 13.3 - 17.7 g/dL    Hematocrit 31.7 (L) 40.0 - 53.0 %    MCV 91 78 - 100 fL    MCH 31.4 26.5 - 33.0 pg    MCHC 34.4 31.5 - 36.5 g/dL    RDW 14.6 10.0 - 15.0 %    Platelet Count 188 150 - 450 10e3/uL   Extra Blue Top Tube   Result Value Ref Range    Hold Specimen JIC    Extra Red Top Tube   Result Value Ref Range    Hold Specimen JIC    Manual Differential   Result Value Ref Range    % Neutrophils 77 %    % Lymphocytes 10 %    % Monocytes 2 %    % Eosinophils 0 %    % Basophils 0 %    % Metamyelocytes 2 %    % Myelocytes 9 %    Absolute Neutrophils 8.3 1.6 - 8.3 10e3/uL    Absolute Lymphocytes 1.1 0.8 - 5.3 10e3/uL    Absolute Monocytes 0.2 0.0 - 1.3 10e3/uL    Absolute Eosinophils 0.0 0.0 - 0.7 10e3/uL    Absolute Basophils 0.0 0.0 - 0.2 10e3/uL    Absolute Metamyelocytes 0.2 (H) <=0.0 10e3/uL    Absolute Myelocytes 1.0 (H) <=0.0 10e3/uL    RBC Morphology Confirmed RBC Indices     Platelet Assessment  Automated Count Confirmed. Platelet morphology is normal.     Automated Count Confirmed. Platelet morphology is normal.    Elliptocytes Slight (A) None Seen    Polychromasia Slight (A) None Seen    Teardrop Cells Slight (A) None Seen       RADIOLOGY:  CT Chest Pulmonary Embolism w Contrast    (Results Pending)       CT Chest Pulmonary Embolism w Contrast    (Results Pending)       EKG:    Date and time: May 29, 2022 at 2057  Rate: 88 bpm  Rhythm: Normal sinus rhythm  CA interval: 164 ms  QRS interval: 82 ms  QT/QTc:  364/440 ms  ST changes or T wave changes: No acute ST or T wave abnormality  Change from prior ECG: No significant change from prior  I have independently reviewed and interpreted this EKG.     I, Jordy Ross, am serving as a scribe to document services personally performed by Dr. Arellano based on my observation and the provider's statements to me. I, Ivette Arellano MD attest that Jordy Ross is acting in a scribe capacity, has observed my performance of the services and has documented them in accordance with my direction.    Ivette Arellano M.D.  Emergency Medicine  [facility] EMERGENCY DEPARTMENT  [facility] [facility] [id number] [phone]  Dept: [phone]     Ivette Arellano MD  05/29/22 5692

## 2022-05-30 NOTE — ED NOTES
ORTHOSTATIC VITALS     Laying - HR- 72 BP-93/51    Sitting - HR- 84 BP- 69/48    Pt placed back into bed due to pt feeling dizzy and lightheaded.    Pt taken off O2 during orthostatic vitals and pt dropped to 88% RA and dropping. Pt placed back on 2L O2.    Dr Rojas notified

## 2022-05-31 ENCOUNTER — APPOINTMENT (OUTPATIENT)
Dept: OCCUPATIONAL THERAPY | Facility: HOSPITAL | Age: 72
DRG: 205 | End: 2022-05-31
Attending: STUDENT IN AN ORGANIZED HEALTH CARE EDUCATION/TRAINING PROGRAM
Payer: MEDICARE

## 2022-05-31 LAB
ANION GAP SERPL CALCULATED.3IONS-SCNC: 6 MMOL/L (ref 5–18)
BACTERIA UR CULT: NORMAL
BASOPHILS # BLD MANUAL: 0 10E3/UL (ref 0–0.2)
BASOPHILS NFR BLD MANUAL: 0 %
BUN SERPL-MCNC: 7 MG/DL (ref 8–28)
CALCIUM SERPL-MCNC: 6.9 MG/DL (ref 8.5–10.5)
CHLORIDE BLD-SCNC: 109 MMOL/L (ref 98–107)
CO2 SERPL-SCNC: 22 MMOL/L (ref 22–31)
CREAT SERPL-MCNC: 0.7 MG/DL (ref 0.7–1.3)
DACRYOCYTES BLD QL SMEAR: SLIGHT
ELLIPTOCYTES BLD QL SMEAR: SLIGHT
EOSINOPHIL # BLD MANUAL: 0 10E3/UL (ref 0–0.7)
EOSINOPHIL NFR BLD MANUAL: 0 %
ERYTHROCYTE [DISTWIDTH] IN BLOOD BY AUTOMATED COUNT: 15.2 % (ref 10–15)
GFR SERPL CREATININE-BSD FRML MDRD: >90 ML/MIN/1.73M2
GLUCOSE BLD-MCNC: 150 MG/DL (ref 70–125)
GLUCOSE BLDC GLUCOMTR-MCNC: 142 MG/DL (ref 70–99)
HCT VFR BLD AUTO: 25.9 % (ref 40–53)
HGB BLD-MCNC: 8.6 G/DL (ref 13.3–17.7)
LYMPHOCYTES # BLD MANUAL: 0.1 10E3/UL (ref 0.8–5.3)
LYMPHOCYTES NFR BLD MANUAL: 1 %
MCH RBC QN AUTO: 31.3 PG (ref 26.5–33)
MCHC RBC AUTO-ENTMCNC: 33.2 G/DL (ref 31.5–36.5)
MCV RBC AUTO: 94 FL (ref 78–100)
METAMYELOCYTES # BLD MANUAL: 0.7 10E3/UL
METAMYELOCYTES NFR BLD MANUAL: 6 %
MONOCYTES # BLD MANUAL: 0.5 10E3/UL (ref 0–1.3)
MONOCYTES NFR BLD MANUAL: 5 %
MYELOCYTES # BLD MANUAL: 0.2 10E3/UL
MYELOCYTES NFR BLD MANUAL: 2 %
NEUTROPHILS # BLD MANUAL: 9.4 10E3/UL (ref 1.6–8.3)
NEUTROPHILS NFR BLD MANUAL: 86 %
PLAT MORPH BLD: ABNORMAL
PLATELET # BLD AUTO: 146 10E3/UL (ref 150–450)
POLYCHROMASIA BLD QL SMEAR: SLIGHT
POTASSIUM BLD-SCNC: 3.4 MMOL/L (ref 3.5–5)
RBC # BLD AUTO: 2.75 10E6/UL (ref 4.4–5.9)
RBC MORPH BLD: ABNORMAL
SODIUM SERPL-SCNC: 137 MMOL/L (ref 136–145)
TOXIC GRANULES BLD QL SMEAR: PRESENT
WBC # BLD AUTO: 10.9 10E3/UL (ref 4–11)

## 2022-05-31 PROCEDURE — 99233 SBSQ HOSP IP/OBS HIGH 50: CPT | Performed by: INTERNAL MEDICINE

## 2022-05-31 PROCEDURE — 999N000157 HC STATISTIC RCP TIME EA 10 MIN

## 2022-05-31 PROCEDURE — 250N000009 HC RX 250: Performed by: INTERNAL MEDICINE

## 2022-05-31 PROCEDURE — 85007 BL SMEAR W/DIFF WBC COUNT: CPT | Performed by: STUDENT IN AN ORGANIZED HEALTH CARE EDUCATION/TRAINING PROGRAM

## 2022-05-31 PROCEDURE — 250N000011 HC RX IP 250 OP 636: Performed by: STUDENT IN AN ORGANIZED HEALTH CARE EDUCATION/TRAINING PROGRAM

## 2022-05-31 PROCEDURE — 85027 COMPLETE CBC AUTOMATED: CPT | Performed by: STUDENT IN AN ORGANIZED HEALTH CARE EDUCATION/TRAINING PROGRAM

## 2022-05-31 PROCEDURE — 97166 OT EVAL MOD COMPLEX 45 MIN: CPT | Mod: GO

## 2022-05-31 PROCEDURE — 250N000011 HC RX IP 250 OP 636: Performed by: INTERNAL MEDICINE

## 2022-05-31 PROCEDURE — 258N000003 HC RX IP 258 OP 636: Performed by: STUDENT IN AN ORGANIZED HEALTH CARE EDUCATION/TRAINING PROGRAM

## 2022-05-31 PROCEDURE — 97535 SELF CARE MNGMENT TRAINING: CPT | Mod: GO

## 2022-05-31 PROCEDURE — 36415 COLL VENOUS BLD VENIPUNCTURE: CPT | Performed by: STUDENT IN AN ORGANIZED HEALTH CARE EDUCATION/TRAINING PROGRAM

## 2022-05-31 PROCEDURE — 97530 THERAPEUTIC ACTIVITIES: CPT | Mod: GO

## 2022-05-31 PROCEDURE — 250N000013 HC RX MED GY IP 250 OP 250 PS 637: Performed by: INTERNAL MEDICINE

## 2022-05-31 PROCEDURE — 120N000001 HC R&B MED SURG/OB

## 2022-05-31 PROCEDURE — 80048 BASIC METABOLIC PNL TOTAL CA: CPT | Performed by: STUDENT IN AN ORGANIZED HEALTH CARE EDUCATION/TRAINING PROGRAM

## 2022-05-31 PROCEDURE — 94640 AIRWAY INHALATION TREATMENT: CPT | Mod: 76

## 2022-05-31 PROCEDURE — 94640 AIRWAY INHALATION TREATMENT: CPT

## 2022-05-31 RX ORDER — CEFTRIAXONE 1 G/1
1 INJECTION, POWDER, FOR SOLUTION INTRAMUSCULAR; INTRAVENOUS EVERY 24 HOURS
Status: DISCONTINUED | OUTPATIENT
Start: 2022-05-31 | End: 2022-06-03

## 2022-05-31 RX ORDER — POTASSIUM CHLORIDE 1.5 G/1.58G
40 POWDER, FOR SOLUTION ORAL ONCE
Status: COMPLETED | OUTPATIENT
Start: 2022-05-31 | End: 2022-05-31

## 2022-05-31 RX ADMIN — POTASSIUM CHLORIDE 40 MEQ: 1.5 POWDER, FOR SOLUTION ORAL at 09:39

## 2022-05-31 RX ADMIN — IPRATROPIUM BROMIDE AND ALBUTEROL SULFATE 3 ML: 2.5; .5 SOLUTION RESPIRATORY (INHALATION) at 08:58

## 2022-05-31 RX ADMIN — IPRATROPIUM BROMIDE AND ALBUTEROL SULFATE 3 ML: 2.5; .5 SOLUTION RESPIRATORY (INHALATION) at 16:19

## 2022-05-31 RX ADMIN — CEFTRIAXONE SODIUM 1 G: 1 INJECTION, POWDER, FOR SOLUTION INTRAMUSCULAR; INTRAVENOUS at 09:42

## 2022-05-31 RX ADMIN — IPRATROPIUM BROMIDE AND ALBUTEROL SULFATE 3 ML: 2.5; .5 SOLUTION RESPIRATORY (INHALATION) at 19:54

## 2022-05-31 RX ADMIN — DEXTROSE AND SODIUM CHLORIDE: 5; 900 INJECTION, SOLUTION INTRAVENOUS at 00:57

## 2022-05-31 RX ADMIN — IPRATROPIUM BROMIDE AND ALBUTEROL SULFATE 3 ML: 2.5; .5 SOLUTION RESPIRATORY (INHALATION) at 11:32

## 2022-05-31 RX ADMIN — ENOXAPARIN SODIUM 40 MG: 100 INJECTION SUBCUTANEOUS at 07:58

## 2022-05-31 ASSESSMENT — ACTIVITIES OF DAILY LIVING (ADL)
ADLS_ACUITY_SCORE: 22

## 2022-05-31 NOTE — PLAN OF CARE
Goal Outcome Evaluation:    No pain was reported. Vitals stable. Alert and oriented. Walked to the bathroom x1 and was steady.  Patient reports to be resting comfortably.     Problem: Plan of Care - These are the overarching goals to be used throughout the patient stay.    Goal: Optimal Comfort and Wellbeing  Outcome: Ongoing, Progressing     Problem: Risk for Delirium  Goal: Improved Sleep  Outcome: Ongoing, Progressing

## 2022-05-31 NOTE — PROGRESS NOTES
[facility]    Hospitalist Progress Note    Assessment & Plan   5/29/2022.   71-year-old male past medical history of prostate cancer with bony metastasis s/p radiation and currently on chemo presents with dizziness and generalized weakness.  At the ED he was found to be hypotensive and hypoxic:         Impression:   Principal Problem:    Prostate CA w Bone Mets -- S/P Rad, on Chemo   -- being treated by Dr. Burden with [facility] (and Dr. Shabazz)   -- get chemo q3 weeks, due this Thurs 6/2 (he has gotten 4 cycles of current chemo, and lizbeth 6 cycles of original chemo)   (?hold chemo for 2 weeks to allow him to recover?)      Hypotension     Dehydration   -- IV fluids, hypotension already resolved       Hyponatremia   -- improving with hydration with NS     Suspected pneumonia   Acute respiratory failure with hypoxia   CT showed ground glass opacity in the right upper lung. He is immunosuppressed    -- O2, Covid negative, Resp Viral panel negative    -- Duoneb qid and prn   --start IV ceftriaxone    --follow up sputum culture    -- could add steroids if not better in 1-2 days    Active Problems:  Severe malnutrition -- lost 18 lb in 6 week  Follow up consult to dietician        Anemia due to chemotherapy   --Hgb dropped to 8.6 from 10.9    --Hemodynamically stable    --Monitor hgb and transfuse if hgb < 7    --Out patient follow up at the hematology/oncology clinic     DVT Prophylaxis: Enoxaparin (Lovenox) SQ  Code Status: No CPR- Do NOT Intubate   Disposition: Expected discharge in 1-2 days (once weans of O2)    Kim Vela MD     Interval History   He complains of cough productive yellowish sputum. He is requiring 5L of O2 this morning. He endorsed loss of appetite and weight loss    Physical Exam   Temp: 97.6  F (36.4  C) Temp src: Oral BP: 97/58 Pulse: 71   Resp: 20 SpO2: 92 % O2 Device: Nasal cannula Oxygen Delivery: 5 LPM  Vitals:    05/29/22 2003 05/30/22 1909   Weight:  86.2 kg (190 lb) 89.7 kg (197 lb 12.8 oz)     Vital Signs with Ranges  Temp:  [97.6  F (36.4  C)-100  F (37.8  C)] 97.6  F (36.4  C)  Pulse:  [] 71  Resp:  [9-41] 20  BP: ()/(56-88) 97/58  FiO2 (%):  [5 %] 5 %  SpO2:  [88 %-97 %] 92 %  I/O last 3 completed shifts:  In: 125 [I.V.:125]  Out: 275 [Urine:275]    # Pain Assessment:  Current Pain Score 5/31/2022   Patient currently in pain? denies   Pain score (0-10) -   Venkatesh spence pain level was assessed and he currently denies pain.        Constitutional: Awake, alert, cooperative, no apparent distress  Respiratory: diminished air entry bilaterally, no crackles or wheezing  Cardiovascular: Regular rate and rhythm, normal S1 and S2, and no murmur noted  GI: Normal bowel sounds, soft, non-distended, non-tender  Extrem: No calf tenderness, no ankle edema  Neuro: Ox3, no focal motor or sensory deficits    Medications       cefTRIAXone  1 g Intravenous Q24H     enoxaparin ANTICOAGULANT  40 mg Subcutaneous Q24H     ipratropium - albuterol 0.5 mg/2.5 mg/3 mL  3 mL Nebulization 4x daily     sodium chloride (PF)  3 mL Intracatheter Q8H       Data   Recent Labs   Lab 05/31/22  0702 05/29/22  2131   WBC 10.9 10.8   HGB 8.6* 10.9*   MCV 94 91   * 188    130*   POTASSIUM 3.4* 3.8   CHLORIDE 109* 98   CO2 22 17*   BUN 7* 14   CR 0.70 1.00   ANIONGAP 6 15   BECCA 6.9* 8.0*   * 82   ALBUMIN  --  2.9*   PROTTOTAL  --  6.4   BILITOTAL  --  0.8   ALKPHOS  --  68   ALT  --  16   AST  --  34       Imaging:   No results found for this or any previous visit (from the past 24 hour(s)).

## 2022-05-31 NOTE — CONSULTS
Care Management Initial Consult    General Information  Assessment completed with: Patient, Family, pt and brother Atif  Type of CM/SW Visit: Initial Assessment    Primary Care Provider verified and updated as needed: Yes   Readmission within the last 30 days: no previous admission in last 30 days   Return Category: Progression of disease  Reason for Consult: discharge planning  Advance Care Planning: Advance Care Planning Reviewed: verified with patient, no concerns identified     General Information Comments: lives alone and no svcs, family to transport, here w/brother Atif.    Communication Assessment  Patient's communication style: spoken language (English or Bilingual)    Hearing Difficulty or Deaf: no   Wear Glasses or Blind: yes    Cognitive  Cognitive/Neuro/Behavioral: WDL                      Living Environment:   People in home: alone     Current living Arrangements: condominium      Able to return to prior arrangements:         Family/Social Support:  Care provided by: self  Provides care for: no one  Marital Status: Single  Sibling(s)          Description of Support System: Supportive, Involved    Support Assessment: Adequate family and caregiver support, Adequate social supports    Current Resources:   Patient receiving home care services: No     Community Resources: DME  Equipment currently used at home: cane, straight  Supplies currently used at home: None    Employment/Financial:  Employment Status:          Financial Concerns: No concerns identified   Referral to Financial Worker: No       Lifestyle & Psychosocial Needs:  Social Determinants of Health     Tobacco Use: Medium Risk     Smoking Tobacco Use: Former Smoker     Smokeless Tobacco Use: Never Used   Alcohol Use: Not on file   Financial Resource Strain: Not on file   Food Insecurity: Not on file   Transportation Needs: Not on file   Physical Activity: Not on file   Stress: Not on file   Social Connections: Not on file   Intimate Partner  Violence: Not on file   Depression: Not at risk     PHQ-2 Score: 1   Housing Stability: Not on file       Functional Status:  Prior to admission patient needed assistance:              Mental Health Status:          Chemical Dependency Status:                Values/Beliefs:  Spiritual, Cultural Beliefs, Denominational Practices, Values that affect care:                 Additional Information:  Assessed, lives alone and no svcs, here w/brother Atif, family to transport and no needs identified.      Maryam Hicks RN

## 2022-05-31 NOTE — CONSULTS
Consultation    Venkatesh Alvarado MRN# [id number]   YOB: 1950 Age: 71 year old   Date of Admission: 5/29/2022  Requesting physician:  Dr. Bassam Wall  Reason for consult:  Weakness with poor appetite while undergoing chemotherapy for metastatic prostate cancer           Assessment and Plan:     Venkatesh is a 71 year old male with mHRPC with metastatic disease to the bones and lymph nodes seen on PSMA PET scan. The patient has been treated with docetaxel as well as abiraterone plus prednisone in the past. However PET scan and PSA showed progression. As such he initiated treatment with Cabazitaxel + Prednisone on 3/14/22.  - Last received cycle 4 of Cabazitaxel 25 mg/m  on 5/16/22 with Neulasta for neutropenia prevention. Next cycle 5 due 6/6/22. Hold Cabazitaxel.  - He also receives the following IV premedications Solu-Medrol 125 mg IV in addition to Pepcid and Benadryl to prevent an infusion reaction, which occured following his first cycle.  - He will continue prednisone 10 mg daily.  - Receives ADT every 6 months and Melissa day with Dr. Shabazz at [facility].  - Scheduled for a PET on 6/2/22; likely cancel this until patient is outpatient. If there is disease progression, he has 4 treatment options: apalutamide, daralutamide, rechallenge with abiraterone, and Lutetium 177 PSMA therapy. I will discuss these options with the patient tomorrow, 6/1/22.  - Patient wishes to still prolong life but wants to balance it with quality of life; he feels that he no longer wants chemotherapy at this time. He will benefit from a goals of care discussion; consult palliative care. I have discussed with him that before he makes a definitive decision, we should get another PET scan to determine treatment response.      Acute Respiratory Failure with Hypoxia, secondary to Pneumonia vs Pneumonitis  - Continue antibiotics. Viral panel negative.  - If pneumonitis then could be secondary cabazitaxel, but low  likelihood.  - HOLD Cabazitaxel until lungs recover and until performance status improves      Anemia secondary to chemotherapy, expected  - Last received cycle 4 on 5/16/22; dk is usually ~8-14 days.  - This is likely bone marrow delay since he is also malnourished. Consult dietitian.  - Transfuse for hgb <7.0      Case discussed with Dr. Burden today. We will continue to follow.      Adelina Fall PA-C  [facility]  [phone] (office); [phone] (cell)              Chief Complaint:   Shortness of Breath, Generalized Weakness, and Dizziness           History of Present Illness:   Mr. Venkatesh Alvarado is  71-year-old male with metastatic prostate adenocarcinoma.    ONCOLOGIC HISTORY:  In September 2019 patient has presented with acute lower back pain and sciatica. Patient underwent MRI of LS spine which revealed 2.1 cm marrow replacing enhancing lesion in T11 vertebral body. There are multiple degenerative changes.    Patient's PSA was 53 ng/mL.    9/3/2019 CT of chest abdomen pelvis revealed small periaortic lymph nodes and aortocaval lymph nodes. T11 vertebral body sclerotic lesion.  MRI of pelvis revealed 7 mm lesion in left acetabulum and SI joint.  10/16/2019 MRI of prostate revealed enlarged prostate with retroperitoneal retrocaval aortocaval lymphadenopathy. There was also left posteromedial peripheral zone focal area highly representing tumor.    10/22/2019 Dr. Preet Shabazz performed prostate biopsy which revealed prostate adenocarcinoma 4+5 = 9.    12/10/2019 He started Eligard with Dr. Shabazz.     1/6/2020 he started docetaxel 75 mg/m2 IV every 21 days x 6 cycles, completed 4/2020.     12/17/20 Restarted Eligard;    1/2021 Enzalutamide    9/2021 Switched to Abiraterone    9/27/21 CT abd/pelvis showed two bony sclerotic foci, slightly increased in size. New conspicuous anterior-lateral R pelvic wall subcutaneous nodule, indeterminate. No change in RP LNs. Bone scan shows continued response in  "osseous metastases.     21 Completed Provege    21 stopped Xgeva due to hypocalcemia     - 22 IMRT 5 Fractions in 2000cGy to the T11 bone metastases.    On 2022  PSMA PET scan revealed metastatic sclerotic osseous lesion with associated PSMA uptake in the axial and proximal appendicular skeleton. 2. Focal PSMA uptake to the posterior midline basal/mid prostate gland, likely representing known tumor. Additional focal PSMA uptake the left seminal vesicle, suspicious for extracapsular tumor extension.    On 3/14/2022 he has started cabazitaxel 25 mg meter squared IV every 21 days.      INTERVAL HISTORY:  Patient has been having shortness of breath. He had a CT chest Angiogram as outpatient was negative for PE but mosaic lung attenuation suggesting pulmonary air trapping which can be seen with bronchiolitis. After he received chemotherapy, he felt that his shortness of breath continued to worsened and as such he decided to come to the ED. CT chest angiogram here was again negative for PE. Again it showed mild mosaic attenuation which can be seen with air trapping/small airway disease. There are small groundglass opacities in the apices. Pneumonitis not excluded.    Today, he expresses that he does not wish to be in the hospital. He wishes to have more quality of life. He states that if he only had 1 month left to live, he would prefer to be comfortable and have energy. He inquires whether he still had more treatment options. He prefers to trial a treatment that is less intense but would also prolong life.            Physical Exam:   Vitals were reviewed  Blood pressure 112/69, pulse 107, temperature 98.7  F (37.1  C), temperature source Oral, resp. rate 18, height 1.768 m (5' 9.6\"), weight 89.7 kg (197 lb 12.8 oz), SpO2 96 %.  Temperatures:  Current - Temp: 98.7  F (37.1  C); Max - Temp  Av.3  F (36.8  C)  Min: 97.6  F (36.4  C)  Max: 99.1  F (37.3  C)  Respiration range: Resp  Av.3  " Min: 16  Max: 20  Pulse range: Pulse  Av.4  Min: 67  Max: 107  Blood pressure range: Systolic (24hrs), Av , Min:97 , Max:118   ; Diastolic (24hrs), Av, Min:56, Max:72    Pulse oximetry range: SpO2  Av.6 %  Min: 92 %  Max: 97 %    Intake/Output Summary (Last 24 hours) at 2022 1841  Last data filed at 2022 1110  Gross per 24 hour   Intake 1449 ml   Output --   Net 1449 ml       GENERAL: No acute distress.  SKIN: No rashes or jaundice.  HEENT: Normocephalic, atraumatic. Eyes anicteric. Oropharynx is clear.  LYMPH: No palpable lymphadenopathy in the cervical, supraclavicular, axillary, or inguinal regions.  HEART: Regular rate and rhythm with no murmurs.  LUNGS: Clear bilaterally.  ABDOMEN: Soft, nontender, nondistended with no palpable hepatosplenomegaly.  EXTREMITIES: No clubbing, cyanosis, or edema.  MUSCULOSKELETAL: No pain to percussion over entire spine.  MENTAL: Alert and oriented to person, place, and time.  NEURO: Cranial nerves II through XII grossly intact with no focal motor or sensory deficits.              Past Medical History:   I have reviewed this patient's past medical history  Past Medical History:   Diagnosis Date     Cancer (H)     prostate     Osteoarthritis              Past Surgical History:   I have reviewed this patient's past surgical history  Past Surgical History:   Procedure Laterality Date     INGUINAL HERNIA REPAIR Right      IR CVC TUNNEL PLACEMENT > 5 YRS OF AGE  10/29/2021     IR CVC TUNNEL REMOVAL RIGHT  2021     PICC AND MIDLINE TEAM LINE INSERTION  2019                    Social History:   I have reviewed this patient's social history  Social History     Tobacco Use     Smoking status: Former Smoker     Packs/day: 0.00     Smokeless tobacco: Never Used     Tobacco comment: Quit 30 years ago.   Substance Use Topics     Alcohol use: Yes     Alcohol/week: 1.0 - 3.0 standard drink     Comment: Alcoholic Drinks/day: 3 times a week.              Family History:   I have reviewed this patient's family history  Family History   Problem Relation Age of Onset     Cancer Mother      CABG Father      Cerebrovascular Disease Father              Allergies:   No Known Allergies          Medications:   I have reviewed this patient's current medications  Medications Prior to Admission   Medication Sig Dispense Refill Last Dose     denosumab (XGEVA) 120 mg/1.7 mL (70 mg/mL) Soln [DENOSUMAB (XGEVA) 120 MG/1.7 ML (70 MG/ML) SOLN] Inject 120 mg under the skin every 30 (thirty) days.    More than a month at Unknown time     docusate sodium (COLACE) 100 MG capsule [DOCUSATE SODIUM (COLACE) 100 MG CAPSULE] Take 100 mg by mouth 2 (two) times a day as needed for constipation.        leuprolide (ELIGARD) 45 MG kit Inject 45 mg Subcutaneous every 6 months   More than a month at Unknown time     predniSONE (DELTASONE) 5 MG tablet Take 5 mg by mouth daily   Past Week at Unknown time     amLODIPine (NORVASC) 2.5 MG tablet Take 2 tablets (5 mg) by mouth daily (Patient not taking: Reported on 5/30/2022) 180 tablet 2 Not Taking at Unknown time     Current Facility-Administered Medications Ordered in Epic   Medication Dose Route Frequency Last Rate Last Admin     acetaminophen (TYLENOL) tablet 650 mg  650 mg Oral Q6H PRN         calcium carbonate (TUMS) chewable tablet 1,000 mg  1,000 mg Oral 4x Daily PRN         cefTRIAXone (ROCEPHIN) 1 g vial to attach to  mL bag for ADULTS or NS 50 mL bag for PEDS  1 g Intravenous Q24H   1 g at 05/31/22 0942     enoxaparin ANTICOAGULANT (LOVENOX) injection 40 mg  40 mg Subcutaneous Q24H   40 mg at 05/31/22 0758     flumazenil (ROMAZICON) injection 0.2 mg  0.2 mg Intravenous q1 min prn         ipratropium - albuterol 0.5 mg/2.5 mg/3 mL (DUONEB) neb solution 3 mL  3 mL Nebulization Q4H PRN         ipratropium - albuterol 0.5 mg/2.5 mg/3 mL (DUONEB) neb solution 3 mL  3 mL Nebulization 4x daily   3 mL at 05/31/22 1619     lidocaine (LMX4) cream    Topical Q1H PRN         lidocaine 1 % 0.1-1 mL  0.1-1 mL Other Q1H PRN         lidocaine 1 % 1-30 mL  1-30 mL Intradermal Once PRN         melatonin tablet 1 mg  1 mg Oral At Bedtime PRN         midazolam (VERSED) injection 0.5-2 mg  0.5-2 mg Intravenous Q4 Min PRN         naloxone (NARCAN) injection 0.2 mg  0.2 mg Intravenous Q2 Min PRN        Or     naloxone (NARCAN) injection 0.4 mg  0.4 mg Intravenous Q2 Min PRN        Or     naloxone (NARCAN) injection 0.2 mg  0.2 mg Intramuscular Q2 Min PRN        Or     naloxone (NARCAN) injection 0.4 mg  0.4 mg Intramuscular Q2 Min PRN         ondansetron (ZOFRAN ODT) ODT tab 4 mg  4 mg Oral Q6H PRN        Or     ondansetron (ZOFRAN) injection 4 mg  4 mg Intravenous Q6H PRN         prochlorperazine (COMPAZINE) injection 5 mg  5 mg Intravenous Q6H PRN        Or     prochlorperazine (COMPAZINE) tablet 5 mg  5 mg Oral Q6H PRN        Or     prochlorperazine (COMPAZINE) suppository 12.5 mg  12.5 mg Rectal Q12H PRN         senna-docusate (SENOKOT-S/PERICOLACE) 8.6-50 MG per tablet 1 tablet  1 tablet Oral BID PRN        Or     senna-docusate (SENOKOT-S/PERICOLACE) 8.6-50 MG per tablet 2 tablet  2 tablet Oral BID PRN         sodium chloride (PF) 0.9% PF flush 3 mL  3 mL Intracatheter Q8H         sodium chloride (PF) 0.9% PF flush 3 mL  3 mL Intracatheter q1 min prn         No current [facility]-ordered outpatient medications on file.             Review of Systems:     The 10 point Review of Systems is negative other than noted in the HPI.            Data:   Data   Results for orders placed or performed during the hospital encounter of 05/29/22 (from the past 24 hour(s))   CBC with platelets differential    Narrative    The following orders were created for panel order CBC with platelets differential.  Procedure                               Abnormality         Status                     ---------                               -----------         ------                     CBC with  platelets and d...[[id number]]  Abnormal            Final result               Manual Differential[[id number]]          Abnormal            Final result                 Please view results for these tests on the individual orders.   Basic metabolic panel   Result Value Ref Range    Sodium 137 136 - 145 mmol/L    Potassium 3.4 (L) 3.5 - 5.0 mmol/L    Chloride 109 (H) 98 - 107 mmol/L    Carbon Dioxide (CO2) 22 22 - 31 mmol/L    Anion Gap 6 5 - 18 mmol/L    Urea Nitrogen 7 (L) 8 - 28 mg/dL    Creatinine 0.70 0.70 - 1.30 mg/dL    Calcium 6.9 (L) 8.5 - 10.5 mg/dL    Glucose 150 (H) 70 - 125 mg/dL    GFR Estimate >90 >60 mL/min/1.73m2   CBC with platelets and differential   Result Value Ref Range    WBC Count 10.9 4.0 - 11.0 10e3/uL    RBC Count 2.75 (L) 4.40 - 5.90 10e6/uL    Hemoglobin 8.6 (L) 13.3 - 17.7 g/dL    Hematocrit 25.9 (L) 40.0 - 53.0 %    MCV 94 78 - 100 fL    MCH 31.3 26.5 - 33.0 pg    MCHC 33.2 31.5 - 36.5 g/dL    RDW 15.2 (H) 10.0 - 15.0 %    Platelet Count 146 (L) 150 - 450 10e3/uL   Manual Differential   Result Value Ref Range    % Neutrophils 86 %    % Lymphocytes 1 %    % Monocytes 5 %    % Eosinophils 0 %    % Basophils 0 %    % Metamyelocytes 6 %    % Myelocytes 2 %    Absolute Neutrophils 9.4 (H) 1.6 - 8.3 10e3/uL    Absolute Lymphocytes 0.1 (L) 0.8 - 5.3 10e3/uL    Absolute Monocytes 0.5 0.0 - 1.3 10e3/uL    Absolute Eosinophils 0.0 0.0 - 0.7 10e3/uL    Absolute Basophils 0.0 0.0 - 0.2 10e3/uL    Absolute Metamyelocytes 0.7 (H) <=0.0 10e3/uL    Absolute Myelocytes 0.2 (H) <=0.0 10e3/uL    RBC Morphology Confirmed RBC Indices     Platelet Assessment  Automated Count Confirmed. Platelet morphology is normal.     Automated Count Confirmed. Platelet morphology is normal.    Elliptocytes Slight (A) None Seen    Polychromasia Slight (A) None Seen    Teardrop Cells Slight (A) None Seen    Toxic Neutrophils Present (A) None Seen   Glucose by meter   Result Value Ref Range    GLUCOSE BY METER POCT 142 (H) 70  - 99 mg/dL       EXAM: CT CHEST PULMONARY EMBOLISM W CONTRAST  LOCATION: [facility]  DATE/TIME: 5/29/2022 11:27 PM     INDICATION: Short of breath  COMPARISON: 05/16/2022  TECHNIQUE: CT chest pulmonary angiogram during arterial phase injection of IV contrast. Multiplanar reformats and MIP reconstructions were performed. Dose reduction techniques were used.   CONTRAST: 100 mL Isovue 370     FINDINGS:  ANGIOGRAM CHEST: Pulmonary arteries are normal caliber and negative for pulmonary emboli. Thoracic aorta is negative for dissection. Mildly aneurysmal ascending thoracic aorta, 4.2 cm. No CT evidence of right heart strain.     LUNGS AND PLEURA: Mild mosaic attenuation is again seen. Mild groundglass opacities in the apices. Basilar atelectasis. No significant effusion.     MEDIASTINUM/AXILLAE: No adenopathy or significant pericardial effusion.     CORONARY ARTERY CALCIFICATION: Moderate.     UPPER ABDOMEN: Cholelithiasis. Stones in the cystic duct. No gallbladder distention. Wall thickening of the visualized duodenum not excluded. Slight adjacent stranding.     MUSCULOSKELETAL: Degenerative change osseous structures. Sclerotic osseous metastases.                                                                      IMPRESSION:  1.  No pulmonary embolism or thoracic aortic dissection.  2.  Mild mosaic attenuation is again seen which can be seen with air trapping/small airway disease. There are small groundglass opacities in the apices. Pneumonitis not excluded.  3.  Cholelithiasis and stones in the cystic duct. No gallbladder distention.  4.  Wall thickening of the visualized duodenum not excluded. Slight adjacent stranding.  5.  Sclerotic osseous metastases.  6.  Aneurysmal ascending thoracic aorta.

## 2022-05-31 NOTE — PROGRESS NOTES
Pt on 5 lpm 02 sating 92% RR 18 HR 72. Breath sounds diminished. Neb given x 1. Rt continue to follow.

## 2022-05-31 NOTE — PROGRESS NOTES
RESPIRATORY CARE NOTE     Patient Name: Venkatesh Alvarado  Today's Date: 5/31/2022       Pt continues on 5LNC and saturating 93-94% with stable respirations. BS: good aerations auscultated upper airways and decreased@ bases. Pt did receive Duo neb tx as ordered and tolerated well. Post neb tx, pt encouraged for deep breathing and cough.   Will continue to follow.     Can Roca RRT

## 2022-05-31 NOTE — PLAN OF CARE
Goal Outcome Evaluation:    [id number].. Pt was admitted via the ED with dizziness and weakness and found to be hypotensive and hypoxic. He has history of prostate cancer with bone mets, on chemo. Pt is a/o x4, 02 at 5L/NC and satting at 95%.  Continuous IV fluid D5+Nacl at 125ml/hr, VSS, 97.7, 18, 74, 101/56, 92%on 5L/NC

## 2022-05-31 NOTE — TREATMENT PLAN
Gave telephone report to Brigitte on P2.  Patient sent on stretcher on nasal cannula O2 with belongings of clothing. Denied pain.  Vitals stable.

## 2022-05-31 NOTE — PROGRESS NOTES
Care Management Follow Up    Length of Stay (days): 1    Expected Discharge Date: 06/03/2022     Concerns to be Addressed:     Wean oxygen, IV fluids, Hematology/Oncology consult  Patient plan of care discussed at interdisciplinary rounds: Yes    Anticipated Discharge Disposition:  Home vs home with home care vs TCU     Anticipated Discharge Services:    Anticipated Discharge DME:      Patient/family educated on Medicare website which has current facility and service quality ratings:    Education Provided on the Discharge Plan:  Per Care Team  Patient/Family in Agreement with the Plan:  yes    Referrals Placed by CM/SW:    Private pay costs discussed: Not applicable    Additional Information:  Chart reviewed.  Patient remains on oxygen 5 LPM nasal cannula and IV fluids for hydration and orthostasis.  Care Management (CM) following care progression, will review recommendations from care team, and assist with discharge planning as needed.    12:00 PM - CM went to meet with patient but he was going to take a shower.  He requests CM come back later.    Amy Craig RN

## 2022-05-31 NOTE — PLAN OF CARE
Goal Outcome Evaluation:    Problem: Plan of Care - These are the overarching goals to be used throughout the patient stay.    Goal: Readiness for Transition of Care  Outcome: Ongoing, Progressing  PT/OT following. He walked better than he expected (gait belt and walker), up to the chair after a shower. He does need some encouragement for mobility.     Continues to require 5L of oxygen, he is on a continuous pulse oximeter. IS brought to room and patient was instructed on use; he used the IS a few times. Sputum culture ordered, cup in room; he was instructed to spit into it if he's able to produce any phlegm.     Problem: Risk for Delirium  Goal: Improved Attention and Thought Clarity  Outcome: Ongoing, Progressing  Alert and oriented, but does seem anxious. Making statements about possibly wanting to be done with chemo, he was encouraged to discuss this with oncology when they see him.

## 2022-05-31 NOTE — PROGRESS NOTES
"CLINICAL NUTRITION SERVICES - ASSESSMENT NOTE     Nutrition Prescription    RECOMMENDATIONS FOR MDs/PROVIDERS TO ORDER:  None    Malnutrition Status:    Severe (MD has documented)    Recommendations already ordered by Registered Dietitian (RD):  Continue current diet    Future/Additional Recommendations:  Follow po intake, weight, need for supplements     REASON FOR ASSESSMENT  Venkatesh Alvarado is a/an 71 year old male assessed by the dietitian for Admission Nutrition Risk Screen for positive weight loss and poor po intake PTA    Pt with past medical history of prostate cancer with bony metastasis s/p radiation and currently on chemo presents with dizziness and generalized weakness.  At the ED he was found to be hypotensive and hypoxic.  Severe malnutrition per MD lost 18 lb in 6 weeks.    NUTRITION HISTORY  Pt states he does not follow any special diet at home.  He does not take protein supplements or vitamin supplements. He declines any nutritional supplements here and states he will try to eat > 75% meals.      CURRENT NUTRITION ORDERS  Diet: Regular  Intake/Tolerance: pt states he ate 100% of his breakfast this am    LABS  Labs reviewed: Na 137, K 3.4, urea nitrogen 7, Ca 6.9, alb 2.9 (5/29), Glu 150    MEDICATIONS  Medications reviewed: rocephin    ANTHROPOMETRICS  Height: 176.8 cm (5' 9.6\")  Most Recent Weight: 89.7 kg (197 lb 12.8 oz)    IBW: 75.45 kg  BMI: Overweight BMI 25-29.9  Weight History:   Wt Readings from Last 10 Encounters:   05/30/22 89.7 kg (197 lb 12.8 oz)   03/23/22 95.7 kg (211 lb)   11/12/21 95.3 kg (210 lb)   10/29/21 95.3 kg (210 lb)   04/27/21 94.3 kg (208 lb)   03/10/20 90.9 kg (200 lb 8 oz)   10/30/19 88.8 kg (195 lb 12 oz)   10/09/19 85.3 kg (188 lb)   09/01/19 90.6 kg (199 lb 11.2 oz)   09/01/19 90.6 kg (199 lb 11.2 oz)   Per above, pt has lost 14 lb in the past 2 months (6.6%)    Dosing Weight: 89.7 kg/ current weight    ASSESSED NUTRITION NEEDS  Estimated Energy Needs: 2243+ " kcals/day (25+)  Justification: Maintenance  Estimated Protein Needs:  grams protein/day (1 - 1.2 grams of pro/kg)  Justification: Increased needs  Estimated Fluid Needs: 1109-8913 mL/day (25 - 30 mL/kg)   Justification: Maintenance    PHYSICAL FINDINGS  See malnutrition section below.  Skin: petechiae noted  Deuce score: 16, nutrition noted as probably inadequate  GI: Abdomen rounded, hypoactive BS all quadrants  Last BM: Not documented    MALNUTRITION:  % Weight Loss:  > 7.5% in 3 months (severe malnutrition)  % Intake:  </= 75% for >/= 1 month (severe malnutrition)  Subcutaneous Fat Loss:  None observed  Muscle Loss:  None observed  Fluid Retention:  None noted    Malnutrition Diagnosis: Severe malnutrition  In Context of:  Acute illness or injury    NUTRITION DIAGNOSIS  Malnutrition related to acute illness and chronic illness (prostate cancer with bony mets as evidenced by 6.6% weight loss in two months and prolonged inadequate oral intake of < 75% estimated energy needs> 1 month      INTERVENTIONS  Implementation  Continue current diet  Encouraged po intake > 75% all meals  Pt declines nutritional supplements at this time-let him know what we have available in case he changes his mind    Goals  Patient to consume % of nutritionally adequate meals three times per day, or the equivalent with supplements/snacks.  Prevent further weight loss     Monitoring/Evaluation  Progress toward goals will be monitored and evaluated per protocol.

## 2022-06-01 ENCOUNTER — APPOINTMENT (OUTPATIENT)
Dept: PHYSICAL THERAPY | Facility: HOSPITAL | Age: 72
DRG: 205 | End: 2022-06-01
Payer: MEDICARE

## 2022-06-01 LAB
ANION GAP SERPL CALCULATED.3IONS-SCNC: 9 MMOL/L (ref 5–18)
BUN SERPL-MCNC: 8 MG/DL (ref 8–28)
CALCIUM SERPL-MCNC: 7.4 MG/DL (ref 8.5–10.5)
CHLORIDE BLD-SCNC: 110 MMOL/L (ref 98–107)
CO2 SERPL-SCNC: 20 MMOL/L (ref 22–31)
CREAT SERPL-MCNC: 0.74 MG/DL (ref 0.7–1.3)
ERYTHROCYTE [DISTWIDTH] IN BLOOD BY AUTOMATED COUNT: 15.9 % (ref 10–15)
GFR SERPL CREATININE-BSD FRML MDRD: >90 ML/MIN/1.73M2
GLUCOSE BLD-MCNC: 112 MG/DL (ref 70–125)
HCT VFR BLD AUTO: 32.6 % (ref 40–53)
HGB BLD-MCNC: 10.7 G/DL (ref 13.3–17.7)
MCH RBC QN AUTO: 31.6 PG (ref 26.5–33)
MCHC RBC AUTO-ENTMCNC: 32.8 G/DL (ref 31.5–36.5)
MCV RBC AUTO: 96 FL (ref 78–100)
PLATELET # BLD AUTO: 153 10E3/UL (ref 150–450)
POTASSIUM BLD-SCNC: 3.9 MMOL/L (ref 3.5–5)
RBC # BLD AUTO: 3.39 10E6/UL (ref 4.4–5.9)
SODIUM SERPL-SCNC: 139 MMOL/L (ref 136–145)
WBC # BLD AUTO: 12.6 10E3/UL (ref 4–11)

## 2022-06-01 PROCEDURE — 120N000001 HC R&B MED SURG/OB

## 2022-06-01 PROCEDURE — 99223 1ST HOSP IP/OBS HIGH 75: CPT | Performed by: NURSE PRACTITIONER

## 2022-06-01 PROCEDURE — 250N000013 HC RX MED GY IP 250 OP 250 PS 637: Performed by: STUDENT IN AN ORGANIZED HEALTH CARE EDUCATION/TRAINING PROGRAM

## 2022-06-01 PROCEDURE — 99233 SBSQ HOSP IP/OBS HIGH 50: CPT | Performed by: INTERNAL MEDICINE

## 2022-06-01 PROCEDURE — 250N000011 HC RX IP 250 OP 636: Performed by: INTERNAL MEDICINE

## 2022-06-01 PROCEDURE — G0463 HOSPITAL OUTPT CLINIC VISIT: HCPCS

## 2022-06-01 PROCEDURE — 97116 GAIT TRAINING THERAPY: CPT | Mod: GP

## 2022-06-01 PROCEDURE — 85014 HEMATOCRIT: CPT | Performed by: INTERNAL MEDICINE

## 2022-06-01 PROCEDURE — 999N000157 HC STATISTIC RCP TIME EA 10 MIN

## 2022-06-01 PROCEDURE — 85041 AUTOMATED RBC COUNT: CPT | Performed by: INTERNAL MEDICINE

## 2022-06-01 PROCEDURE — 250N000009 HC RX 250: Performed by: INTERNAL MEDICINE

## 2022-06-01 PROCEDURE — 97530 THERAPEUTIC ACTIVITIES: CPT | Mod: GP

## 2022-06-01 PROCEDURE — 94640 AIRWAY INHALATION TREATMENT: CPT

## 2022-06-01 PROCEDURE — 87205 SMEAR GRAM STAIN: CPT | Performed by: INTERNAL MEDICINE

## 2022-06-01 PROCEDURE — 80048 BASIC METABOLIC PNL TOTAL CA: CPT | Performed by: INTERNAL MEDICINE

## 2022-06-01 PROCEDURE — 250N000011 HC RX IP 250 OP 636: Performed by: STUDENT IN AN ORGANIZED HEALTH CARE EDUCATION/TRAINING PROGRAM

## 2022-06-01 PROCEDURE — 36415 COLL VENOUS BLD VENIPUNCTURE: CPT | Performed by: INTERNAL MEDICINE

## 2022-06-01 RX ORDER — ALBUTEROL SULFATE 90 UG/1
2 AEROSOL, METERED RESPIRATORY (INHALATION) EVERY 6 HOURS PRN
Status: DISCONTINUED | OUTPATIENT
Start: 2022-06-01 | End: 2022-06-07 | Stop reason: HOSPADM

## 2022-06-01 RX ADMIN — Medication 1 MG: at 21:10

## 2022-06-01 RX ADMIN — IPRATROPIUM BROMIDE AND ALBUTEROL SULFATE 3 ML: 2.5; .5 SOLUTION RESPIRATORY (INHALATION) at 07:38

## 2022-06-01 RX ADMIN — CEFTRIAXONE SODIUM 1 G: 1 INJECTION, POWDER, FOR SOLUTION INTRAMUSCULAR; INTRAVENOUS at 09:01

## 2022-06-01 RX ADMIN — ENOXAPARIN SODIUM 40 MG: 100 INJECTION SUBCUTANEOUS at 08:54

## 2022-06-01 RX ADMIN — SENNOSIDES AND DOCUSATE SODIUM 2 TABLET: 8.6; 5 TABLET ORAL at 21:10

## 2022-06-01 ASSESSMENT — ACTIVITIES OF DAILY LIVING (ADL)
ADLS_ACUITY_SCORE: 24
ADLS_ACUITY_SCORE: 32
ADLS_ACUITY_SCORE: 32
ADLS_ACUITY_SCORE: 24
ADLS_ACUITY_SCORE: 32
ADLS_ACUITY_SCORE: 32
ADLS_ACUITY_SCORE: 24
ADLS_ACUITY_SCORE: 32

## 2022-06-01 NOTE — PROGRESS NOTES
[facility]AT Treatment Plan    Patient Score: 8  Patient Acuity: 4    Clinical Indication for Therapy: Pneumonitis vs. PNA    Therapy Ordered: Duoneb QID, duoneb Q4 PRN    Assessment Summary: Pt is a former smoker, quit >30 years ago, with no pulmonary history and no home inhalers/nebs. Pt currently is on 5 lpm nasal cannula with humidity. Pt reports shortness of breath with ambulation. Respiratory rate and pattern WNL at rest. BBS diminished pre and post treatment. Strong, nonproductive cough. Sputum sample yet to be collected. Pt reports no improvement with neb tx. Will discontinue QID duoneb and add albuterol inhaler as needed.     Traci Sterling, RT  6/1/2022

## 2022-06-01 NOTE — CONSULTS
PALLIATIVE CARE CONSULT NOTE     Patient Name: Venkatesh Alvarado  Date of Admission: 5/29/2022   Requesting Clinician / Team: Adelina Fall PA-c  Reason for consult: Per oncology,  - Patient wishes to still prolong life but wants to balance it with quality of life; he feels that he no longer wants chemotherapy at this time. He will benefit from a goals of care discussion; consult palliative care          Impressions and Recommendations     1)   GOALS OF CARE are (see below for full discussion).    -Life prolonging for now.   He wants to have Rehab to get stronger post admission, he wants to  have A PET scan to see what the results are.   He states that if he only had 1 month left to live, he would prefer to be comfortable and have  Energy.   -If he chooses anymore cancer Rx after PET scan, I recommended that he follow with Franklin Kenny CNP at [facility].   I also recommended that he follow with  Palliative SW in  addition.  He would also benefit from meeting with Lakeisha Ortez at  Oncology.  (I do not believe  that [facility] offers SW or Psychology)     2)    ADVANCED CARE PLANNING  ? Patient has completed health care directive:  N  ? Surrogate  health care agent: cecy Dumont  ? Code Status:DNR DNI     3)    SYMPTOM MANAGEMENT      -Dyspnea due to:Pneumonia   Comment: Sats drop to 88% with talking  Recommendations: Per Medical team     - protein calorie malnutrition:  Comment: I encouraged to try Remeron for appetitie stimulation, he declined  Recommendations      -Generalized weakness due to Pneumonia and cancer  Comment:  Recommendations: PT OT     - situational depression due to  Cancer  Comment:Recommended Remeron, declined  Recommendations:  -Referral to PC SW and Psychologist for out pt services         4)    PSYCHOSOCIAL/SPIRITUAL SUPPORT  ? Will request spiritual care support    ? Palliative medicine will continue to follow         Admission Info         History of Present Illness:     Venkatesh Alvarado is a  71 year old male admitted on 5/29/202, he has a  past medical history (since sept 2019) of prostate cancer with lymph node (CT of chest abdomen pelvis revealed small periaortic lymph nodes and aortocaval lymph nodes)  and  bony metastasis (2.1 cm marrow replacing enhancing lesion in T11 vertebral body.7 mm lesion in left acetabulum and SI joint) There are multiple degenerative changes) s/p radiation and currently on chemo presents with dizziness and generalized weakness.    Patient has been having shortness of breath. He had a CT chest Angiogram as outpatient was negative for PE but mosaic lung attenuation suggesting pulmonary air trapping which can be seen with bronchiolitis. After he received chemotherapy, he felt that his shortness of breath continued to worsened and as such he decided to come to the ED. At the ED he was found to be hypotensive and hypoxic.                 Interval History:     5/21He complains of cough productive yellowish sputum. He is requiring 5L of O2 this morning. He endorsed loss of appetite and weight loss        Palliative Assessment/discussion     I met with patient and his ex wife who was in room.  I discussed the reason for Palliative Care Referral and our role in symptom management, patient family communication, understanding their choices for medical treatment, and providing  guidance in making difficult decisions in the framework of focusing on patient comfort and quality of life.  We discussed his feelings about further chemo.  He admits he is pretty down and feels like he his life is being robbed.  He doesn't like feeling so icky from chemo.  I also talked about the  Pneumonia overlay and how this can like a cloud over his cancer condition.  He wants the PET scan.  I talked a lot about Palliative Care walking with him thru this journey. I also discussed Hospice services, the focus of care (comfort, emotional support, symptom management, helping him thru this process), care  delivery (short  Visits by certified  providers (Hospice team:MD, RN, SW, , volunteers) and where services are held (persons home, care center or a  hospice house   (latter two are private pay for room and board) and qualifying Dx. Goal is to provide all cares in Care center and not re hospitalize but family has final decision on this.           Prognosis, goals and planning        Patient's decision making preferences: self    I have concerns about the patient/family's health literacy today: n    Prognosis, Goals, and/or Advance Care Planning were addressed today: Y    Mood, coping, and/or meaning in the context of serious illness were addressed today: Y    [facility] Symptom data:   Pain: N  Dyspnea: N  Nausea: N  Anxiety: N  Constipation: Y    Functional Status:  Current PPS% (100% normal, 0% death): 40  Baseline PPS% (2 weeks prior to admit) 60    Capacity evaluation:    Patient does   have decision making capacity based on the following:     Ability to understand relevant information about current  condition? y    Ability to demonstrate understanding of  current  illness and care needs? y    Ability to communicate  options for treatment? y      Social:   Living situation: lives alone near [address]  Baseline function no longer drives, furniture walks  Home support: son and ex wife and neighbors  Marital status: D  Number of children: 1 son  Career: retired   Hobbies; bowling    Spiritual:  Are you a spiritual person: not sure              Review of Systems:     A full 14 point review of systems was otherwise completed and is negative aside from that mentioned above    -----------------------------------------------------------------------------------------------------------------  I have reviewed and supplemented the documentation in this patient's medical record listed below regarding past medical history, social history, active medical problems, allergies and medications.     Current Problem  List:   Principal Problem:    Prostate CA w Bone Mets -- S/P Rad, on Chemo  Active Problems:    Dehydration    Orthostatic hypotension    Severe malnutrition -- lost 18 lb in 6 week    Hyponatremia    Anemia due to chemotherapy    Acute respiratory failure with hypoxia (H)      Medical/Surgical History :  Past Medical History:   Diagnosis Date     Cancer (H)     prostate     Osteoarthritis      Past Surgical History:   Procedure Laterality Date     INGUINAL HERNIA REPAIR Right      IR CVC TUNNEL PLACEMENT > 5 YRS OF AGE  10/29/2021     IR CVC TUNNEL REMOVAL RIGHT  11/22/2021     PICC AND MIDLINE TEAM LINE INSERTION  9/5/2019          Relevant Family History  Family History   Problem Relation Age of Onset     Cancer Mother      CABG Father      Cerebrovascular Disease Father      Family Status   Relation Name Status     Mo  (Not Specified)     Fa  (Not Specified)     Social History:    he  reports that he has quit smoking. He smoked 0.00 packs per day. He has never used smokeless tobacco. He reports current alcohol use of about 1.0 - 3.0 standard drink of alcohol per week. He reports that he does not use drugs.  Medication  :  Current Facility-Administered Medications   Medication     acetaminophen (TYLENOL) tablet 650 mg     albuterol (PROVENTIL HFA/VENTOLIN HFA) inhaler     calcium carbonate (TUMS) chewable tablet 1,000 mg     cefTRIAXone (ROCEPHIN) 1 g vial to attach to  mL bag for ADULTS or NS 50 mL bag for PEDS     enoxaparin ANTICOAGULANT (LOVENOX) injection 40 mg     flumazenil (ROMAZICON) injection 0.2 mg     ipratropium - albuterol 0.5 mg/2.5 mg/3 mL (DUONEB) neb solution 3 mL     lidocaine (LMX4) cream     lidocaine 1 % 0.1-1 mL     lidocaine 1 % 1-30 mL     melatonin tablet 1 mg     midazolam (VERSED) injection 0.5-2 mg     naloxone (NARCAN) injection 0.2 mg    Or     naloxone (NARCAN) injection 0.4 mg    Or     naloxone (NARCAN) injection 0.2 mg    Or     naloxone (NARCAN) injection 0.4 mg      "ondansetron (ZOFRAN ODT) ODT tab 4 mg    Or     ondansetron (ZOFRAN) injection 4 mg     prochlorperazine (COMPAZINE) injection 5 mg    Or     prochlorperazine (COMPAZINE) tablet 5 mg    Or     prochlorperazine (COMPAZINE) suppository 12.5 mg     senna-docusate (SENOKOT-S/PERICOLACE) 8.6-50 MG per tablet 1 tablet    Or     senna-docusate (SENOKOT-S/PERICOLACE) 8.6-50 MG per tablet 2 tablet     sodium chloride (PF) 0.9% PF flush 3 mL     sodium chloride (PF) 0.9% PF flush 3 mL            Allergies:  No Known Allergies  Emergency Contact Info (Pulled from chart)  Extended Emergency Contact Information  Primary Emergency Contact: ChristianoTim   [facility]  Home Phone: [phone]  Mobile Phone: [phone]  Relation: Son  Secondary Emergency Contact: ChristianoAtif   [facility]  Home Phone: [phone]  Mobile Phone: [phone]  Relation: Son     Evaluation             PERTINENT PHYSICAL EXAMINATION:  Vital Signs: Blood pressure 115/68, pulse 80, temperature 98.3  F (36.8  C), temperature source Oral, resp. rate 18, height 1.768 m (5' 9.6\"), weight 89.7 kg (197 lb 12.8 oz), SpO2 91 %.   GENERAL: laying in bed    SKIN: Warm and dry   HEENT: Normocephalic, anicteric sclera, moist mucous membranes  LUNGS: Clear to auscultation anterolaterally; labored on Nc  CARDIAC: RRR, normal s1/s2, w/o m/r/g   ABDOMINAL: BS (+), soft, non distended, non tender  : carlson in place  MUSKL: no gross joint deformities   EXTREMITIES: No edema or cyanosis, pulses 2+ and symmetrical  NEUROLOGIC: alert and oriented x3  PSYCH: depressed         Data Reviewed:     All labs/imaging reviewed in Epic     ====================================================  TT: I have personally spent a total of 112  minutes on the unit in review of medical record, consultation with the medical providers and assessment of patient today, with more than 50% of this time spent in counseling, coordination of care, and discussion with patient and wife re: " diagnostic results, prognosis, symptom management, risks and benefits of management options, and development of plan of care as noted above.  ====================================================    FATEMEH Rios, NP-C, RENATE   [facility]  Palliative Medicine  [phone]

## 2022-06-01 NOTE — PROGRESS NOTES
Care Management Follow Up    Length of Stay (days): 2    Expected Discharge Date: 06/03/2022     Concerns to be Addressed:    Wean oxygen, IV fluids, IV abx, Hem/Onc following  Patient plan of care discussed at interdisciplinary rounds: Yes    Anticipated Discharge Disposition:  Transitional Care     Anticipated Discharge Services:  TCU  Anticipated Discharge DME:      Patient/family educated on Medicare website which has current facility and service quality ratings:  Yes  Education Provided on the Discharge Plan:  Per care team  Patient/Family in Agreement with the Plan:  yes    Referrals Placed by CM/SW:  TCU's  Private pay costs discussed: Not applicable    Additional Information:  Chart reviewed.  Care Management (CM) following progression of care, will review recommendations from care team, and assist with discharge planning as needed.  Therapy recommending TCU for discharge.  Patient lives alone and is independent at baseline.      CM met with patient and his friend, Abby, to introduce self and discuss discharge recommendations and planning. Patient is agreeable with TCU.  Writer provided [facility] by [facility].  Patient prefers TCU facility in [place] or [place].  TCU referrals sent (pending)   [facility]    CM will continue to follow.      Amy Craig RN

## 2022-06-01 NOTE — PROGRESS NOTES
[facility]    Hospitalist Progress Note    Assessment & Plan   Venkatesh Alvarado is a 71-year-old male past medical history of prostate cancer with bony metastasis s/p radiation and currently on chemotherapy  presents with dizziness and generalized weakness.  At the ED he was found to be hypotensive and hypoxic:         Impression:   Principal Problem:    Prostate CA w Bone Mets -- S/P Rad, on Chemo   -- being treated by Dr. Burden with [facility] (and Dr. Shabazz)   -- get chemo q3 weeks, due this Thurs 6/2 (he has gotten 4 cycles of current chemo, and lizbeth 6 cycles of original chemo)   (?hold chemo for 2 weeks to allow him to recover?)      Hypotension     Dehydration   -- Resolved with IV fluid       Hyponatremia   -- Resolved with IV fluid     Suspected pneumonia   Acute respiratory failure with hypoxia   CT showed ground glass opacity in the right upper lung. He is immunosuppressed    -- O2, Covid negative, Resp Viral panel negative    -- Duoneb qid and prn   --continue IV ceftriaxone    --follow up sputum culture    --Follow up pulmonology consult     Active Problems:  Severe malnutrition -- lost 18 lb in 6 week  Dietary supplement as dietician        Anemia due to chemotherapy   --Hgb stable at 10.7     --Hemodynamically stable    --Monitor hgb and transfuse if hgb < 7    --Out patient follow up at the hematology/oncology clinic      DVT Prophylaxis: Enoxaparin (Lovenox) SQ  Code Status: No CPR- Do NOT Intubate   Disposition: Expected discharge in 1-2 days (once weans of O2)    Kim Vela MD     Interval History   No new complains. He is still having cough productive yellowish sputum associated with episode of dizziness while ambulating. He ambulated in the hallway earlier today. O2 requirement weaned down to 3L from 5L.     Physical Exam   Temp: 98.4  F (36.9  C) Temp src: Oral BP: 127/71 Pulse: 80   Resp: 18 SpO2: 92 % O2 Device: Nasal cannula with humidification  Oxygen Delivery: 5 LPM  Vitals:    05/29/22 2003 05/30/22 1909   Weight: 86.2 kg (190 lb) 89.7 kg (197 lb 12.8 oz)     Vital Signs with Ranges  Temp:  [97.5  F (36.4  C)-99.1  F (37.3  C)] 98.4  F (36.9  C)  Pulse:  [] 80  Resp:  [18-19] 18  BP: (108-130)/(67-72) 127/71  SpO2:  [91 %-98 %] 92 %  I/O last 3 completed shifts:  In: 1449 [P.O.:240; I.V.:1209]  Out: -     # Pain Assessment:  Current Pain Score 6/1/2022   Patient currently in pain? denies   Pain score (0-10) -   Venkatesh spence pain level was assessed and he currently denies pain.        Constitutional: Awake, alert, cooperative, no apparent distress  Respiratory: diminished air entry bilaterally, no crackles or wheezing  Cardiovascular: Regular rate and rhythm, normal S1 and S2, and no murmur noted  GI: Normal bowel sounds, soft, non-distended, non-tender  Extrem: No calf tenderness, no ankle edema  Neuro: Ox3, no focal motor or sensory deficits    Medications       cefTRIAXone  1 g Intravenous Q24H     enoxaparin ANTICOAGULANT  40 mg Subcutaneous Q24H     sodium chloride (PF)  3 mL Intracatheter Q8H       Data   Recent Labs   Lab 05/31/22  1652 05/31/22  0702 05/29/22  2131   WBC  --  10.9 10.8   HGB  --  8.6* 10.9*   MCV  --  94 91   PLT  --  146* 188   NA  --  137 130*   POTASSIUM  --  3.4* 3.8   CHLORIDE  --  109* 98   CO2  --  22 17*   BUN  --  7* 14   CR  --  0.70 1.00   ANIONGAP  --  6 15   BECCA  --  6.9* 8.0*   * 150* 82   ALBUMIN  --   --  2.9*   PROTTOTAL  --   --  6.4   BILITOTAL  --   --  0.8   ALKPHOS  --   --  68   ALT  --   --  16   AST  --   --  34       Imaging:   No results found for this or any previous visit (from the past 24 hour(s)).

## 2022-06-01 NOTE — PLAN OF CARE
Problem: Plan of Care - These are the overarching goals to be used throughout the patient stay.    Goal: Readiness for Transition of Care  Outcome: Ongoing, Progressing  Pt is asking when he will be able to go. Explained barriers of oxygen and IV antibiotic needs.     Problem: Plan of Care - These are the overarching goals to be used throughout the patient stay.    Goal: Optimal Comfort and Wellbeing  Outcome: Ongoing, Progressing  Denies pain.     Problem: Gas Exchange Impaired  Goal: Optimal Gas Exchange  Outcome: Ongoing, Progressing  Oxygen down to 3L from 5L at the start of the shift.          Pt did have an episode of urinary incontinence this morning; he was very surprised by this.

## 2022-06-01 NOTE — PLAN OF CARE
"  Problem: Plan of Care - These are the overarching goals to be used throughout the patient stay.    Goal: Optimal Comfort and Wellbeing  5/31/2022 2026 by Joyce Fung, RN  Outcome: Ongoing, Progressing  5/31/2022 2024 by Joyce Fung, RN  Outcome: Ongoing, Progressing     Problem: Pain Acute  Goal: Acceptable Pain Control and Functional Ability  Outcome: Ongoing, Progressing  Intervention: Prevent or Manage Pain  Recent Flowsheet Documentation  Taken 5/31/2022 1635 by Joyce Fung, RN  Medication Review/Management: medications reviewed     Problem: Gas Exchange Impaired  Goal: Optimal Gas Exchange  Outcome: Ongoing, Progressing  Intervention: Optimize Oxygenation and Ventilation  Recent Flowsheet Documentation  Taken 5/31/2022 1635 by Joyce Fung, RN  Head of Bed (HOB) Positioning: HOB at 30-45 degrees   Goal Outcome Evaluation:      Patient denies pain. Patient has intermittent SOB and dizziness with position changes and ambulation. Unable to wean pt off of O2. Pt on 5L of O2 satting between 89-94%, with long period of speaking or ambulation patient stays around 89-90%.Pt expressing frustration with oxygen levels not coming up from 89-90 when trying to sleep. Pt reported they had just had a \"cough attack\". PRN neb offered and refused. Pt. Reports they typically breath through mouth, offered patient oxymask x3, refused all offers. O2 at 93% after reaching 10 min later. Encouraging pt to take deep breaths through nose. Patient ambulated in halls. Waiting for patient to produce sputum for sputum culture.                  "

## 2022-06-01 NOTE — PROGRESS NOTES
RESPIRATORY CARE NOTE   Patient is on 5LNC, BS diminished, gave duoneb treatment x1, BS post treatment increased aeration, patient perceives mild improvement, patient tolerated well.     Kenneth H. Kjellberg

## 2022-06-01 NOTE — PLAN OF CARE
Problem: Pain Acute  Goal: Acceptable Pain Control and Functional Ability  Outcome: Ongoing, Progressing  Intervention: Prevent or Manage Pain  Recent Flowsheet Documentation  Taken 6/1/2022 0000 by Kirill Mancini, RN  Medication Review/Management: medications reviewed     Problem: Gas Exchange Impaired  Goal: Optimal Gas Exchange  Outcome: Ongoing, Progressing  Intervention: Optimize Oxygenation and Ventilation  Recent Flowsheet Documentation  Taken 5/31/2022 2350 by Kirill Mancini, RN  Head of Bed (HOB) Positioning: HOB at 20-30 degrees   Goal Outcome Evaluation:      Pt denied and offered no c/o pain, absent of nonverbal indicators. LS: Diminished throughout. Continuous pulse oximetry. SpO2: 90-93% on 5ltrs humidified Oxygen Via NC. However, SpO2 did drop down to 84% after coughing spell, which Pt declined need for nebulizer despite education. Cough: occasional dry/non-productive. Unable to obtain Sputum sample to send to lab. BS: normoactive x4Q. Pt is calm/pleasant most of time but expresses discouragement with his current condition. Active listening and empathy utilized.

## 2022-06-02 ENCOUNTER — APPOINTMENT (OUTPATIENT)
Dept: OCCUPATIONAL THERAPY | Facility: HOSPITAL | Age: 72
DRG: 205 | End: 2022-06-02
Payer: MEDICARE

## 2022-06-02 ENCOUNTER — APPOINTMENT (OUTPATIENT)
Dept: RADIOLOGY | Facility: HOSPITAL | Age: 72
DRG: 205 | End: 2022-06-02
Attending: INTERNAL MEDICINE
Payer: MEDICARE

## 2022-06-02 DIAGNOSIS — C61 MALIGNANT NEOPLASM OF PROSTATE (H): Primary | ICD-10-CM

## 2022-06-02 LAB
ANION GAP SERPL CALCULATED.3IONS-SCNC: 8 MMOL/L (ref 5–18)
BUN SERPL-MCNC: 12 MG/DL (ref 8–28)
CALCIUM SERPL-MCNC: 7.3 MG/DL (ref 8.5–10.5)
CHLORIDE BLD-SCNC: 110 MMOL/L (ref 98–107)
CO2 SERPL-SCNC: 21 MMOL/L (ref 22–31)
CREAT SERPL-MCNC: 0.73 MG/DL (ref 0.7–1.3)
ERYTHROCYTE [DISTWIDTH] IN BLOOD BY AUTOMATED COUNT: 15.9 % (ref 10–15)
GFR SERPL CREATININE-BSD FRML MDRD: >90 ML/MIN/1.73M2
GLUCOSE BLD-MCNC: 115 MG/DL (ref 70–125)
GLUCOSE BLDC GLUCOMTR-MCNC: 117 MG/DL (ref 70–99)
GLUCOSE BLDC GLUCOMTR-MCNC: 135 MG/DL (ref 70–99)
HCT VFR BLD AUTO: 29.9 % (ref 40–53)
HGB BLD-MCNC: 9.7 G/DL (ref 13.3–17.7)
LACTATE SERPL-SCNC: 1 MMOL/L (ref 0.7–2)
MCH RBC QN AUTO: 30.9 PG (ref 26.5–33)
MCHC RBC AUTO-ENTMCNC: 32.4 G/DL (ref 31.5–36.5)
MCV RBC AUTO: 95 FL (ref 78–100)
PLATELET # BLD AUTO: 170 10E3/UL (ref 150–450)
POTASSIUM BLD-SCNC: 4 MMOL/L (ref 3.5–5)
RBC # BLD AUTO: 3.14 10E6/UL (ref 4.4–5.9)
SODIUM SERPL-SCNC: 139 MMOL/L (ref 136–145)
WBC # BLD AUTO: 9.7 10E3/UL (ref 4–11)

## 2022-06-02 PROCEDURE — 71045 X-RAY EXAM CHEST 1 VIEW: CPT

## 2022-06-02 PROCEDURE — 250N000011 HC RX IP 250 OP 636: Performed by: INTERNAL MEDICINE

## 2022-06-02 PROCEDURE — 250N000009 HC RX 250: Performed by: INTERNAL MEDICINE

## 2022-06-02 PROCEDURE — 83605 ASSAY OF LACTIC ACID: CPT | Performed by: INTERNAL MEDICINE

## 2022-06-02 PROCEDURE — 250N000011 HC RX IP 250 OP 636: Performed by: STUDENT IN AN ORGANIZED HEALTH CARE EDUCATION/TRAINING PROGRAM

## 2022-06-02 PROCEDURE — 99223 1ST HOSP IP/OBS HIGH 75: CPT | Performed by: INTERNAL MEDICINE

## 2022-06-02 PROCEDURE — 97535 SELF CARE MNGMENT TRAINING: CPT | Mod: GO

## 2022-06-02 PROCEDURE — 82310 ASSAY OF CALCIUM: CPT | Performed by: INTERNAL MEDICINE

## 2022-06-02 PROCEDURE — 85027 COMPLETE CBC AUTOMATED: CPT | Performed by: INTERNAL MEDICINE

## 2022-06-02 PROCEDURE — 99233 SBSQ HOSP IP/OBS HIGH 50: CPT | Performed by: INTERNAL MEDICINE

## 2022-06-02 PROCEDURE — 36415 COLL VENOUS BLD VENIPUNCTURE: CPT | Performed by: INTERNAL MEDICINE

## 2022-06-02 PROCEDURE — 120N000001 HC R&B MED SURG/OB

## 2022-06-02 RX ORDER — NICOTINE POLACRILEX 4 MG
15-30 LOZENGE BUCCAL
Status: DISCONTINUED | OUTPATIENT
Start: 2022-06-02 | End: 2022-06-07 | Stop reason: HOSPADM

## 2022-06-02 RX ORDER — DEXTROSE MONOHYDRATE 25 G/50ML
25-50 INJECTION, SOLUTION INTRAVENOUS
Status: DISCONTINUED | OUTPATIENT
Start: 2022-06-02 | End: 2022-06-07 | Stop reason: HOSPADM

## 2022-06-02 RX ORDER — FUROSEMIDE 10 MG/ML
20 INJECTION INTRAMUSCULAR; INTRAVENOUS ONCE
Status: COMPLETED | OUTPATIENT
Start: 2022-06-02 | End: 2022-06-02

## 2022-06-02 RX ORDER — METHYLPREDNISOLONE SODIUM SUCCINATE 125 MG/2ML
80 INJECTION, POWDER, LYOPHILIZED, FOR SOLUTION INTRAMUSCULAR; INTRAVENOUS EVERY 8 HOURS
Status: DISCONTINUED | OUTPATIENT
Start: 2022-06-02 | End: 2022-06-03

## 2022-06-02 RX ORDER — OXYMETAZOLINE HYDROCHLORIDE 0.05 G/100ML
2 SPRAY NASAL 2 TIMES DAILY
Status: DISCONTINUED | OUTPATIENT
Start: 2022-06-02 | End: 2022-06-04

## 2022-06-02 RX ORDER — PREDNISONE 20 MG/1
40 TABLET ORAL DAILY
Status: DISCONTINUED | OUTPATIENT
Start: 2022-06-02 | End: 2022-06-02

## 2022-06-02 RX ADMIN — METHYLPREDNISOLONE SODIUM SUCCINATE 81.25 MG: 125 INJECTION, POWDER, FOR SOLUTION INTRAMUSCULAR; INTRAVENOUS at 18:43

## 2022-06-02 RX ADMIN — FUROSEMIDE 20 MG: 10 INJECTION, SOLUTION INTRAMUSCULAR; INTRAVENOUS at 10:31

## 2022-06-02 RX ADMIN — ENOXAPARIN SODIUM 40 MG: 100 INJECTION SUBCUTANEOUS at 09:00

## 2022-06-02 RX ADMIN — OXYMETAZOLINE HYDROCHLORIDE 2 SPRAY: 0.05 SPRAY NASAL at 11:29

## 2022-06-02 RX ADMIN — CEFTRIAXONE SODIUM 1 G: 1 INJECTION, POWDER, FOR SOLUTION INTRAMUSCULAR; INTRAVENOUS at 10:32

## 2022-06-02 ASSESSMENT — ACTIVITIES OF DAILY LIVING (ADL)
ADLS_ACUITY_SCORE: 29
ADLS_ACUITY_SCORE: 32
ADLS_ACUITY_SCORE: 28
ADLS_ACUITY_SCORE: 29
ADLS_ACUITY_SCORE: 28
ADLS_ACUITY_SCORE: 29
ADLS_ACUITY_SCORE: 32
ADLS_ACUITY_SCORE: 29
ADLS_ACUITY_SCORE: 32
ADLS_ACUITY_SCORE: 32

## 2022-06-02 NOTE — PROGRESS NOTES
[facility]    Hospitalist Progress Note    Assessment & Plan   Venkatesh Alvarado is a 71-year-old male past medical history of prostate cancer with bony metastasis s/p radiation and currently on chemotherapy  presents with dizziness and generalized weakness.  At the ED he was found to be hypotensive and hypoxic:         Impression:   Principal Problem:    Prostate CA w Bone Mets -- S/P Rad, on Chemo   -- being treated by Dr. Burden with [facility] (and Dr. Shabazz)   -- get chemo q3 weeks, due this Thurs 6/2 (he has gotten 4 cycles of current chemo, and lizbeth 6 cycles of original chemo)   (?hold chemo for 2 weeks to allow him to recover?)      Hypotension     Dehydration   -- Resolved with IV fluid       Hyponatremia   -- Resolved with IV fluid     Suspected pneumonia   Acute respiratory failure with hypoxia   CT showed ground glass opacity in the right upper lung. He is immunosuppressed    -- O2, Covid negative, Resp Viral panel negative    -- Duoneb qid and prn   --continue IV ceftriaxone    --follow up sputum culture    --Follow up pulmonology consult     Suspected fluid overlaod   --New bibasilar rales on exam    --Trial of IV lasix 20 mg x 1     Severe malnutrition -- lost 18 lb in 6 week  Dietary supplement as dietician        Anemia due to chemotherapy   --Hgb stable at 10.7     --Hemodynamically stable    --Monitor hgb and transfuse if hgb < 7    --Out patient follow up at the hematology/oncology clinic     Epistaxis    --Hold lovenox for now    --Afrin nasal spray   --Avoid nose blowing    --Will consider switching NC to breathing mask   --Will consult ENT if bleeding persists         DVT Prophylaxis: SCD  Code Status: No CPR- Do NOT Intubate   Disposition: Expected discharge in 1-2 days (once weans of O2)    Kim Vela MD     Interval History   He complains of nose bleed and nasal congestion, otherwise feels better. Still requiring 5L of intranasal O2.     Physical  Exam   Temp: 98  F (36.7  C) Temp src: Oral BP: 113/71 Pulse: 71   Resp: 18 SpO2: 94 % O2 Device: Nasal cannula Oxygen Delivery: 5 LPM  Vitals:    05/29/22 2003 05/30/22 1909   Weight: 86.2 kg (190 lb) 89.7 kg (197 lb 12.8 oz)     Vital Signs with Ranges  Temp:  [98  F (36.7  C)-98.3  F (36.8  C)] 98  F (36.7  C)  Pulse:  [68-90] 71  Resp:  [18-28] 18  BP: (113-155)/(68-85) 113/71  SpO2:  [91 %-94 %] 94 %  I/O last 3 completed shifts:  In: 1107 [P.O.:960; I.V.:147]  Out: -     # Pain Assessment:  Current Pain Score 6/2/2022   Patient currently in pain? denies   Pain score (0-10) -   Venkatesh s pain level was assessed and he currently denies pain.        Constitutional: Awake, alert, cooperative, no apparent distress  Respiratory: diminished air entry bilaterally, bibasilar rales   Cardiovascular: Regular rate and rhythm, normal S1 and S2, and no murmur noted  GI: Normal bowel sounds, soft, non-distended, non-tender  Extrem: No calf tenderness, no ankle edema  Neuro: Ox3, no focal motor or sensory deficits    Medications       cefTRIAXone  1 g Intravenous Q24H     enoxaparin ANTICOAGULANT  40 mg Subcutaneous Q24H     furosemide  20 mg Intravenous Once     oxymetazoline  2 spray Both Nostrils BID     sodium chloride (PF)  3 mL Intracatheter Q8H       Data   Recent Labs   Lab 06/01/22  1012 05/31/22  1652 05/31/22  0702 05/29/22  2131   WBC 12.6*  --  10.9 10.8   HGB 10.7*  --  8.6* 10.9*   MCV 96  --  94 91     --  146* 188     --  137 130*   POTASSIUM 3.9  --  3.4* 3.8   CHLORIDE 110*  --  109* 98   CO2 20*  --  22 17*   BUN 8  --  7* 14   CR 0.74  --  0.70 1.00   ANIONGAP 9  --  6 15   BECCA 7.4*  --  6.9* 8.0*    142* 150* 82   ALBUMIN  --   --   --  2.9*   PROTTOTAL  --   --   --  6.4   BILITOTAL  --   --   --  0.8   ALKPHOS  --   --   --  68   ALT  --   --   --  16   AST  --   --   --  34       Imaging:   Recent Results (from the past 24 hour(s))   XR Chest 1 View    Narrative    EXAM: XR CHEST 1  VIEW  LOCATION: [facility]  DATE/TIME: 6/2/2022 8:45 AM    INDICATION: Hypoxic.  COMPARISON: Chest CTA 05/29/2022 and multiple prior studies, chest x-ray 08/30/2019      Impression    IMPRESSION: Fine interstitial opacities are noted in the left perihilar region without any consolidation as of yet. Findings are nonspecific and may reflect a pneumonitis. No effusions. Right lung is clear. Heart and pulmonary vascularity are normal.

## 2022-06-02 NOTE — PLAN OF CARE
Problem: Plan of Care - These are the overarching goals to be used throughout the patient stay.    Goal: Optimal Comfort and Wellbeing  Outcome: Ongoing, Progressing  Intervention: Monitor Pain and Promote Comfort  Recent Flowsheet Documentation  Taken 6/1/2022 1600 by Bryan Beard RN  Pain Management Interventions: rest     Problem: Gas Exchange Impaired  Goal: Optimal Gas Exchange  Outcome: Ongoing, Progressing  Intervention: Optimize Oxygenation and Ventilation  Recent Flowsheet Documentation  Taken 6/1/2022 1600 by Bryan Beard RN  Head of Bed (HOB) Positioning: HOB at 20-30 degrees      Pt is A&O x4, VSS, denies pain. Pt is back on 5 L NC due to O2 sat dropping consistently in the 80's. Pt ate about 50% of dinner and tolerated it appropriately.

## 2022-06-02 NOTE — CONSULTS
"PULMONARY/CRITICAL CARE CONSULT NOTE    Date / Time of Admission:  5/29/2022  8:15 PM  Assessment:   71yoM with history of metastatic prostate disease followed by [facility] on chemotherapy presents with mild apical ground glass possibly consistent with viral/atypical infection versus pneumonitis. He does receive cabazitaxol for his cancer which has reports of pneumonitis as a result. Unclear that this is severe enough to warrant discontinuation of his therapy however, but it is being held. He is attempting to decide how aggressive he wants to be at this time.     Clinically Significant Risk Factors Present on Admission             # Overweight: Estimated body mass index is 28.85 kg/m  as calculated from the following:    Height as of this encounter: 1.768 m (5' 9.6\").    Weight as of this encounter: 90.2 kg (198 lb 12.8 oz).  # Severe Malnutrition: based on nutrition assessment     Principal Problem:    Prostate CA w Bone Mets -- S/P Rad, on Chemo  Active Problems:    Dehydration    Orthostatic hypotension    Severe malnutrition -- lost 18 lb in 6 week    Hyponatremia    Anemia due to chemotherapy    Acute respiratory failure with hypoxia (H)    Plan:   Discussed bronchoscopy at this time. I fear yield would be low, patient declined option currently.   Viral panel negative and sputum culture negative  Initiate IV steroids, transition to prednisone if O2 improves.Taper 40mg x4 days, 30mg x4 days, 20mg x4 days, 10mg x4 days.  Hoping this will allow for decreased O2 requirement.   Should follow up with [facility] to arrange for eventual repeat imaging and decision on further chemotherapy.   Sliding scale added while on IV steroids, concern glucose could increase considerably.               Subjective:    Cc: dyspnea, hypoxia.     HPI: 71 year old male with history of meatstatic prostate cancer on chemotherapy for this who presents with worsening shortness of breath. He presented to the ED 5/29 with " hypoxia and dyspnea.     This showed biapical ground glass. He was started on ceftriaxone. He had a negative respiratory panel. COVID and influenza negative.     Still hypoxic requiring oxygen. Frustrated at lack of progress. Was started on ceftriaxone yesterday.     Past Medical History:   Diagnosis Date     Cancer (H)     prostate     Osteoarthritis        Social History     Tobacco Use     Smoking status: Former Smoker     Packs/day: 0.00     Smokeless tobacco: Never Used     Tobacco comment: Quit 30 years ago.   Substance Use Topics     Alcohol use: Yes     Alcohol/week: 1.0 - 3.0 standard drink     Comment: Alcoholic Drinks/day: 3 times a week.       Family History   Problem Relation Age of Onset     Cancer Mother      CABG Father      Cerebrovascular Disease Father        Current Facility-Administered Medications   Medication     acetaminophen (TYLENOL) tablet 650 mg     albuterol (PROVENTIL HFA/VENTOLIN HFA) inhaler     calcium carbonate (TUMS) chewable tablet 1,000 mg     cefTRIAXone (ROCEPHIN) 1 g vial to attach to  mL bag for ADULTS or NS 50 mL bag for PEDS     [Held by provider] enoxaparin ANTICOAGULANT (LOVENOX) injection 40 mg     flumazenil (ROMAZICON) injection 0.2 mg     ipratropium - albuterol 0.5 mg/2.5 mg/3 mL (DUONEB) neb solution 3 mL     lidocaine (LMX4) cream     lidocaine 1 % 0.1-1 mL     lidocaine 1 % 1-30 mL     melatonin tablet 1 mg     midazolam (VERSED) injection 0.5-2 mg     naloxone (NARCAN) injection 0.2 mg    Or     naloxone (NARCAN) injection 0.4 mg    Or     naloxone (NARCAN) injection 0.2 mg    Or     naloxone (NARCAN) injection 0.4 mg     ondansetron (ZOFRAN ODT) ODT tab 4 mg    Or     ondansetron (ZOFRAN) injection 4 mg     oxymetazoline (AFRIN) 0.05 % spray 2 spray     prochlorperazine (COMPAZINE) injection 5 mg    Or     prochlorperazine (COMPAZINE) tablet 5 mg    Or     prochlorperazine (COMPAZINE) suppository 12.5 mg     senna-docusate (SENOKOT-S/PERICOLACE) 8.6-50 MG  "per tablet 1 tablet    Or     senna-docusate (SENOKOT-S/PERICOLACE) 8.6-50 MG per tablet 2 tablet     sodium chloride (OCEAN) 0.65 % nasal spray 1 spray     sodium chloride (PF) 0.9% PF flush 3 mL     sodium chloride (PF) 0.9% PF flush 3 mL         Review of Systems: 12-point Review and negative aside from that noted in HPI.    Objective:    Vital signs:  /71 (BP Location: Right arm)   Pulse 75   Temp 98.1  F (36.7  C) (Oral)   Resp 18   Ht 1.768 m (5' 9.6\")   Wt 90.2 kg (198 lb 12.8 oz)   SpO2 93%   BMI 28.85 kg/m      GENERAL APPEARANCE: healthy, alert and no distress     EYES: EOMI, - PERRL     NECK: no adenopathy, no asymmetry, masses, or scars and thyroid normal to palpation     RESP: lungs clear to auscultation - no rales, rhonchi or wheezes     CV: regular rates and rhythm, normal S1 S2, no S3 or S4 and no murmur, click or rub -     ABDOMEN:  soft, nontender, no HSM or masses and bowel sounds normal     MS: extremities normal- no gross deformities noted, no evidence of inflammation in joints, FROM in all extremities.     SKIN: no suspicious lesions or rashes     NEURO: Normal strength and tone, sensory exam grossly normal, mentation intact and speech normal     PSYCH: mentation appears normal. and affect normal/bright     LYMPHATICS: No axillary, cervical, inguinal, or supraclavicular nodes          Data    CT chest: personally reviewed  EXAM: CT CHEST PULMONARY EMBOLISM W CONTRAST  LOCATION: [facility]  DATE/TIME: 5/29/2022 11:27 PM     INDICATION: Short of breath  COMPARISON: 05/16/2022  TECHNIQUE: CT chest pulmonary angiogram during arterial phase injection of IV contrast. Multiplanar reformats and MIP reconstructions were performed. Dose reduction techniques were used.   CONTRAST: 100 mL Isovue 370     FINDINGS:  ANGIOGRAM CHEST: Pulmonary arteries are normal caliber and negative for pulmonary emboli. Thoracic aorta is negative for dissection. Mildly aneurysmal " ascending thoracic aorta, 4.2 cm. No CT evidence of right heart strain.     LUNGS AND PLEURA: Mild mosaic attenuation is again seen. Mild groundglass opacities in the apices. Basilar atelectasis. No significant effusion.     MEDIASTINUM/AXILLAE: No adenopathy or significant pericardial effusion.     CORONARY ARTERY CALCIFICATION: Moderate.     UPPER ABDOMEN: Cholelithiasis. Stones in the cystic duct. No gallbladder distention. Wall thickening of the visualized duodenum not excluded. Slight adjacent stranding.     MUSCULOSKELETAL: Degenerative change osseous structures. Sclerotic osseous metastases.                                                                      IMPRESSION:  1.  No pulmonary embolism or thoracic aortic dissection.  2.  Mild mosaic attenuation is again seen which can be seen with air trapping/small airway disease. There are small groundglass opacities in the apices. Pneumonitis not excluded.  3.  Cholelithiasis and stones in the cystic duct. No gallbladder distention.  4.  Wall thickening of the visualized duodenum not excluded. Slight adjacent stranding.  5.  Sclerotic osseous metastases.  6.  Aneurysmal ascending thoracic aorta.    Laboratory:  Results for orders placed or performed during the hospital encounter of 05/29/22   Basic metabolic panel   Result Value Ref Range    Sodium 139 136 - 145 mmol/L    Potassium 4.0 3.5 - 5.0 mmol/L    Chloride 110 (H) 98 - 107 mmol/L    Carbon Dioxide (CO2) 21 (L) 22 - 31 mmol/L    Anion Gap 8 5 - 18 mmol/L    Urea Nitrogen 12 8 - 28 mg/dL    Creatinine 0.73 0.70 - 1.30 mg/dL    Calcium 7.3 (L) 8.5 - 10.5 mg/dL    Glucose 115 70 - 125 mg/dL    GFR Estimate >90 >60 mL/min/1.73m2     Lab Results   Component Value Date    WBC 9.7 06/02/2022    HGB 9.7 (L) 06/02/2022    HCT 29.9 (L) 06/02/2022    MCV 95 06/02/2022     06/02/2022

## 2022-06-02 NOTE — PROGRESS NOTES
Palliative Care    Met with patient.  He indicate that he will have the PET scan after discharge. His sats were dropping on 5 Liters O2 to 72%.  This appeared to be an error, I adjusted the machine and it went to low 90's.   His finger prompt had gotten wet, asked RN to change it.      Patient is considering TCU.    I will place consult for him to follow with [facility] SW after discharge. He is currently following with [facility] NP,Franklin Kenny and see him after discharge.  I am not aware if [facility] has Palliative SW.      FATEMEH Rios, NP-C, [facility]  No charges

## 2022-06-02 NOTE — PLAN OF CARE
Problem: Plan of Care - These are the overarching goals to be used throughout the patient stay.    Goal: Optimal Comfort and Wellbeing  Outcome: Ongoing, Progressing     Problem: Gas Exchange Impaired  Goal: Optimal Gas Exchange  Outcome: Ongoing, Progressing  Intervention: Optimize Oxygenation and Ventilation  Recent Flowsheet Documentation  Taken 6/2/2022 0904 by Kamila Hicks, RN  Head of Bed (HOB) Positioning: HOB at 20-30 degrees   Goal Outcome Evaluation:           A/Ox4. VSS on 5L O2 NC. O2 sats 92-93%. On cont pulse ox. Denies pain. Up SBA with GB/W. Regular diet, fair appetite. Patient had a bloody nose x3 this shift. MD is aware and stopped lovenox. PRN nasal sprays available. L PIV SL with int abx. Encouraged IS use every hour. PT/OT following. Discharge pending TCU placement.

## 2022-06-02 NOTE — PROGRESS NOTES
Care Management Follow Up    Length of Stay (days): 3    Expected Discharge Date: 06/03/2022     Concerns to be Addressed:       Patient plan of care discussed at interdisciplinary rounds: Yes    Anticipated Discharge Disposition: Transitional Care     Anticipated Discharge Services:  Post Acute therapy   Anticipated Discharge DME:  None     Patient/family educated on Medicare website which has current facility and service quality ratings:  yes  Education Provided on the Discharge Plan:  yes  Patient/Family in Agreement with the Plan:  yes    Referrals Placed by CM/SW:  TCU   Private pay costs discussed: Not applicable    Additional Information:  MARILYN made follow up calls to Pending TCU facilities:     -[facility]   -[facility]- Spoke with admissions and at this time declined . As pt indicated desire for PET scan and ongoing cancer tx which would need to be held for TCU placement .   -[facility]       CAROLYN Treviño

## 2022-06-02 NOTE — PROGRESS NOTES
Progress Note     Primary Oncologist/Hematologist:  Dr. Brice Burden MD          Assessment and Plan:     Venkatesh is a 71 year old male with mHRPC with metastatic disease to the bones and lymph nodes seen on PSMA PET scan. The patient has been treated with docetaxel as well as abiraterone plus prednisone in the past. However PET scan and PSA showed progression. As such he initiated treatment with Cabazitaxel + Prednisone on 3/14/22.  - Last received cycle 4 of Cabazitaxel 25 mg/m  on 5/16/22 with Neulasta for neutropenia prevention. Next cycle 5 due 6/6/22. Hold Cabazitaxel.  - Continue prednisone 10 mg daily.  - Receives ADT every 6 months and Melissa day with Dr. Shabazz at [facility].  - Scheduled for a PET on 6/2/22; likely cancel this until patient is outpatient. If there is disease progression, he has 4 treatment options: apalutamide, daralutamide, rechallenge with abiraterone, and Lutetium 177 PSMA therapy.  - Patient wishes to still prolong life but wants to balance it with quality of life; he feels that he no longer wants chemotherapy at this time. I have discussed with him that before he makes a definitive decision, we should get another PET scan to determine treatment response.  - Appreciate Palliative Care.        Acute Respiratory Failure with Hypoxia, secondary to Pneumonia vs Pneumonitis  - Continue antibiotics. Viral panel negative.  - If pneumonitis then could be secondary cabazitaxel, but low likelihood.  - HOLD Cabazitaxel until lungs recover and until performance status improves  - Consult Pulmonology        Anemia secondary to chemotherapy, expected  - Last received cycle 4 on 5/16/22; dk is usually ~8-14 days.  - This is likely bone marrow delay since he is also malnourished.   - Appreciate Dietitian assistance.  - Transfuse for hgb <7.0    Generalized Weakness/Fatigue  - Likely from chemotherapy and deconditioning  - Consult PT/OT        We will continue to follow.    Adelina Fall,  "ROCHELLE  [facility]  Office [phone]   Cell: [phone]        Interval History:     Patient seen today. He feels better. He still requires 5 L of NC oxygen. He has had a nose bleed but no abnormal bleeding elsewhere. We discussed that he still has other treatment options and that they are more tolerable. One of them he has been on before - abiraterone. He is open to trying abiraterone again. Overall, he will see Dr. Burden as outpatient to learn about his options. Denies any worsening shortness of breath and chest pain. No pain.                 Review of Systems:     The 5 point Review of Systems is negative other than noted in the HPI             Medications:   Scheduled Medications    cefTRIAXone  1 g Intravenous Q24H     [Held by provider] enoxaparin ANTICOAGULANT  40 mg Subcutaneous Q24H     oxymetazoline  2 spray Both Nostrils BID     sodium chloride (PF)  3 mL Intracatheter Q8H     PRN Medications  acetaminophen, albuterol, calcium carbonate, flumazenil, ipratropium - albuterol 0.5 mg/2.5 mg/3 mL, lidocaine 4%, lidocaine (buffered or not buffered), lidocaine (buffered or not buffered), melatonin, midazolam, naloxone **OR** naloxone **OR** naloxone **OR** naloxone, ondansetron **OR** ondansetron, prochlorperazine **OR** prochlorperazine **OR** prochlorperazine, senna-docusate **OR** senna-docusate, sodium chloride, sodium chloride (PF)               Physical Exam:   Vitals were reviewed  Blood pressure 113/71, pulse 71, temperature 98  F (36.7  C), temperature source Oral, resp. rate 18, height 1.768 m (5' 9.6\"), weight 89.7 kg (197 lb 12.8 oz), SpO2 94 %.  Wt Readings from Last 4 Encounters:   05/30/22 89.7 kg (197 lb 12.8 oz)   03/23/22 95.7 kg (211 lb)   11/12/21 95.3 kg (210 lb)   10/29/21 95.3 kg (210 lb)       I/O last 3 completed shifts:  In: 1107 [P.O.:960; I.V.:147]  Out: -     Constitutional: Awake, alert, cooperative, no apparent distress   Lungs: On supplemental NC oxygen.  "   Cardiovascular: Regular rate and rhythm, normal S1 and S2, and no murmur noted   Abdomen: Normal bowel sounds, soft, non-distended, non-tender   Skin: No rashes, no cyanosis, no edema   Other:               Data:   All laboratory data and imaging studies reviewed.    CMP  Recent Labs   Lab 06/02/22  1017 06/01/22  1012 05/31/22  1652 05/31/22  0702 05/29/22  2131    139  --  137 130*   POTASSIUM 4.0 3.9  --  3.4* 3.8   CHLORIDE 110* 110*  --  109* 98   CO2 21* 20*  --  22 17*   ANIONGAP 8 9  --  6 15    112 142* 150* 82   BUN 12 8  --  7* 14   CR 0.73 0.74  --  0.70 1.00   GFRESTIMATED >90 >90  --  >90 81   BECCA 7.3* 7.4*  --  6.9* 8.0*   MAG  --   --   --   --  2.1   PROTTOTAL  --   --   --   --  6.4   ALBUMIN  --   --   --   --  2.9*   BILITOTAL  --   --   --   --  0.8   ALKPHOS  --   --   --   --  68   AST  --   --   --   --  34   ALT  --   --   --   --  16     CBC  Recent Labs   Lab 06/02/22  1015 06/01/22  1012 05/31/22  0702 05/29/22  2131   WBC 9.7 12.6* 10.9 10.8   RBC 3.14* 3.39* 2.75* 3.47*   HGB 9.7* 10.7* 8.6* 10.9*   HCT 29.9* 32.6* 25.9* 31.7*   MCV 95 96 94 91   MCH 30.9 31.6 31.3 31.4   MCHC 32.4 32.8 33.2 34.4   RDW 15.9* 15.9* 15.2* 14.6    153 146* 188     INRNo lab results found in last 7 days.  Data   Results for orders placed or performed during the hospital encounter of 05/29/22 (from the past 24 hour(s))   Lactic Acid STAT   Result Value Ref Range    Lactic Acid 1.0 0.7 - 2.0 mmol/L   XR Chest 1 View    Narrative    EXAM: XR CHEST 1 VIEW  LOCATION: [facility]  DATE/TIME: 6/2/2022 8:45 AM    INDICATION: Hypoxic.  COMPARISON: Chest CTA 05/29/2022 and multiple prior studies, chest x-ray 08/30/2019      Impression    IMPRESSION: Fine interstitial opacities are noted in the left perihilar region without any consolidation as of yet. Findings are nonspecific and may reflect a pneumonitis. No effusions. Right lung is clear. Heart and pulmonary vascularity  are normal.   CBC with platelets   Result Value Ref Range    WBC Count 9.7 4.0 - 11.0 10e3/uL    RBC Count 3.14 (L) 4.40 - 5.90 10e6/uL    Hemoglobin 9.7 (L) 13.3 - 17.7 g/dL    Hematocrit 29.9 (L) 40.0 - 53.0 %    MCV 95 78 - 100 fL    MCH 30.9 26.5 - 33.0 pg    MCHC 32.4 31.5 - 36.5 g/dL    RDW 15.9 (H) 10.0 - 15.0 %    Platelet Count 170 150 - 450 10e3/uL   Basic metabolic panel   Result Value Ref Range    Sodium 139 136 - 145 mmol/L    Potassium 4.0 3.5 - 5.0 mmol/L    Chloride 110 (H) 98 - 107 mmol/L    Carbon Dioxide (CO2) 21 (L) 22 - 31 mmol/L    Anion Gap 8 5 - 18 mmol/L    Urea Nitrogen 12 8 - 28 mg/dL    Creatinine 0.73 0.70 - 1.30 mg/dL    Calcium 7.3 (L) 8.5 - 10.5 mg/dL    Glucose 115 70 - 125 mg/dL    GFR Estimate >90 >60 mL/min/1.73m2

## 2022-06-02 NOTE — PLAN OF CARE
Goal Outcome Evaluation:    Plan of Care Reviewed With: patient     Overall Patient Progress: no change    Outcome Evaluation: Pt denied any pain. Anemia improving as evidenced by increase in Hgb. Pt still requiring supplemental O2 at 5L bubbler to keep sats >90%.    Lab Results   Component Value Date    HGB 10.7 06/01/2022      B/P: 131/76, T: 98.3, P: 76, R: 28  O2:  91% 5LNC with bubbler.    Pt denied pain.     Denied nausea.    Uneventful NOC. Will continue to monitor.

## 2022-06-03 ENCOUNTER — APPOINTMENT (OUTPATIENT)
Dept: PHYSICAL THERAPY | Facility: HOSPITAL | Age: 72
DRG: 205 | End: 2022-06-03
Payer: MEDICARE

## 2022-06-03 LAB
ANION GAP SERPL CALCULATED.3IONS-SCNC: 12 MMOL/L (ref 5–18)
BACTERIA SPT CULT: NORMAL
BUN SERPL-MCNC: 18 MG/DL (ref 8–28)
CALCIUM SERPL-MCNC: 7.7 MG/DL (ref 8.5–10.5)
CHLORIDE BLD-SCNC: 107 MMOL/L (ref 98–107)
CO2 SERPL-SCNC: 21 MMOL/L (ref 22–31)
CREAT SERPL-MCNC: 0.76 MG/DL (ref 0.7–1.3)
ERYTHROCYTE [DISTWIDTH] IN BLOOD BY AUTOMATED COUNT: 15.4 % (ref 10–15)
GFR SERPL CREATININE-BSD FRML MDRD: >90 ML/MIN/1.73M2
GLUCOSE BLD-MCNC: 152 MG/DL (ref 70–125)
GLUCOSE BLDC GLUCOMTR-MCNC: 148 MG/DL (ref 70–99)
GLUCOSE BLDC GLUCOMTR-MCNC: 148 MG/DL (ref 70–99)
GLUCOSE BLDC GLUCOMTR-MCNC: 156 MG/DL (ref 70–99)
GLUCOSE BLDC GLUCOMTR-MCNC: 197 MG/DL (ref 70–99)
GLUCOSE BLDC GLUCOMTR-MCNC: 208 MG/DL (ref 70–99)
GRAM STAIN RESULT: NORMAL
HCT VFR BLD AUTO: 31.6 % (ref 40–53)
HGB BLD-MCNC: 10.3 G/DL (ref 13.3–17.7)
MCH RBC QN AUTO: 30.8 PG (ref 26.5–33)
MCHC RBC AUTO-ENTMCNC: 32.6 G/DL (ref 31.5–36.5)
MCV RBC AUTO: 95 FL (ref 78–100)
PLATELET # BLD AUTO: 183 10E3/UL (ref 150–450)
POTASSIUM BLD-SCNC: 3.4 MMOL/L (ref 3.5–5)
RBC # BLD AUTO: 3.34 10E6/UL (ref 4.4–5.9)
SODIUM SERPL-SCNC: 140 MMOL/L (ref 136–145)
WBC # BLD AUTO: 8 10E3/UL (ref 4–11)

## 2022-06-03 PROCEDURE — 250N000011 HC RX IP 250 OP 636: Performed by: INTERNAL MEDICINE

## 2022-06-03 PROCEDURE — 99233 SBSQ HOSP IP/OBS HIGH 50: CPT | Performed by: NURSE PRACTITIONER

## 2022-06-03 PROCEDURE — 85027 COMPLETE CBC AUTOMATED: CPT | Performed by: INTERNAL MEDICINE

## 2022-06-03 PROCEDURE — 82310 ASSAY OF CALCIUM: CPT | Performed by: INTERNAL MEDICINE

## 2022-06-03 PROCEDURE — 120N000001 HC R&B MED SURG/OB

## 2022-06-03 PROCEDURE — 97116 GAIT TRAINING THERAPY: CPT | Mod: GP | Performed by: PHYSICAL THERAPIST

## 2022-06-03 PROCEDURE — 99232 SBSQ HOSP IP/OBS MODERATE 35: CPT | Performed by: INTERNAL MEDICINE

## 2022-06-03 PROCEDURE — 36415 COLL VENOUS BLD VENIPUNCTURE: CPT | Performed by: INTERNAL MEDICINE

## 2022-06-03 PROCEDURE — 250N000012 HC RX MED GY IP 250 OP 636 PS 637: Performed by: INTERNAL MEDICINE

## 2022-06-03 PROCEDURE — 99233 SBSQ HOSP IP/OBS HIGH 50: CPT | Performed by: INTERNAL MEDICINE

## 2022-06-03 PROCEDURE — 250N000013 HC RX MED GY IP 250 OP 250 PS 637: Performed by: STUDENT IN AN ORGANIZED HEALTH CARE EDUCATION/TRAINING PROGRAM

## 2022-06-03 RX ORDER — METHYLPREDNISOLONE SODIUM SUCCINATE 125 MG/2ML
60 INJECTION, POWDER, LYOPHILIZED, FOR SOLUTION INTRAMUSCULAR; INTRAVENOUS EVERY 8 HOURS
Status: DISCONTINUED | OUTPATIENT
Start: 2022-06-03 | End: 2022-06-05

## 2022-06-03 RX ORDER — POLYETHYLENE GLYCOL 3350 17 G/17G
17 POWDER, FOR SOLUTION ORAL DAILY
Status: DISCONTINUED | OUTPATIENT
Start: 2022-06-03 | End: 2022-06-07 | Stop reason: HOSPADM

## 2022-06-03 RX ADMIN — METHYLPREDNISOLONE SODIUM SUCCINATE 81.25 MG: 125 INJECTION, POWDER, FOR SOLUTION INTRAMUSCULAR; INTRAVENOUS at 03:38

## 2022-06-03 RX ADMIN — CEFTRIAXONE SODIUM 1 G: 1 INJECTION, POWDER, FOR SOLUTION INTRAMUSCULAR; INTRAVENOUS at 09:48

## 2022-06-03 RX ADMIN — INSULIN ASPART 1 UNITS: 100 INJECTION, SOLUTION INTRAVENOUS; SUBCUTANEOUS at 13:28

## 2022-06-03 RX ADMIN — INSULIN ASPART 2 UNITS: 100 INJECTION, SOLUTION INTRAVENOUS; SUBCUTANEOUS at 18:46

## 2022-06-03 RX ADMIN — METHYLPREDNISOLONE SODIUM SUCCINATE 81.25 MG: 125 INJECTION, POWDER, FOR SOLUTION INTRAMUSCULAR; INTRAVENOUS at 10:53

## 2022-06-03 RX ADMIN — OXYMETAZOLINE HYDROCHLORIDE 2 SPRAY: 0.05 SPRAY NASAL at 09:49

## 2022-06-03 RX ADMIN — OXYMETAZOLINE HYDROCHLORIDE 2 SPRAY: 0.05 SPRAY NASAL at 21:48

## 2022-06-03 RX ADMIN — METHYLPREDNISOLONE SODIUM SUCCINATE 62.5 MG: 125 INJECTION, POWDER, FOR SOLUTION INTRAMUSCULAR; INTRAVENOUS at 18:46

## 2022-06-03 RX ADMIN — POLYETHYLENE GLYCOL 3350 17 GRAM ORAL POWDER PACKET 17 G: at 21:49

## 2022-06-03 ASSESSMENT — ACTIVITIES OF DAILY LIVING (ADL)
ADLS_ACUITY_SCORE: 31
ADLS_ACUITY_SCORE: 29
ADLS_ACUITY_SCORE: 29
ADLS_ACUITY_SCORE: 31
ADLS_ACUITY_SCORE: 29
ADLS_ACUITY_SCORE: 29
ADLS_ACUITY_SCORE: 31
ADLS_ACUITY_SCORE: 29

## 2022-06-03 NOTE — PROGRESS NOTES
PALLIATIVE CARE PROGRESS  NOTE     Patient Name: Venkatesh Alvarado  Date of visit : 6/3/22             Impressions and Recommendations     1)   GOALS OF CARE are (see below for full discussion).    -Life prolonging for now.    Plan is to discharge home with PT, OT and nursing cares  When he is ready, he will follow-up with Dr. Burden for palliative cancer treatment.  I contacted [facility] and set him up to follow with Franklin Kenny,  Palliative Care NP (for symptom management and ongoing Goals of care discussions  and  With Esther Rowley, Palliative Care Social work ( For virtual psychosocial support).          2)    ADVANCED CARE PLANNING  ? Patient has completed health care directive:  N  ? Surrogate  health care agent: cecy Dumont  ? Code Status:DNR DNI     3)    SYMPTOM MANAGEMENT     Palliative is not managing symptoms here     Dyspnea due to to Pneumonia   Comment: he is tolerating O2 weaning, now on 2 L with goal of not  Having to discharge  home with O2   Recommendations: per  Medicine Team    4)    PSYCHOSOCIAL/SPIRITUAL SUPPORT  ?  requested spiritual care support    ? Palliative medicine will continue to follow         Admission Info         History of Present Illness:     Venkatesh Alvarado is a 71 year old male admitted on 5/29/202, he has a  past medical history (since sept 2019) of prostate cancer with lymph node (CT of chest abdomen pelvis revealed small periaortic lymph nodes and aortocaval lymph nodes)  and  bony metastasis (2.1 cm marrow replacing enhancing lesion in T11 vertebral body.7 mm lesion in left acetabulum and SI joint) There are multiple degenerative changes) s/p radiation and currently on chemo presents with dizziness and generalized weakness.    Patient has been having shortness of breath. He had a CT chest Angiogram as outpatient was negative for PE but mosaic lung attenuation suggesting pulmonary air trapping which can be seen with bronchiolitis. After he received chemotherapy, he  felt that his shortness of breath continued to worsened and as such he decided to come to the ED. At the ED he was found to be hypotensive and hypoxic.                 Interval History:   6/3 Respiratory status improving  6/2 patient wants PET scan and further non chemo tx     Palliative Assessment/discussion   6/3 Met with patient. I explained that I make an appointment for him to follow with Franklin Kenny NP and Esther KELLY virtually at [facility]    6/2 Met with patient.  He indicate that he will have the PET scan after discharge. His sats were dropping on 5 Liters O2 to 72%.  This appeared to be an error, I adjusted the machine and it went to low 90's.   His finger prompt had gotten wet, asked RN to change it.        Patient is considering TCU.     6/1 I met with patient and his ex wife who was in room.  I discussed the reason for Palliative Care Referral and our role in symptom management, patient family communication, understanding their choices for medical treatment, and providing  guidance in making difficult decisions in the framework of focusing on patient comfort and quality of life.  We discussed his feelings about further chemo.  He admits he is pretty down and feels like he his life is being robbed.  He doesn't like feeling so icky from chemo.  I also talked about the  Pneumonia overlay and how this can like a cloud over his cancer condition.  He wants the PET scan.  I talked a lot about Palliative Care walking with him thru this journey. I also discussed Hospice services, the focus of care (comfort, emotional support, symptom management, helping him thru this process), care delivery (short  Visits by certified  providers (Hospice team:MD, RN, LETICIA, , volunteers) and where services are held (persons home, care center or a  hospice house   (latter two are private pay for room and board) and qualifying Dx. Goal is to provide all cares in Care center and not re hospitalize but family has  final decision on this.           Prognosis, goals and planning        Patient's decision making preferences: self    I have concerns about the patient/family's health literacy today: n    Prognosis, Goals, and/or Advance Care Planning were addressed today: Y    Mood, coping, and/or meaning in the context of serious illness were addressed today: Y    [facility] Symptom data:   Pain: N  Dyspnea: N  Nausea: N  Anxiety: N  Constipation: Y    Functional Status:  Current PPS% (100% normal, 0% death): 40  Baseline PPS% (2 weeks prior to admit) 60    Capacity evaluation:    Patient does   have decision making capacity based on the following:     Ability to understand relevant information about current  condition? y    Ability to demonstrate understanding of  current  illness and care needs? y    Ability to communicate  options for treatment? y      Social:   Living situation: lives alone near [address]  Baseline function no longer drives, furniture walks  Home support: son and ex wife and neighbors  Marital status: D  Number of children: 1 son  Career: retired   Hobbies; bowling    Spiritual:  Are you a spiritual person: not sure              Review of Systems:     A full 14 point review of systems was otherwise completed and is negative aside from that mentioned above    Medication  :  Current Facility-Administered Medications   Medication     acetaminophen (TYLENOL) tablet 650 mg     albuterol (PROVENTIL HFA/VENTOLIN HFA) inhaler     calcium carbonate (TUMS) chewable tablet 1,000 mg     cefTRIAXone (ROCEPHIN) 1 g vial to attach to  mL bag for ADULTS or NS 50 mL bag for PEDS     glucose gel 15-30 g    Or     dextrose 50 % injection 25-50 mL    Or     glucagon injection 1 mg     [Held by provider] enoxaparin ANTICOAGULANT (LOVENOX) injection 40 mg     flumazenil (ROMAZICON) injection 0.2 mg     insulin aspart (NovoLOG) injection (RAPID ACTING)     insulin aspart (NovoLOG) injection (RAPID ACTING)      "ipratropium - albuterol 0.5 mg/2.5 mg/3 mL (DUONEB) neb solution 3 mL     lidocaine (LMX4) cream     lidocaine 1 % 0.1-1 mL     lidocaine 1 % 1-30 mL     melatonin tablet 1 mg     methylPREDNISolone sodium succinate (solu-MEDROL) injection 81.25 mg     midazolam (VERSED) injection 0.5-2 mg     naloxone (NARCAN) injection 0.2 mg    Or     naloxone (NARCAN) injection 0.4 mg    Or     naloxone (NARCAN) injection 0.2 mg    Or     naloxone (NARCAN) injection 0.4 mg     ondansetron (ZOFRAN ODT) ODT tab 4 mg    Or     ondansetron (ZOFRAN) injection 4 mg     oxymetazoline (AFRIN) 0.05 % spray 2 spray     prochlorperazine (COMPAZINE) injection 5 mg    Or     prochlorperazine (COMPAZINE) tablet 5 mg    Or     prochlorperazine (COMPAZINE) suppository 12.5 mg     senna-docusate (SENOKOT-S/PERICOLACE) 8.6-50 MG per tablet 1 tablet    Or     senna-docusate (SENOKOT-S/PERICOLACE) 8.6-50 MG per tablet 2 tablet     sodium chloride (OCEAN) 0.65 % nasal spray 1 spray     sodium chloride (PF) 0.9% PF flush 3 mL     sodium chloride (PF) 0.9% PF flush 3 mL                 Evaluation             PERTINENT PHYSICAL EXAMINATION:  Vital Signs: Blood pressure 109/85, pulse 85, temperature 97.7  F (36.5  C), temperature source Oral, resp. rate 20, height 1.768 m (5' 9.6\"), weight 90.2 kg (198 lb 12.8 oz), SpO2 96 %.   GENERAL: laying in bed    SKIN: Warm and dry   HEENT: Normocephalic, anicteric sclera, moist mucous membranes  LUNGS: Clear to auscultation anterolaterally; less  labored on Nc 2 L, sats 90%  CARDIAC: RRR, normal s1/s2, w/o m/r/g   ABDOMINAL: BS (+), soft, non distended, non tender  : carlson in place  MUSKL: no gross joint deformities   EXTREMITIES: No edema or cyanosis, pulses 2+ and symmetrical  NEUROLOGIC: alert and oriented x3  PSYCH: depressed         Data Reviewed:     All labs/imaging reviewed in Epic     ====================================================  TT: I have personally spent a total of 36 minutes on the unit in " review of medical record, consultation with the medical providers and assessment of patient today, with more than 50% of this time spent in counseling, coordination of care, and discussion with patient and wife re: diagnostic results, prognosis, symptom management, risks and benefits of management options, and development of plan of care as noted above.  ====================================================    FATEMEH Rios, NP-C, [facility]   [facility]  Palliative Medicine  [phone]

## 2022-06-03 NOTE — PROGRESS NOTES
[facility]    Hospitalist Progress Note    Assessment & Plan   Venkatesh Alvarado is a 71-year-old male past medical history of prostate cancer with bony metastasis s/p radiation and currently on chemotherapy  presents with dizziness and generalized weakness.  At the ED he was found to be hypotensive and hypoxic:         Impression:   Principal Problem:    Prostate CA w Bone Mets -- S/P Rad, on Chemo   -- being treated by Dr. Burden with [facility] (and Dr. 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 He is immunosuppressed.  Unclear if cabazitaxel induced pneumonitis is contributing to respiratory failure.   -- O2, Covid negative, Resp Viral panel negative.  Sputum culture showed normal kinza.   -- Duoneb qid and prn   -- Stop IV ceftriaxone as recommended by pulmonologist   -- IV methylprednisolone as per pulmonologist.    -- Pulmonology [id number] assistance.    Severe malnutrition -- lost 18 lb in 6 week  Dietary supplement as dietician        Anemia due to chemotherapy   --Hgb stable    --Hemodynamically stable    --Monitor hgb and transfuse if hgb < 7    --Out patient follow up at the hematology/oncology clinic     Epistaxis    --Hold lovenox for 1 more day.   -- Continue Afrin nasal spray   --Avoid nose blowing            DVT Prophylaxis: SCD  Code Status: No CPR- Do NOT Intubate   Disposition: Expected discharge in 1-2 days (once weans of O2)    Kim Vela MD     Interval History   No new complaints today.  He states his breathing has improved.  Requiring 2 L of supplemental  oxygen to maintain oxygen saturation in the mid 90s; occasionally able to maintain oxygen saturation in the low 90s off oxygen.  Epistasis has resolved.      Physical Exam   Temp: 97.7  F (36.5  C) Temp src: Oral BP: 109/85 Pulse: 85   Resp: 20 SpO2: 96 % O2 Device: Nasal cannula Oxygen Delivery: 2 LPM  Vitals:    05/29/22 2003 05/30/22 1909 06/02/22 1431   Weight: 86.2 kg (190 lb) 89.7 kg (197 lb 12.8 oz) 90.2 kg (198 lb 12.8 oz)     Vital Signs with Ranges  Temp:  [97.6  F (36.4  C)-98.1  F (36.7  C)] 97.7  F (36.5  C)  Pulse:  [70-85] 85  Resp:  [17-20] 20  BP: (109-144)/(71-92) 109/85  SpO2:  [92 %-96 %] 96 %  I/O last 3 completed shifts:  In: 140 [I.V.:140]  Out: -     # Pain Assessment:  Current Pain Score 6/3/2022   Patient currently in pain? denies   Pain score (0-10) -   Venkatesh s pain level was assessed and he currently denies pain.        Constitutional: Awake, alert, cooperative, no apparent distress  Respiratory: diminished air entry bilaterally, bibasilar rales   Cardiovascular: Regular rate and rhythm, normal S1 and S2, and no murmur noted  GI: Normal bowel sounds, soft, non-distended, non-tender  Extrem: No calf tenderness, no ankle edema  Neuro: Ox3, no focal motor or sensory deficits    Medications       [Held by provider] enoxaparin ANTICOAGULANT  40 mg Subcutaneous Q24H     insulin aspart  1-7 Units Subcutaneous TID AC     insulin aspart  1-5 Units Subcutaneous At Bedtime     methylPREDNISolone  62.5 mg Intravenous Q8H     oxymetazoline  2 spray Both Nostrils BID     sodium chloride (PF)  3 mL Intracatheter Q8H       Data   Recent Labs   Lab 06/03/22  1239 06/03/22  1028 06/03/22  0846 06/02/22  1913 06/02/22  1017 06/02/22  1015 06/01/22  1012 05/31/22  0702 05/29/22  2131   WBC  --  8.0  --   --   --  9.7 12.6*   < > 10.8   HGB  --  10.3*  --   --   --  9.7* 10.7*   < > 10.9*   MCV  --  95  --   --   --  95 96   < > 91   PLT  --  183  --   --   --  170 153   < > 188   NA  --  140  --   --  139   --  139   < > 130*   POTASSIUM  --  3.4*  --   --  4.0  --  3.9   < > 3.8   CHLORIDE  --  107  --   --  110*  --  110*   < > 98   CO2  --  21*  --   --  21*  --  20*   < > 17*   BUN  --  18  --   --  12  --  8   < > 14   CR  --  0.76  --   --  0.73  --  0.74   < > 1.00   ANIONGAP  --  12  --   --  8  --  9   < > 15   BECCA  --  7.7*  --   --  7.3*  --  7.4*   < > 8.0*   * 152* 148*   < > 115  --  112   < > 82   ALBUMIN  --   --   --   --   --   --   --   --  2.9*   PROTTOTAL  --   --   --   --   --   --   --   --  6.4   BILITOTAL  --   --   --   --   --   --   --   --  0.8   ALKPHOS  --   --   --   --   --   --   --   --  68   ALT  --   --   --   --   --   --   --   --  16   AST  --   --   --   --   --   --   --   --  34    < > = values in this interval not displayed.       Imaging:   No results found for this or any previous visit (from the past 24 hour(s)).

## 2022-06-03 NOTE — PLAN OF CARE
Problem: Risk for Delirium  Goal: Optimal Coping  Outcome: Ongoing, Progressing   Goal Outcome Evaluation:  Patient pleasant and cooperative. A&Ox4. Patient ate 100% of soup this evening. BS =117 and 135. Assist x1 with gait belt and walker. Ambulated hallways x2 this shift and up to the chair for dinner. No nose bleeds this shift. Continues on 5L O2 via NC, O2 sats low to mid 90's. Denies pain. Shortness of breath on exertion. LS clear and diminished.

## 2022-06-03 NOTE — PROGRESS NOTES
"Pulmonary Progress Note  6/3/2022      Admit Date: 5/29/2022  CODE: No CPR- Do NOT Intubate    Reason for Consult: pulmonary infiltrates, acute respiratory failure with hypoxemia    Assessment/Plan:   71yoM with history of metastatic prostate disease followed by [facility] on chemotherapy presents with mild apical ground glass possibly consistent with viral/atypical infection versus pneumonitis possible due to cabazitaxol.   Improved with starting IV steroids on 6/2. Down to 2Lpm from 5Lpm yesterday.  PCT neg, sputum culture unremarkable.     Recommendations:  - titrate FiO2 for goal SpO2 94-96%, avoid hyperoxia  - encourage OOB, PT/OT, push IS  - discontinue ceftriaxone  - reduce IV steroids to 60mg q8h of methylprednisolone. If continues to improve can likely transition to PO prednisone over the weekend  - no indication for bronchoscopy at this time.  - will need follow up in either pulm clinic or with [facility], with repeat chest imaging, in 4-6 weeks. Will arrange once closer to discharge    Will continue to follow.       Duane (Miguel) MD Gt  [facility] Pulmonary & Critical Care  Pager [phone]  Clinic [phone]  Fax [phone]     Subjective/Interim Events:   Feeling better.  Down to 2Lpm O2, with SpO2 low 90s  Minimal cough  No hemoptysis.   Wants to get up and walk around more      Medications:       [Held by provider] enoxaparin ANTICOAGULANT  40 mg Subcutaneous Q24H     insulin aspart  1-7 Units Subcutaneous TID AC     insulin aspart  1-5 Units Subcutaneous At Bedtime     methylPREDNISolone  62.5 mg Intravenous Q8H     oxymetazoline  2 spray Both Nostrils BID     sodium chloride (PF)  3 mL Intracatheter Q8H         Exam/Data:   Vitals  /85 (BP Location: Left arm)   Pulse 85   Temp 97.7  F (36.5  C) (Oral)   Resp 20   Ht 1.768 m (5' 9.6\")   Wt 90.2 kg (198 lb 12.8 oz)   SpO2 96%   BMI 28.85 kg/m       I/O last 3 completed shifts:  In: 10 [I.V.:10]  Out: - "   Weight change:   [unfilled]  Resp: 20      EXAM:  Physical Exam  Gen: awake, alert, oriented, no distress  HEENT: NT, no RENATA  CV: RRR, no m/g/r  Resp: CTAB  Abd: soft, nontender, BS+  Skin: no rashes or lesions  Ext: no edema  Neuro: PERRL, nonfocal exam    ROS:  A 10-system review was obtained and is negative with the exception of the symptoms noted above.    DATA:    PFT DATAm/a    Sputum cx neg  PCT normal      IMAGING:   CT Chest Pulmonary Embolism w Contrast    Result Date: 5/29/2022  EXAM: CT CHEST PULMONARY EMBOLISM W CONTRAST LOCATION: [facility] DATE/TIME: 5/29/2022 11:27 PM INDICATION: Short of breath COMPARISON: 05/16/2022 TECHNIQUE: CT chest pulmonary angiogram during arterial phase injection of IV contrast. Multiplanar reformats and MIP reconstructions were performed. Dose reduction techniques were used. CONTRAST: 100 mL Isovue 370 FINDINGS: ANGIOGRAM CHEST: Pulmonary arteries are normal caliber and negative for pulmonary emboli. Thoracic aorta is negative for dissection. Mildly aneurysmal ascending thoracic aorta, 4.2 cm. No CT evidence of right heart strain. LUNGS AND PLEURA: Mild mosaic attenuation is again seen. Mild groundglass opacities in the apices. Basilar atelectasis. No significant effusion. MEDIASTINUM/AXILLAE: No adenopathy or significant pericardial effusion. CORONARY ARTERY CALCIFICATION: Moderate. UPPER ABDOMEN: Cholelithiasis. Stones in the cystic duct. No gallbladder distention. Wall thickening of the visualized duodenum not excluded. Slight adjacent stranding. MUSCULOSKELETAL: Degenerative change osseous structures. Sclerotic osseous metastases.     IMPRESSION: 1.  No pulmonary embolism or thoracic aortic dissection. 2.  Mild mosaic attenuation is again seen which can be seen with air trapping/small airway disease. There are small groundglass opacities in the apices. Pneumonitis not excluded. 3.  Cholelithiasis and stones in the cystic duct. No gallbladder  distention. 4.  Wall thickening of the visualized duodenum not excluded. Slight adjacent stranding. 5.  Sclerotic osseous metastases. 6.  Aneurysmal ascending thoracic aorta.

## 2022-06-03 NOTE — PROGRESS NOTES
"CLINICAL NUTRITION SERVICES - REASSESSMENT NOTE     Nutrition Prescription    RECOMMENDATIONS FOR MDs/PROVIDERS TO ORDER:      Malnutrition Status:    Severe    Recommendations already ordered by Registered Dietitian (RD):  Ensure daily - strawberry at lunch, okay to sub vanilla Vital Cuisine shake if Dietary out of Ensure  Magic cup daily - berry at dinner  Special k bar - evening snacks    Future/Additional Recommendations:  Monitor po/adjust supplements     EVALUATION OF THE PROGRESS TOWARD GOALS   Diet: Regular  Intake: 25-75%  Pt reports eating eggs, sausage today - will eat muffin.  He says he was not eating when admitted - doing better now     NEW FINDINGS   Pt has never tried nutrition supplements - he is willing to try.  Encouraged pt to let nursing know if he dislikes any of these supplements so they can let RD know to stop.    ANTHROPOMETRICS  Height: 176.8 cm (5' 9.6\")  Admit weight: 190 lb  Most Recent Weight: 90.2 kg (198 lb 12.8 oz)    IBW: 75.4 kg  BMI: Overweight BMI 25-29.9    GI  Last BM     LABS  Reviewed  K 3.4 L  FSB    MEDICATIONS  Reviewed  Novolog, solu- medrol    CURRENT NUTRITION DIAGNOSIS  Malnutrition related to acute illness and chronic illness (prostate cancer with bony mets as evidenced by 6.6% weight loss in two months and prolonged inadequate oral intake of < 75% estimated energy needs> 1 month    INTERVENTIONS  Implementation  Medical food supplement therapy    Goals  Patient to consume % of nutritionally adequate meals three times per day, or the equivalent with supplements/snacks. - progressiing  Prevent further weight loss - met    Monitoring/Evaluation  Progress toward goals will be monitored and evaluated per protocol.    "

## 2022-06-03 NOTE — PROGRESS NOTES
Progress Note - CHART CHECK      Interval History:   Patient not seen today. Chart reviewed. No significant events. Is weaning off supplemental NC oxygen; O2 needs appear to improve with steroids.       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 The patient has been treated with docetaxel as well as abiraterone plus prednisone in the past. However PET scan and PSA showed progression. As such he initiated treatment with Cabazitaxel + Prednisone on 3/14/22.  - Last received cycle 4 of Cabazitaxel 25 mg/m  on 5/16/22 with Neulasta for neutropenia prevention. Next cycle 5 due 6/6/22. Hold Cabazitaxel.  - Continue prednisone 10 mg daily.  - Receives ADT every 6 months and Melissa day with Dr. Shabazz at [facility].  - Scheduled for a PET on 6/2/22; likely cancel this until patient is outpatient. If there is disease progression, he has 4 treatment options: apalutamide, daralutamide, rechallenge with abiraterone, and Lutetium 177 PSMA therapy.  - Patient wishes to still prolong life but wants to balance it with quality of life; he feels that he no longer wants chemotherapy at this time. I have discussed with him that before he makes a definitive decision, we should get another PET scan to determine treatment response.  - Appreciate Palliative Care.        Acute Respiratory Failure with Hypoxia, secondary to Pneumonia vs Pneumonitis  - Viral panel negative.  - Appreciate Pulmonology.  - Now off ceftriazone and on methylprednisolone with improvement.  - HOLD Cabazitaxel until lungs recover and until performance status improves        Anemia secondary to chemotherapy, expected  - Last received cycle 4 on 5/16/22; dk is usually ~8-14 days.  - Hgb improving now and is 10.3 today.     Generalized Weakness/Fatigue  - Likely from chemotherapy and deconditioning  - Recommend PT/OT  -  "Malnutrition contributing; Appreciate Dietitian.        We will follow peripherally. If there are any questions, please do not hesitate to call. The on-call physician will be the patient's primary oncologist, Dr. Burden.       Adelina Fall PA-C  [facility]  [phone] (office), [phone] (cell)              Medications:   Scheduled Medications    [Held by provider] enoxaparin ANTICOAGULANT  40 mg Subcutaneous Q24H     insulin aspart  1-7 Units Subcutaneous TID AC     insulin aspart  1-5 Units Subcutaneous At Bedtime     methylPREDNISolone  62.5 mg Intravenous Q8H     oxymetazoline  2 spray Both Nostrils BID     sodium chloride (PF)  3 mL Intracatheter Q8H     PRN Medications  acetaminophen, albuterol, calcium carbonate, glucose **OR** dextrose **OR** glucagon, flumazenil, ipratropium - albuterol 0.5 mg/2.5 mg/3 mL, lidocaine 4%, lidocaine (buffered or not buffered), lidocaine (buffered or not buffered), melatonin, midazolam, naloxone **OR** naloxone **OR** naloxone **OR** naloxone, ondansetron **OR** ondansetron, prochlorperazine **OR** prochlorperazine **OR** prochlorperazine, senna-docusate **OR** senna-docusate, sodium chloride, sodium chloride (PF)               Objective:   Vitals were reviewed  Blood pressure 109/85, pulse 85, temperature 97.7  F (36.5  C), temperature source Oral, resp. rate 20, height 1.768 m (5' 9.6\"), weight 90.2 kg (198 lb 12.8 oz), SpO2 96 %.  Wt Readings from Last 4 Encounters:   06/02/22 90.2 kg (198 lb 12.8 oz)   03/23/22 95.7 kg (211 lb)   11/12/21 95.3 kg (210 lb)   10/29/21 95.3 kg (210 lb)       I/O last 3 completed shifts:  In: 10 [I.V.:10]  Out: -            Data:   All laboratory data and imaging studies reviewed.    CMP  Recent Labs   Lab 06/03/22  1239 06/03/22  1028 06/03/22  0846 06/03/22  0206 06/02/22  1913 06/02/22  1017 06/01/22  1012 05/31/22  1652 05/31/22  0702 05/29/22  2131   NA  --  140  --   --   --  139 139  --  137 130*   POTASSIUM  --  3.4*  --   --   " --  4.0 3.9  --  3.4* 3.8   CHLORIDE  --  107  --   --   --  110* 110*  --  109* 98   CO2  --  21*  --   --   --  21* 20*  --  22 17*   ANIONGAP  --  12  --   --   --  8 9  --  6 15   * 152* 148* 156*   < > 115 112   < > 150* 82   BUN  --  18  --   --   --  12 8  --  7* 14   CR  --  0.76  --   --   --  0.73 0.74  --  0.70 1.00   GFRESTIMATED  --  >90  --   --   --  >90 >90  --  >90 81   BECCA  --  7.7*  --   --   --  7.3* 7.4*  --  6.9* 8.0*   MAG  --   --   --   --   --   --   --   --   --  2.1   PROTTOTAL  --   --   --   --   --   --   --   --   --  6.4   ALBUMIN  --   --   --   --   --   --   --   --   --  2.9*   BILITOTAL  --   --   --   --   --   --   --   --   --  0.8   ALKPHOS  --   --   --   --   --   --   --   --   --  68   AST  --   --   --   --   --   --   --   --   --  34   ALT  --   --   --   --   --   --   --   --   --  16    < > = values in this interval not displayed.     CBC  Recent Labs   Lab 06/03/22  1028 06/02/22  1015 06/01/22  1012 05/31/22  0702   WBC 8.0 9.7 12.6* 10.9   RBC 3.34* 3.14* 3.39* 2.75*   HGB 10.3* 9.7* 10.7* 8.6*   HCT 31.6* 29.9* 32.6* 25.9*   MCV 95 95 96 94   MCH 30.8 30.9 31.6 31.3   MCHC 32.6 32.4 32.8 33.2   RDW 15.4* 15.9* 15.9* 15.2*    170 153 146*     INRNo lab results found in last 7 days.  Data   Results for orders placed or performed during the hospital encounter of 05/29/22 (from the past 24 hour(s))   Glucose by meter   Result Value Ref Range    GLUCOSE BY METER POCT 117 (H) 70 - 99 mg/dL   Glucose by meter   Result Value Ref Range    GLUCOSE BY METER POCT 135 (H) 70 - 99 mg/dL   Glucose by meter   Result Value Ref Range    GLUCOSE BY METER POCT 156 (H) 70 - 99 mg/dL   Glucose by meter   Result Value Ref Range    GLUCOSE BY METER POCT 148 (H) 70 - 99 mg/dL   Basic metabolic panel   Result Value Ref Range    Sodium 140 136 - 145 mmol/L    Potassium 3.4 (L) 3.5 - 5.0 mmol/L    Chloride 107 98 - 107 mmol/L    Carbon Dioxide (CO2) 21 (L) 22 - 31 mmol/L     Anion Gap 12 5 - 18 mmol/L    Urea Nitrogen 18 8 - 28 mg/dL    Creatinine 0.76 0.70 - 1.30 mg/dL    Calcium 7.7 (L) 8.5 - 10.5 mg/dL    Glucose 152 (H) 70 - 125 mg/dL    GFR Estimate >90 >60 mL/min/1.73m2   CBC with platelets   Result Value Ref Range    WBC Count 8.0 4.0 - 11.0 10e3/uL    RBC Count 3.34 (L) 4.40 - 5.90 10e6/uL    Hemoglobin 10.3 (L) 13.3 - 17.7 g/dL    Hematocrit 31.6 (L) 40.0 - 53.0 %    MCV 95 78 - 100 fL    MCH 30.8 26.5 - 33.0 pg    MCHC 32.6 31.5 - 36.5 g/dL    RDW 15.4 (H) 10.0 - 15.0 %    Platelet Count 183 150 - 450 10e3/uL   Glucose by meter   Result Value Ref Range    GLUCOSE BY METER POCT 148 (H) 70 - 99 mg/dL

## 2022-06-03 NOTE — PLAN OF CARE
Goal Outcome Evaluation:    Overall Patient Progress: no change    B/P: 144/92, T: 97.7, P: 70, R: 17    Pt denied any pain and/or nausea.    Continues to be on 5LNC with bubbler and sats in the 90's.    Hemoglobin   Date Value Ref Range Status   06/02/2022 9.7 (L) 13.3 - 17.7 g/dL Final     Uneventful NOC.

## 2022-06-03 NOTE — PROGRESS NOTES
Care Management Follow Up    Length of Stay (days): 4    Expected Discharge Date: 06/04/2022     Concerns to be Addressed:       Patient plan of care discussed at interdisciplinary rounds: Yes    Anticipated Discharge Disposition: Transitional Care      Referrals Placed by CM/LETICIA:    Private pay costs discussed: Not applicable    Additional Information:  LETICIA placed calls to referred TCU facilities to check on bed availability:  - [facility] left  requesting call back  - [facility]- left  requesting call back    LETICIA discussed with MD who reports pt may be planning discharge to home.    LETICIA met with pt and pts brother, Atif, to discuss discharge planning. SW updated pt on TCU referrals at this time. Discussed possible barriers in finding TCU placement pending an unknown cancer treatment plan but discussed that SW can continue to work on this. Pt reports understanding and reports that he was anticipating a discharge to home from the hospital. Discussed safety and pts abilities currently. Pt reports that he feels he can return home alone safely and he has strong support from his son who plans to come stay with him one night a week and his brother who lives very close. Pts brother confirms this information and reports that they will make sure pt has support as they understand he wants to be home. Pt reports plan to work with outpatient palliative care after discharge. Discussed possible home care services for RN/PT/HHA and pt was agreeable to this. He denies other questions for SW at this time.    LETICIA sent initial home care referrals to [facility] and [facility] and will follow.      Ann Li, [facility]      Addendum: LETICIA spoke with [facility] who report being at capacity in pts area. LETICIA left  for [facility] liaison requesting call back about ability to accept the pt.  12:06 PM    SW made referral to [facility]. Liaison reports plan to review and call SW back about  ability to accept.  1:08 PM    SW received call from [facility] who can accept pt. If there is a nursing order care would not start until 6/12 if only PT then they could start 6/7 as long as pt does not have picc line or port. LETICIA confirmed with pts RN that he does not have either. LETICIA spoke with [facility] who report not having capacity to accept. LETICIA sent additional referrals to [facility], [facility], [facility], and [facility].  3:22 PM    LETICIA received acceptance of pt to [facility] for RN/PT/HHA. LETICIA will plan to fax orders when pt discharged.  3:41 PM

## 2022-06-03 NOTE — PLAN OF CARE
Problem: Plan of Care - These are the overarching goals to be used throughout the patient stay.    Goal: Absence of Hospital-Acquired Illness or Injury  Outcome: Ongoing, Progressing     Problem: Risk for Delirium  Goal: Improved Attention and Thought Clarity  Outcome: Ongoing, Progressing   Goal Outcome Evaluation:    Plan of Care Reviewed With: patient     Overall Patient Progress: improving

## 2022-06-04 ENCOUNTER — APPOINTMENT (OUTPATIENT)
Dept: OCCUPATIONAL THERAPY | Facility: HOSPITAL | Age: 72
DRG: 205 | End: 2022-06-04
Payer: MEDICARE

## 2022-06-04 ENCOUNTER — APPOINTMENT (OUTPATIENT)
Dept: PHYSICAL THERAPY | Facility: HOSPITAL | Age: 72
DRG: 205 | End: 2022-06-04
Payer: MEDICARE

## 2022-06-04 LAB
ANION GAP SERPL CALCULATED.3IONS-SCNC: 11 MMOL/L (ref 5–18)
BACTERIA BLD CULT: NO GROWTH
BACTERIA BLD CULT: NO GROWTH
BUN SERPL-MCNC: 27 MG/DL (ref 8–28)
CALCIUM SERPL-MCNC: 8.2 MG/DL (ref 8.5–10.5)
CHLORIDE BLD-SCNC: 108 MMOL/L (ref 98–107)
CO2 SERPL-SCNC: 23 MMOL/L (ref 22–31)
CREAT SERPL-MCNC: 0.79 MG/DL (ref 0.7–1.3)
GFR SERPL CREATININE-BSD FRML MDRD: >90 ML/MIN/1.73M2
GLUCOSE BLD-MCNC: 149 MG/DL (ref 70–125)
GLUCOSE BLDC GLUCOMTR-MCNC: 120 MG/DL (ref 70–99)
GLUCOSE BLDC GLUCOMTR-MCNC: 143 MG/DL (ref 70–99)
GLUCOSE BLDC GLUCOMTR-MCNC: 172 MG/DL (ref 70–99)
GLUCOSE BLDC GLUCOMTR-MCNC: 176 MG/DL (ref 70–99)
HOLD SPECIMEN: NORMAL
POTASSIUM BLD-SCNC: 3.9 MMOL/L (ref 3.5–5)
SODIUM SERPL-SCNC: 142 MMOL/L (ref 136–145)

## 2022-06-04 PROCEDURE — 250N000011 HC RX IP 250 OP 636: Performed by: STUDENT IN AN ORGANIZED HEALTH CARE EDUCATION/TRAINING PROGRAM

## 2022-06-04 PROCEDURE — 120N000001 HC R&B MED SURG/OB

## 2022-06-04 PROCEDURE — 97116 GAIT TRAINING THERAPY: CPT | Mod: GP

## 2022-06-04 PROCEDURE — 250N000013 HC RX MED GY IP 250 OP 250 PS 637: Performed by: INTERNAL MEDICINE

## 2022-06-04 PROCEDURE — 97530 THERAPEUTIC ACTIVITIES: CPT | Mod: GP

## 2022-06-04 PROCEDURE — 250N000013 HC RX MED GY IP 250 OP 250 PS 637: Performed by: STUDENT IN AN ORGANIZED HEALTH CARE EDUCATION/TRAINING PROGRAM

## 2022-06-04 PROCEDURE — 97535 SELF CARE MNGMENT TRAINING: CPT | Mod: GO

## 2022-06-04 PROCEDURE — 250N000011 HC RX IP 250 OP 636: Performed by: INTERNAL MEDICINE

## 2022-06-04 PROCEDURE — 36415 COLL VENOUS BLD VENIPUNCTURE: CPT | Performed by: INTERNAL MEDICINE

## 2022-06-04 PROCEDURE — 999N000157 HC STATISTIC RCP TIME EA 10 MIN

## 2022-06-04 PROCEDURE — 99233 SBSQ HOSP IP/OBS HIGH 50: CPT | Performed by: INTERNAL MEDICINE

## 2022-06-04 PROCEDURE — 82310 ASSAY OF CALCIUM: CPT | Performed by: INTERNAL MEDICINE

## 2022-06-04 PROCEDURE — 94660 CPAP INITIATION&MGMT: CPT

## 2022-06-04 RX ORDER — POTASSIUM CHLORIDE 1500 MG/1
20 TABLET, EXTENDED RELEASE ORAL ONCE
Status: COMPLETED | OUTPATIENT
Start: 2022-06-04 | End: 2022-06-04

## 2022-06-04 RX ADMIN — SENNOSIDES AND DOCUSATE SODIUM 2 TABLET: 8.6; 5 TABLET ORAL at 11:03

## 2022-06-04 RX ADMIN — POTASSIUM CHLORIDE 20 MEQ: 1500 TABLET, EXTENDED RELEASE ORAL at 08:29

## 2022-06-04 RX ADMIN — METHYLPREDNISOLONE SODIUM SUCCINATE 62.5 MG: 125 INJECTION, POWDER, FOR SOLUTION INTRAMUSCULAR; INTRAVENOUS at 08:29

## 2022-06-04 RX ADMIN — POLYETHYLENE GLYCOL 3350 17 GRAM ORAL POWDER PACKET 17 G: at 08:30

## 2022-06-04 RX ADMIN — INSULIN ASPART 1 UNITS: 100 INJECTION, SOLUTION INTRAVENOUS; SUBCUTANEOUS at 12:51

## 2022-06-04 RX ADMIN — INSULIN ASPART 1 UNITS: 100 INJECTION, SOLUTION INTRAVENOUS; SUBCUTANEOUS at 17:06

## 2022-06-04 RX ADMIN — ENOXAPARIN SODIUM 40 MG: 100 INJECTION SUBCUTANEOUS at 10:13

## 2022-06-04 RX ADMIN — METHYLPREDNISOLONE SODIUM SUCCINATE 62.5 MG: 125 INJECTION, POWDER, FOR SOLUTION INTRAMUSCULAR; INTRAVENOUS at 16:50

## 2022-06-04 ASSESSMENT — ACTIVITIES OF DAILY LIVING (ADL)
ADLS_ACUITY_SCORE: 31
ADLS_ACUITY_SCORE: 29
ADLS_ACUITY_SCORE: 31
ADLS_ACUITY_SCORE: 29
ADLS_ACUITY_SCORE: 31

## 2022-06-04 NOTE — PROGRESS NOTES
[facility]    Hospitalist Progress Note    Assessment & Plan   Venkatesh Alvarado is a 71-year-old male with history of prostate cancer with bony metastasis s/p radiation and currently on chemotherapy admitted due to hypotension secondary to fluid depletion, hyponatremia and acute hypoxic respiratory failure after presenting with dizziness and generalized weakness.      Hypotension and hyponatremia resolved with IV hydration. CT showed ground glass opacity in the right upper lung. There was no improvement with antibiotic therapy. As per oncologist and pulmonologist, acute hypoxic respiratory failure is likely secondary to cabazitaxel induced pneumonitis for which steroid therapy was initiated during hospital stay. Palliative care specialist is assisting with establishing goals of care.          Impression:   Principal Problem:    Prostate CA w Bone Mets -- S/P Rad, on Chemo   -- being treated by Dr. Burden with [facility] (and Dr. 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 He is immunosuppressed.  Unclear if cabazitaxel induced pneumonitis is contributing to respiratory failure.   -- O2, Covid negative, Resp Viral panel negative.  Sputum culture showed normal kinza.   -- Duoneb qid and prn   -- Off IV ceftriaxone    -- IV methylprednisolone as per pulmonologist with plan to switch to PO    -- Pulmonology [id number] assistance.    Severe  malnutrition -- lost 18 lb in 6 week  Dietary supplement as dietician        Anemia due to chemotherapy   --Hgb stable    --Hemodynamically stable    --Monitor hgb and transfuse if hgb < 7    --Out patient follow up at the hematology/oncology clinic     Suspected MARLENI   --CPAP at night     Epistaxis    --Resolved       DVT Prophylaxis: SCD  Code Status: No CPR- Do NOT Intubate   Disposition: Expected discharge in 1-2 days     Kim Vela MD     Interval History   No new complaints today.  He states his breathing continues to improved.  Required 3 L of supplemental oxygen overnight to maintain oxygen saturation in the mid 90s.      Physical Exam   Temp: 97.6  F (36.4  C) Temp src: Oral BP: 114/72 Pulse: 62   Resp: 18 SpO2: 93 % O2 Device: Nasal cannula Oxygen Delivery: 3 LPM  Vitals:    05/29/22 2003 05/30/22 1909 06/02/22 1431   Weight: 86.2 kg (190 lb) 89.7 kg (197 lb 12.8 oz) 90.2 kg (198 lb 12.8 oz)     Vital Signs with Ranges  Temp:  [97.5  F (36.4  C)-97.8  F (36.6  C)] 97.6  F (36.4  C)  Pulse:  [62-78] 62  Resp:  [18-19] 18  BP: (110-124)/(72-78) 114/72  SpO2:  [84 %-95 %] 93 %  I/O last 3 completed shifts:  In: 610 [P.O.:480; I.V.:130]  Out: -     # Pain Assessment:  Current Pain Score 6/4/2022   Patient currently in pain? denies   Pain score (0-10) -   Venkatesh s pain level was assessed and he currently denies pain.        Constitutional: Awake, alert, cooperative, no apparent distress  Respiratory: diminished air entry bilaterally, bibasilar rales   Cardiovascular: Regular rate and rhythm, normal S1 and S2, and no murmur noted  GI: Normal bowel sounds, soft, non-distended, non-tender  Extrem: No calf tenderness, no ankle edema  Neuro: Ox3, no focal motor or sensory deficits    Medications       enoxaparin ANTICOAGULANT  40 mg Subcutaneous Q24H     insulin aspart  1-7 Units Subcutaneous TID AC     insulin aspart  1-5 Units Subcutaneous At Bedtime     methylPREDNISolone  62.5 mg Intravenous Q8H      polyethylene glycol  17 g Oral Daily     sodium chloride (PF)  3 mL Intracatheter Q8H       Data   Recent Labs   Lab 06/04/22  0743 06/03/22  2115 06/03/22  1825 06/03/22  1239 06/03/22  1028 06/02/22  1913 06/02/22  1017 06/02/22  1015 06/01/22  1012 05/31/22  0702 05/29/22  2131   WBC  --   --   --   --  8.0  --   --  9.7 12.6*   < > 10.8   HGB  --   --   --   --  10.3*  --   --  9.7* 10.7*   < > 10.9*   MCV  --   --   --   --  95  --   --  95 96   < > 91   PLT  --   --   --   --  183  --   --  170 153   < > 188   NA  --   --   --   --  140  --  139  --  139   < > 130*   POTASSIUM  --   --   --   --  3.4*  --  4.0  --  3.9   < > 3.8   CHLORIDE  --   --   --   --  107  --  110*  --  110*   < > 98   CO2  --   --   --   --  21*  --  21*  --  20*   < > 17*   BUN  --   --   --   --  18  --  12  --  8   < > 14   CR  --   --   --   --  0.76  --  0.73  --  0.74   < > 1.00   ANIONGAP  --   --   --   --  12  --  8  --  9   < > 15   BECCA  --   --   --   --  7.7*  --  7.3*  --  7.4*   < > 8.0*   * 208* 197*   < > 152*   < > 115  --  112   < > 82   ALBUMIN  --   --   --   --   --   --   --   --   --   --  2.9*   PROTTOTAL  --   --   --   --   --   --   --   --   --   --  6.4   BILITOTAL  --   --   --   --   --   --   --   --   --   --  0.8   ALKPHOS  --   --   --   --   --   --   --   --   --   --  68   ALT  --   --   --   --   --   --   --   --   --   --  16   AST  --   --   --   --   --   --   --   --   --   --  34    < > = values in this interval not displayed.       Imaging:   No results found for this or any previous visit (from the past 24 hour(s)).

## 2022-06-04 NOTE — PLAN OF CARE
Goal Outcome Evaluation:  Patient's O2 titrated to maintain sats >90% per MD order with goal (per patient) being 2L or less of flow. Patient regained sats with deep breathing but states that his pulse ox almost immediately would go off when he fell asleep. Pt c/o not getting any sleep overnight due to pulse ox beeping. C/o slight WOB increased above patients PTA baseline.      Problem: Gas Exchange Impaired  Goal: Optimal Gas Exchange  Outcome: Ongoing, Not Progressing

## 2022-06-04 NOTE — PROGRESS NOTES
Pulmonary Progress Note  6/3/2022      Admit Date: 5/29/2022  CODE: No CPR- Do NOT Intubate    Reason for Consult: pulmonary infiltrates, acute respiratory failure with hypoxemia    Assessment/Plan:   71yoM with history of metastatic prostate disease followed by [facility] on chemotherapy presents with mild apical ground glass possibly consistent with viral/atypical infection versus pneumonitis possible due to cabazitaxol.   Improved with starting IV steroids on 6/2. Down to 2Lpm from 5Lpm yesterday.  PCT neg, sputum culture unremarkable.     Recommendations:  - titrate FiO2 for goal SpO2 94-96%, avoid hyperoxia  - encourage OOB, PT/OT, push IS - he is doing really well with this  - discontinue ceftriaxone  - continue IV steroids at 60mg q8h of methylprednisolone today as still on oxygen. If continues to improve can likely transition to PO prednisone tomorrow or monday  - no indication for bronchoscopy at this time.  - will need follow up in either pulm clinic or with [facility], with repeat chest imaging, in 4-6 weeks. Will arrange once closer to discharge  - anticipate he could go home with oxygen if he is not able to rapidly wean over the weekend.    Will continue to follow.     Stoney Rock MD PhD  [facility]    Subjective/Interim Events:   Feeling better.  No much progress on oxygen needs - still at 3L since yesterday  Minimal cough  No hemoptysis.   He is using his IS aggressively and finds if he uses it, it will increase his oxygen and silence the oximeter alarms.  Did well with a walk.    Medications:       enoxaparin ANTICOAGULANT  40 mg Subcutaneous Q24H     insulin aspart  1-7 Units Subcutaneous TID AC     insulin aspart  1-5 Units Subcutaneous At Bedtime     methylPREDNISolone  62.5 mg Intravenous Q8H     polyethylene glycol  17 g Oral Daily     sodium chloride (PF)  3 mL Intracatheter Q8H         Exam/Data:   Vitals  /72 (BP Location: Right arm)   Pulse 62    "Temp 97.6  F (36.4  C) (Oral)   Resp 18   Ht 1.768 m (5' 9.6\")   Wt 90.2 kg (198 lb 12.8 oz)   SpO2 93%   BMI 28.85 kg/m       I/O last 3 completed shifts:  In: 610 [P.O.:480; I.V.:130]  Out: -   Weight change:   [unfilled]  Resp: 18      EXAM:  Physical Exam  Gen: awake, alert, oriented, no distress  HEENT: NT, no RENATA  CV: RRR, no m/g/r  Resp: CTAB  Abd: soft, nontender, BS+  Skin: no rashes or lesions  Ext: no edema  Neuro: PERRL, nonfocal exam    ROS:  A 10-system review was obtained and is negative with the exception of the symptoms noted above.    DATA:    PFT DATAm/a    Sputum cx neg  PCT normal      IMAGING:   CT Chest Pulmonary Embolism w Contrast    Result Date: 5/29/2022  EXAM: CT CHEST PULMONARY EMBOLISM W CONTRAST LOCATION: [facility] DATE/TIME: 5/29/2022 11:27 PM INDICATION: Short of breath COMPARISON: 05/16/2022 TECHNIQUE: CT chest pulmonary angiogram during arterial phase injection of IV contrast. Multiplanar reformats and MIP reconstructions were performed. Dose reduction techniques were used. CONTRAST: 100 mL Isovue 370 FINDINGS: ANGIOGRAM CHEST: Pulmonary arteries are normal caliber and negative for pulmonary emboli. Thoracic aorta is negative for dissection. Mildly aneurysmal ascending thoracic aorta, 4.2 cm. No CT evidence of right heart strain. LUNGS AND PLEURA: Mild mosaic attenuation is again seen. Mild groundglass opacities in the apices. Basilar atelectasis. No significant effusion. MEDIASTINUM/AXILLAE: No adenopathy or significant pericardial effusion. CORONARY ARTERY CALCIFICATION: Moderate. UPPER ABDOMEN: Cholelithiasis. Stones in the cystic duct. No gallbladder distention. Wall thickening of the visualized duodenum not excluded. Slight adjacent stranding. MUSCULOSKELETAL: Degenerative change osseous structures. Sclerotic osseous metastases.     IMPRESSION: 1.  No pulmonary embolism or thoracic aortic dissection. 2.  Mild mosaic attenuation is again seen " which can be seen with air trapping/small airway disease. There are small groundglass opacities in the apices. Pneumonitis not excluded. 3.  Cholelithiasis and stones in the cystic duct. No gallbladder distention. 4.  Wall thickening of the visualized duodenum not excluded. Slight adjacent stranding. 5.  Sclerotic osseous metastases. 6.  Aneurysmal ascending thoracic aorta.

## 2022-06-04 NOTE — PROGRESS NOTES
Progress Note      Interval History:   Patient seen today. He feels good today. He is hoping to be sent home soon. He  Is weaning off supplemental NC oxygen; O2 needs appear to improve with steroids.       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     Metastatic prostate adenocarcinoma with bone mets, Docetaxel refractory, abiraterone refractory  - PSMA PET scan and PSA showed progression.   -On 3/14/2021  he initiated treatment with Cabazitaxel + Prednisone  - He received 4 cycles of cabazitaxel, last given 5/16/22 with Neulasta for neutropenia prevention.   - Continue prednisone 10 mg daily.  - Receives ADT every 6 months and Xgeva barb with Dr. Shabazz at [facility].  - Scheduled for restaging PSMA PET on 6/2/22; likely cancel this until patient is outpatient.   - If there is disease progression, he has 4 treatment options: apalutamide, daralutamide, rechallenge with abiraterone, and Lutetium 177 PSMA therapy.  - Patient wishes to still prolong life but wants to balance it with quality of life; he feels that he no longer wants chemotherapy at this time. I have discussed with him that before he makes a definitive decision, we should get another PET scan to determine treatment response.  - Appreciate Palliative Care.        Acute Respiratory Failure with Hypoxia, secondary to Pneumonia vs Pneumonitis  - Viral panel negative.  - Appreciate Pulmonology.  - Now off ceftriazone and on methylprednisolone with improvement.  - HOLD Cabazitaxel until lungs recover and until performance status improves        Anemia secondary to chemotherapy, expected  - Last received cycle 4 on 5/16/22; dk is usually ~8-14 days.  - Hgb improving now and is 10.3 today.     Generalized Weakness/Fatigue  - Likely from chemotherapy and deconditioning  - Recommend PT/OT  - Malnutrition  "contributing; Appreciate Dietitian.     I have spent 30 minutes with pateint face to face   We will follow peripherally. If there are any questions, please do not hesitate to call.                      Medications:   Scheduled Medications    enoxaparin ANTICOAGULANT  40 mg Subcutaneous Q24H     insulin aspart  1-7 Units Subcutaneous TID AC     insulin aspart  1-5 Units Subcutaneous At Bedtime     methylPREDNISolone  62.5 mg Intravenous Q8H     polyethylene glycol  17 g Oral Daily     sodium chloride (PF)  3 mL Intracatheter Q8H     PRN Medications  acetaminophen, albuterol, calcium carbonate, glucose **OR** dextrose **OR** glucagon, flumazenil, ipratropium - albuterol 0.5 mg/2.5 mg/3 mL, lidocaine 4%, lidocaine (buffered or not buffered), lidocaine (buffered or not buffered), melatonin, midazolam, naloxone **OR** naloxone **OR** naloxone **OR** naloxone, ondansetron **OR** ondansetron, prochlorperazine **OR** prochlorperazine **OR** prochlorperazine, senna-docusate **OR** senna-docusate, sodium chloride, sodium chloride (PF)               Objective:   Vitals were reviewed  Blood pressure 114/72, pulse 62, temperature 97.6  F (36.4  C), temperature source Oral, resp. rate 18, height 1.768 m (5' 9.6\"), weight 90.2 kg (198 lb 12.8 oz), SpO2 93 %.  Wt Readings from Last 4 Encounters:   06/02/22 90.2 kg (198 lb 12.8 oz)   03/23/22 95.7 kg (211 lb)   11/12/21 95.3 kg (210 lb)   10/29/21 95.3 kg (210 lb)       I/O last 3 completed shifts:  In: 610 [P.O.:480; I.V.:130]  Out: -            Data:   All laboratory data and imaging studies reviewed.    CMP  Recent Labs   Lab 06/04/22  0743 06/03/22  2115 06/03/22  1825 06/03/22  1239 06/03/22  1028 06/02/22  1913 06/02/22  1017 06/01/22  1012 05/31/22  1652 05/31/22  0702 05/29/22  2131   NA  --   --   --   --  140  --  139 139  --  137 130*   POTASSIUM  --   --   --   --  3.4*  --  4.0 3.9  --  3.4* 3.8   CHLORIDE  --   --   --   --  107  --  110* 110*  --  109* 98   CO2  --   " --   --   --  21*  --  21* 20*  --  22 17*   ANIONGAP  --   --   --   --  12  --  8 9  --  6 15   * 208* 197* 148* 152*   < > 115 112   < > 150* 82   BUN  --   --   --   --  18  --  12 8  --  7* 14   CR  --   --   --   --  0.76  --  0.73 0.74  --  0.70 1.00   GFRESTIMATED  --   --   --   --  >90  --  >90 >90  --  >90 81   BECCA  --   --   --   --  7.7*  --  7.3* 7.4*  --  6.9* 8.0*   MAG  --   --   --   --   --   --   --   --   --   --  2.1   PROTTOTAL  --   --   --   --   --   --   --   --   --   --  6.4   ALBUMIN  --   --   --   --   --   --   --   --   --   --  2.9*   BILITOTAL  --   --   --   --   --   --   --   --   --   --  0.8   ALKPHOS  --   --   --   --   --   --   --   --   --   --  68   AST  --   --   --   --   --   --   --   --   --   --  34   ALT  --   --   --   --   --   --   --   --   --   --  16    < > = values in this interval not displayed.     CBC  Recent Labs   Lab 06/03/22  1028 06/02/22  1015 06/01/22  1012 05/31/22  0702   WBC 8.0 9.7 12.6* 10.9   RBC 3.34* 3.14* 3.39* 2.75*   HGB 10.3* 9.7* 10.7* 8.6*   HCT 31.6* 29.9* 32.6* 25.9*   MCV 95 95 96 94   MCH 30.8 30.9 31.6 31.3   MCHC 32.6 32.4 32.8 33.2   RDW 15.4* 15.9* 15.9* 15.2*    170 153 146*     INRNo lab results found in last 7 days.  Data   Results for orders placed or performed during the hospital encounter of 05/29/22 (from the past 24 hour(s))   Glucose by meter   Result Value Ref Range    GLUCOSE BY METER POCT 148 (H) 70 - 99 mg/dL   Glucose by meter   Result Value Ref Range    GLUCOSE BY METER POCT 197 (H) 70 - 99 mg/dL   Glucose by meter   Result Value Ref Range    GLUCOSE BY METER POCT 208 (H) 70 - 99 mg/dL   Glucose by meter   Result Value Ref Range    GLUCOSE BY METER POCT 120 (H) 70 - 99 mg/dL

## 2022-06-04 NOTE — PLAN OF CARE
Problem: Hypertension Comorbidity  Goal: Blood Pressure in Desired Range  Outcome: Ongoing, Progressing     Problem: Pain Acute  Goal: Acceptable Pain Control and Functional Ability  Outcome: Ongoing, Progressing   Goal Outcome Evaluation:  Patient alert and orientated.  No c/o pain this shift.  Denies SOB sating 90% on 2L n/c.  No BM but bowel sounds present.  Jorge STANLEY called and ordered miralax.  Given in 240 ml of water and awaiting results.

## 2022-06-05 LAB
GLUCOSE BLDC GLUCOMTR-MCNC: 121 MG/DL (ref 70–99)
GLUCOSE BLDC GLUCOMTR-MCNC: 125 MG/DL (ref 70–99)
GLUCOSE BLDC GLUCOMTR-MCNC: 138 MG/DL (ref 70–99)
GLUCOSE BLDC GLUCOMTR-MCNC: 141 MG/DL (ref 70–99)

## 2022-06-05 PROCEDURE — 250N000011 HC RX IP 250 OP 636: Performed by: INTERNAL MEDICINE

## 2022-06-05 PROCEDURE — 250N000013 HC RX MED GY IP 250 OP 250 PS 637: Performed by: STUDENT IN AN ORGANIZED HEALTH CARE EDUCATION/TRAINING PROGRAM

## 2022-06-05 PROCEDURE — 99232 SBSQ HOSP IP/OBS MODERATE 35: CPT | Performed by: INTERNAL MEDICINE

## 2022-06-05 PROCEDURE — 99233 SBSQ HOSP IP/OBS HIGH 50: CPT | Performed by: NURSE PRACTITIONER

## 2022-06-05 PROCEDURE — 94660 CPAP INITIATION&MGMT: CPT

## 2022-06-05 PROCEDURE — 120N000001 HC R&B MED SURG/OB

## 2022-06-05 PROCEDURE — 250N000012 HC RX MED GY IP 250 OP 636 PS 637: Performed by: NURSE PRACTITIONER

## 2022-06-05 PROCEDURE — 250N000013 HC RX MED GY IP 250 OP 250 PS 637: Performed by: INTERNAL MEDICINE

## 2022-06-05 PROCEDURE — 250N000011 HC RX IP 250 OP 636: Performed by: STUDENT IN AN ORGANIZED HEALTH CARE EDUCATION/TRAINING PROGRAM

## 2022-06-05 PROCEDURE — 999N000157 HC STATISTIC RCP TIME EA 10 MIN

## 2022-06-05 RX ORDER — PANTOPRAZOLE SODIUM 40 MG/1
40 TABLET, DELAYED RELEASE ORAL
Status: DISCONTINUED | OUTPATIENT
Start: 2022-06-06 | End: 2022-06-05

## 2022-06-05 RX ORDER — PREDNISONE 20 MG/1
40 TABLET ORAL 2 TIMES DAILY WITH MEALS
Status: DISCONTINUED | OUTPATIENT
Start: 2022-06-05 | End: 2022-06-06

## 2022-06-05 RX ORDER — PANTOPRAZOLE SODIUM 40 MG/1
40 TABLET, DELAYED RELEASE ORAL
Status: DISCONTINUED | OUTPATIENT
Start: 2022-06-05 | End: 2022-06-07 | Stop reason: HOSPADM

## 2022-06-05 RX ADMIN — METHYLPREDNISOLONE SODIUM SUCCINATE 62.5 MG: 125 INJECTION, POWDER, FOR SOLUTION INTRAMUSCULAR; INTRAVENOUS at 08:52

## 2022-06-05 RX ADMIN — PANTOPRAZOLE SODIUM 40 MG: 40 TABLET, DELAYED RELEASE ORAL at 12:36

## 2022-06-05 RX ADMIN — ENOXAPARIN SODIUM 40 MG: 100 INJECTION SUBCUTANEOUS at 08:55

## 2022-06-05 RX ADMIN — INSULIN ASPART 1 UNITS: 100 INJECTION, SOLUTION INTRAVENOUS; SUBCUTANEOUS at 12:32

## 2022-06-05 RX ADMIN — METHYLPREDNISOLONE SODIUM SUCCINATE 62.5 MG: 125 INJECTION, POWDER, FOR SOLUTION INTRAMUSCULAR; INTRAVENOUS at 01:00

## 2022-06-05 RX ADMIN — POLYETHYLENE GLYCOL 3350 17 GRAM ORAL POWDER PACKET 17 G: at 08:54

## 2022-06-05 RX ADMIN — PREDNISONE 40 MG: 20 TABLET ORAL at 17:38

## 2022-06-05 ASSESSMENT — ACTIVITIES OF DAILY LIVING (ADL)
ADLS_ACUITY_SCORE: 29

## 2022-06-05 NOTE — PLAN OF CARE
[id number]    Problem: Plan of Care - These are the overarching goals to be used throughout the patient stay.    Goal: Optimal Comfort and Wellbeing  Outcome: Ongoing, Progressing     Problem: Pain Acute  Goal: Acceptable Pain Control and Functional Ability  Outcome: Ongoing, Progressing  Intervention: Prevent or Manage Pain  Recent Flowsheet Documentation  Taken 6/4/2022 1903 by Maureen Bautista, RN  Medication Review/Management: medications reviewed  Taken 6/4/2022 1018 by Maureen Bautista RN  Medication Review/Management: medications reviewed   Goal Outcome Evaluation:    Pt alert and oriented, pleasant and cooperative with cares. Stand-by assist with transfers, up walking in halls multiple times. Pt reports mild shortness of breath with activity. Remains on 3 liters nasal cannula, sats > 90%. Using incentive spirometer frequently. Denies pain.

## 2022-06-05 NOTE — PROGRESS NOTES
[facility]    Hospitalist Progress Note    Assessment & Plan   Venkatesh Alvarado is a 71-year-old male with history of prostate cancer with bony metastasis s/p radiation and currently on chemotherapy admitted due to hypotension secondary to fluid depletion, hyponatremia and acute hypoxic respiratory failure after presenting with dizziness and generalized weakness.      Hypotension and hyponatremia resolved with IV hydration. CT showed ground glass opacity in the right upper lung. There was no improvement with antibiotic therapy. As per oncologist and pulmonologist, acute hypoxic respiratory failure is likely secondary to cabazitaxel induced pneumonitis for which steroid therapy was initiated during hospital stay. Palliative care specialist is assisting with establishing goals of care.          Impression:   Principal Problem:    Prostate CA w Bone Mets -- S/P Rad, on Chemo   -- being treated by Dr. Burden with [facility] (and Dr. 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 He is immunosuppressed.  Unclear if cabazitaxel induced pneumonitis is contributing to respiratory failure.   -- O2, Covid negative, Resp Viral panel negative.  Sputum culture showed normal kinza.   -- Duoneb qid and prn   -- Off IV ceftriaxone    -- IV methylprednisolone as per pulmonologist with plan to switch to PO    -- Pulmonology [id number] assistance.    Severe  malnutrition -- lost 18 lb in 6 week  Dietary supplement as dietician        Anemia due to chemotherapy   --Hgb stable    --Hemodynamically stable    --Monitor hgb and transfuse if hgb < 7    --Out patient follow up at the hematology/oncology clinic     Suspected MARLENI   --CPAP at night     Epistaxis    --Resolved       DVT Prophylaxis: SCD  Code Status: No CPR- Do NOT Intubate   Disposition: Expected discharge in 1-2 days     Kim Vela MD     Interval History   No new complaints today.  He states his breathing continues to improve. He was unable to tolerate CPAP last night due to claustrophobia.  O2 requirement down to 2L      Physical Exam   Temp: 97.9  F (36.6  C) Temp src: Oral BP: 111/71 Pulse: 59   Resp: 20 SpO2: 93 % O2 Device: Nasal cannula Oxygen Delivery: 3 LPM  Vitals:    05/29/22 2003 05/30/22 1909 06/02/22 1431   Weight: 86.2 kg (190 lb) 89.7 kg (197 lb 12.8 oz) 90.2 kg (198 lb 12.8 oz)     Vital Signs with Ranges  Temp:  [97.5  F (36.4  C)-97.9  F (36.6  C)] 97.9  F (36.6  C)  Pulse:  [58-72] 59  Resp:  [18-20] 20  BP: (109-130)/(71-75) 111/71  SpO2:  [93 %-94 %] 93 %  I/O last 3 completed shifts:  In: 700 [P.O.:700]  Out: -     # Pain Assessment:  Current Pain Score 6/5/2022   Patient currently in pain? denies   Pain score (0-10) -   Venkatesh s pain level was assessed and he currently denies pain.        Constitutional: Awake, alert, cooperative, no apparent distress  Respiratory: diminished air entry bilaterally, bibasilar rales   Cardiovascular: Regular rate and rhythm, normal S1 and S2, and no murmur noted  GI: Normal bowel sounds, soft, non-distended, non-tender  Extrem: No calf tenderness, no ankle edema  Neuro: Ox3, no focal motor or sensory deficits    Medications       enoxaparin ANTICOAGULANT  40 mg Subcutaneous Q24H     insulin aspart  1-7 Units Subcutaneous TID AC     insulin aspart  1-5 Units Subcutaneous At Bedtime     methylPREDNISolone  62.5 mg Intravenous Q8H     polyethylene  glycol  17 g Oral Daily     sodium chloride (PF)  3 mL Intracatheter Q8H       Data   Recent Labs   Lab 06/05/22  0824 06/04/22  2108 06/04/22  1651 06/04/22  1219 06/04/22  1124 06/03/22  1239 06/03/22  1028 06/02/22  1913 06/02/22  1017 06/02/22  1015 06/01/22  1012 05/31/22  0702 05/29/22  2131   WBC  --   --   --   --   --   --  8.0  --   --  9.7 12.6*   < > 10.8   HGB  --   --   --   --   --   --  10.3*  --   --  9.7* 10.7*   < > 10.9*   MCV  --   --   --   --   --   --  95  --   --  95 96   < > 91   PLT  --   --   --   --   --   --  183  --   --  170 153   < > 188   NA  --   --   --   --  142  --  140  --  139  --  139   < > 130*   POTASSIUM  --   --   --   --  3.9  --  3.4*  --  4.0  --  3.9   < > 3.8   CHLORIDE  --   --   --   --  108*  --  107  --  110*  --  110*   < > 98   CO2  --   --   --   --  23  --  21*  --  21*  --  20*   < > 17*   BUN  --   --   --   --  27  --  18  --  12  --  8   < > 14   CR  --   --   --   --  0.79  --  0.76  --  0.73  --  0.74   < > 1.00   ANIONGAP  --   --   --   --  11  --  12  --  8  --  9   < > 15   BECCA  --   --   --   --  8.2*  --  7.7*  --  7.3*  --  7.4*   < > 8.0*   * 176* 172*   < > 149*   < > 152*   < > 115  --  112   < > 82   ALBUMIN  --   --   --   --   --   --   --   --   --   --   --   --  2.9*   PROTTOTAL  --   --   --   --   --   --   --   --   --   --   --   --  6.4   BILITOTAL  --   --   --   --   --   --   --   --   --   --   --   --  0.8   ALKPHOS  --   --   --   --   --   --   --   --   --   --   --   --  68   ALT  --   --   --   --   --   --   --   --   --   --   --   --  16   AST  --   --   --   --   --   --   --   --   --   --   --   --  34    < > = values in this interval not displayed.       Imaging:   No results found for this or any previous visit (from the past 24 hour(s)).

## 2022-06-05 NOTE — PROGRESS NOTES
"Pulmonary Progress Note  6/3/2022      Admit Date: 5/29/2022  CODE: No CPR- Do NOT Intubate    Reason for Consult: pulmonary infiltrates, acute respiratory failure with hypoxemia    Assessment/Plan:   71yoM with history of metastatic prostate disease followed by [facility] on chemotherapy presents with mild apical ground glass possibly consistent with viral/atypical infection versus pneumonitis possible due to cabazitaxol.   Improved with starting IV steroids on 6/2. Down to 2Lpm from 5Lpm yesterday.  PCT neg, sputum culture unremarkable.     Recommendations:  - titrate FiO2 for goal SpO2 94-96%, avoid hyperoxia; doing well and now down to 1-L.   - encourage OOB, PT/OT, push IS - he is doing really well with this  - Will switch to Prednisone this evening given his overall improvement.   - will need follow up in either pulm clinic or with [facility], with repeat chest imaging, in 4-6 weeks. Will arrange once closer to discharge     Will continue to follow.     Nemo Robles, CNP  [facility] Pulmonary/Critical Care     Subjective/Interim Events:   Feeling better but having a tough time sleeping. Hated the CPAP.   Suggested he try sleeping with HOB elevated, which may help rather than struggle with CPAP.   He is able to walk the hallways and feels great when he does this. Hoping to go home soon.     Medications:       enoxaparin ANTICOAGULANT  40 mg Subcutaneous Q24H     insulin aspart  1-7 Units Subcutaneous TID AC     insulin aspart  1-5 Units Subcutaneous At Bedtime     pantoprazole  40 mg Oral QAM AC     polyethylene glycol  17 g Oral Daily     predniSONE  40 mg Oral BID w/meals     sodium chloride (PF)  3 mL Intracatheter Q8H         Exam/Data:   Vitals  /71 (BP Location: Right arm)   Pulse 59   Temp 97.9  F (36.6  C) (Oral)   Resp 20   Ht 1.768 m (5' 9.6\")   Wt 90.2 kg (198 lb 12.8 oz)   SpO2 94%   BMI 28.85 kg/m       I/O last 3 completed shifts:  In: 700 [P.O.:700]  Out: -   Weight " change:   [unfilled]  Resp: 20      EXAM:  Physical Exam  Gen: awake, alert, oriented, no distress  HEENT: AT/NC  CV: RRR, no m/g/r  Resp: Unlabored on NC; lungs are clear. No wheeze. No cough.   Abd: soft, nontender, BS+  Skin: visible skin intact.   Ext: no edema  Neuro: grossly nonfocal    ROS:  A 10-system review was obtained and is negative with the exception of the symptoms noted above.    DATA:    PFT DATAm/a    Sputum cx neg  PCT normal      IMAGING:   CT Chest Pulmonary Embolism w Contrast    Result Date: 5/29/2022  EXAM: CT CHEST PULMONARY EMBOLISM W CONTRAST LOCATION: [facility] DATE/TIME: 5/29/2022 11:27 PM INDICATION: Short of breath COMPARISON: 05/16/2022 TECHNIQUE: CT chest pulmonary angiogram during arterial phase injection of IV contrast. Multiplanar reformats and MIP reconstructions were performed. Dose reduction techniques were used. CONTRAST: 100 mL Isovue 370 FINDINGS: ANGIOGRAM CHEST: Pulmonary arteries are normal caliber and negative for pulmonary emboli. Thoracic aorta is negative for dissection. Mildly aneurysmal ascending thoracic aorta, 4.2 cm. No CT evidence of right heart strain. LUNGS AND PLEURA: Mild mosaic attenuation is again seen. Mild groundglass opacities in the apices. Basilar atelectasis. No significant effusion. MEDIASTINUM/AXILLAE: No adenopathy or significant pericardial effusion. CORONARY ARTERY CALCIFICATION: Moderate. UPPER ABDOMEN: Cholelithiasis. Stones in the cystic duct. No gallbladder distention. Wall thickening of the visualized duodenum not excluded. Slight adjacent stranding. MUSCULOSKELETAL: Degenerative change osseous structures. Sclerotic osseous metastases.     IMPRESSION: 1.  No pulmonary embolism or thoracic aortic dissection. 2.  Mild mosaic attenuation is again seen which can be seen with air trapping/small airway disease. There are small groundglass opacities in the apices. Pneumonitis not excluded. 3.  Cholelithiasis and stones  in the cystic duct. No gallbladder distention. 4.  Wall thickening of the visualized duodenum not excluded. Slight adjacent stranding. 5.  Sclerotic osseous metastases. 6.  Aneurysmal ascending thoracic aorta.

## 2022-06-06 ENCOUNTER — APPOINTMENT (OUTPATIENT)
Dept: PHYSICAL THERAPY | Facility: HOSPITAL | Age: 72
DRG: 205 | End: 2022-06-06
Payer: MEDICARE

## 2022-06-06 LAB
GLUCOSE BLDC GLUCOMTR-MCNC: 125 MG/DL (ref 70–99)
GLUCOSE BLDC GLUCOMTR-MCNC: 136 MG/DL (ref 70–99)
GLUCOSE BLDC GLUCOMTR-MCNC: 153 MG/DL (ref 70–99)
GLUCOSE BLDC GLUCOMTR-MCNC: 98 MG/DL (ref 70–99)
GLUCOSE BLDC GLUCOMTR-MCNC: 99 MG/DL (ref 70–99)

## 2022-06-06 PROCEDURE — 97110 THERAPEUTIC EXERCISES: CPT | Mod: GP

## 2022-06-06 PROCEDURE — 250N000012 HC RX MED GY IP 250 OP 636 PS 637: Performed by: NURSE PRACTITIONER

## 2022-06-06 PROCEDURE — 99232 SBSQ HOSP IP/OBS MODERATE 35: CPT | Performed by: INTERNAL MEDICINE

## 2022-06-06 PROCEDURE — 250N000011 HC RX IP 250 OP 636: Performed by: STUDENT IN AN ORGANIZED HEALTH CARE EDUCATION/TRAINING PROGRAM

## 2022-06-06 PROCEDURE — 97116 GAIT TRAINING THERAPY: CPT | Mod: GP

## 2022-06-06 PROCEDURE — 250N000013 HC RX MED GY IP 250 OP 250 PS 637: Performed by: INTERNAL MEDICINE

## 2022-06-06 PROCEDURE — 250N000013 HC RX MED GY IP 250 OP 250 PS 637: Performed by: STUDENT IN AN ORGANIZED HEALTH CARE EDUCATION/TRAINING PROGRAM

## 2022-06-06 PROCEDURE — 120N000001 HC R&B MED SURG/OB

## 2022-06-06 RX ORDER — PREDNISONE 20 MG/1
40 TABLET ORAL DAILY
Status: DISCONTINUED | OUTPATIENT
Start: 2022-06-07 | End: 2022-06-07

## 2022-06-06 RX ADMIN — PREDNISONE 40 MG: 20 TABLET ORAL at 09:10

## 2022-06-06 RX ADMIN — POLYETHYLENE GLYCOL 3350 17 GRAM ORAL POWDER PACKET 17 G: at 09:10

## 2022-06-06 RX ADMIN — ENOXAPARIN SODIUM 40 MG: 100 INJECTION SUBCUTANEOUS at 09:10

## 2022-06-06 RX ADMIN — PANTOPRAZOLE SODIUM 40 MG: 40 TABLET, DELAYED RELEASE ORAL at 09:10

## 2022-06-06 RX ADMIN — PREDNISONE 40 MG: 20 TABLET ORAL at 17:43

## 2022-06-06 ASSESSMENT — ACTIVITIES OF DAILY LIVING (ADL)
ADLS_ACUITY_SCORE: 29
ADLS_ACUITY_SCORE: 29
ADLS_ACUITY_SCORE: 28
ADLS_ACUITY_SCORE: 29
ADLS_ACUITY_SCORE: 28
ADLS_ACUITY_SCORE: 29
ADLS_ACUITY_SCORE: 28

## 2022-06-06 NOTE — PROGRESS NOTES
"CLINICAL NUTRITION SERVICES - REASSESSMENT NOTE     Nutrition Prescription  RECOMMENDATIONS FOR MDs/PROVIDERS TO ORDER:      Malnutrition Status:    Severe in acute and chronic illness    Recommendations already ordered by Registered Dietitian (RD):  Continue supplements    Future/Additional Recommendations:  none     EVALUATION OF THE PROGRESS TOWARD GOALS   Diet: Regular  Ensure enlive, Magic cup and special k bar daily  Intake eating 100% at meals, ordering 3 meals/day     NEW FINDINGS   Pt states that he is now eating too much.  Pt likes the supplements and plans to continue with an Ensure type supplement at home.      ANTHROPOMETRICS  Height: 176.8 cm (5' 9.6\")  Admit weight: 190 lb  Most Recent Weight: 90.2 kg (198 lb 12.8 oz)    No new wt since 6/2.    PHYSICAL FINDINGS  See malnutrition section below.    GI CONCERNS  LBM 6/6.     LABS  Labs:  Electrolytes  Potassium (mmol/L)   Date Value   06/04/2022 3.9   06/03/2022 3.4 (L)   06/02/2022 4.0    Blood Glucose  Glucose (mg/dL)   Date Value   06/04/2022 149 (H)   06/03/2022 152 (H)   06/02/2022 115   06/01/2022 112   05/31/2022 150 (H)     GLUCOSE BY METER POCT (mg/dL)   Date Value   06/06/2022 99   06/06/2022 98   06/06/2022 125 (H)   06/05/2022 121 (H)   06/05/2022 138 (H)    Inflammatory Markers  CRP (mg/dL)   Date Value   03/23/2022 1.1 (H)   10/08/2019 2.9 (H)   10/01/2019 5.8 (H)     WBC Count (10e3/uL)   Date Value   06/03/2022 8.0   06/02/2022 9.7   06/01/2022 12.6 (H)     Albumin (g/dL)   Date Value   05/29/2022 2.9 (L)   03/23/2022 3.6   11/13/2021 3.3 (L)      Magnesium (mg/dL)   Date Value   05/29/2022 2.1   03/23/2022 1.9     Sodium (mmol/L)   Date Value   06/04/2022 142   06/03/2022 140   06/02/2022 139    Renal  Urea Nitrogen (mg/dL)   Date Value   06/04/2022 27   06/03/2022 18   06/02/2022 12     Creatinine (mg/dL)   Date Value   06/04/2022 0.79   06/03/2022 0.76   06/02/2022 0.73     Additional  Triglycerides (mg/dL)   Date Value   07/22/2014 " 73   03/09/2010 144     Ketones Urine (mg/dL)   Date Value   05/30/2022 80  (A)        Reviewed    MEDICATIONS    enoxaparin ANTICOAGULANT  40 mg Subcutaneous Q24H     insulin aspart  1-7 Units Subcutaneous TID AC     insulin aspart  1-5 Units Subcutaneous At Bedtime     pantoprazole  40 mg Oral QAM AC     polyethylene glycol  17 g Oral Daily     predniSONE  40 mg Oral BID w/meals     sodium chloride (PF)  3 mL Intracatheter Q8H         acetaminophen, albuterol, calcium carbonate, glucose **OR** dextrose **OR** glucagon, flumazenil, ipratropium - albuterol 0.5 mg/2.5 mg/3 mL, lidocaine 4%, lidocaine (buffered or not buffered), lidocaine (buffered or not buffered), melatonin, naloxone **OR** naloxone **OR** naloxone **OR** naloxone, ondansetron **OR** ondansetron, prochlorperazine **OR** prochlorperazine **OR** prochlorperazine, senna-docusate **OR** senna-docusate, sodium chloride, sodium chloride (PF)   Reviewed    Goals  Patient to consume % of nutritionally adequate meals three times per day, or the equivalent with supplements/snacks. - met  Prevent further weight loss - no new wt.      CURRENT NUTRITION DIAGNOSIS  Malnutrition related to acute illness and chronic illness (prostate cancer with bony mets as evidenced by 6.6% weight loss in two months and prolonged inadequate oral intake of < 75% estimated energy needs> 1 month - improving    INTERVENTIONS  Implementation  Continue supplements as ordered.      Monitoring/Evaluation  Progress toward goals will be monitored and evaluated per protocol.

## 2022-06-06 NOTE — PROGRESS NOTES
Palliative update:    Chart reviewed.  Patient doing well, O2 requirements trending down, denies pain.  Plan to repeat imaging and follow up with [facility] to determine treatment response and discuss future options.  To follow with Franklin Kenny,  Palliative Care NP and LETICIA Rowley for ongoing support.    Our service will sign off.  Please call with any questions or change in clinical status warranting further conversations.      Kole Gomez CNP  Palliative Medicine  [facility]  Pager [phone]  Office [phone]

## 2022-06-06 NOTE — PLAN OF CARE
[id number]    Problem: Plan of Care - These are the overarching goals to be used throughout the patient stay.    Goal: Optimal Comfort and Wellbeing  Outcome: Ongoing, Progressing     Problem: Pain Acute  Goal: Acceptable Pain Control and Functional Ability  Outcome: Ongoing, Progressing  Intervention: Prevent or Manage Pain  Recent Flowsheet Documentation  Taken 6/5/2022 1608 by Maureen Bautista RN  Bowel Elimination Promotion:   adequate fluid intake promoted   ambulation promoted  Medication Review/Management: medications reviewed  Taken 6/5/2022 0859 by Maureen Bautista RN  Medication Review/Management: medications reviewed   Goal Outcome Evaluation:      Pt alert and oriented, pleasant and cooperative with cares. Stand-by assist with transfers, up walking in foster x 1, tolerating well. Oxygen sats > 90% on 1 liter nasal cannula. Denies pain. Ate most of meals.

## 2022-06-06 NOTE — PROGRESS NOTES
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      Hypotension and hyponatremia resolved with IV hydration. CT showed ground glass opacity in the right upper lung. There was no improvement with antibiotic therapy. As per oncologist and pulmonologist, acute hypoxic respiratory failure is likely secondary to cabazitaxel induced pneumonitis for which steroid therapy was initiated during hospital stay. Palliative care specialist is assisting with establishing goals of care.          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 He is immunosuppressed.    -- Rapid COVID-19 test and respiratory pathogen panel-negative.  Sputum culture showed normal kinza.   -- Oxygen requirement is down to 1 L via nasal cannula   -- Duoneb qid and prn   -- Off IV ceftriaxone    -- Received IV methylprednisolone   -- Started oral prednisone 40 mg twice daily on 6/5/2022; prednisone as per pulmonologist   -- Pulmonology [id number] assistance.    Prostate cancer with bone metastasis status post radiation- on chemotherapy   -- being treated by Dr. Burden with [facility] (and Dr. Shabazz)   -- on chemo q3 weeks, due this Thurs 6/2 (he has gotten 4 cycles of current chemo, and lizbeth 6 cycles of original chemo)   --HOLD Cabazitaxel until lungs recover and until performance status improves as per oncology team    --Oncology follow up - appreciate follow up    --Palliative care follow up - appreciate assistance       Hypotension     Dehydration   -- Resolved with IV fluid        Hyponatremia   -- Resolved with IV fluid     Severe malnutrition -- lost 18 lb in 6 week  Dietary supplement as dietician        Anemia due to chemotherapy   --Hgb stable    --Hemodynamically stable    --Monitor hgb and transfuse if hgb < 7    --Out patient follow up at the hematology/oncology clinic     Suspected MARLENI   --CPAP at night     Epistaxis    --Resolved       DVT Prophylaxis: SCD  Code Status: No CPR- Do NOT Intubate   Disposition: Possible discharge in 1 to 2 days; pending pulmonology recommendation    Kim Vela MD     Interval History   No new complaints today and no acute events overnight.  He states he feels better. Oxygen requirement is down to 1 L via nasal cannula.  He has been ambulating on the floor.     Physical Exam   Temp: 97.6  F (36.4  C) Temp src: Oral BP: (!) 141/79 Pulse: 65   Resp: 18 SpO2: 93 % O2 Device: None (Room air) Oxygen Delivery: 1 LPM  Vitals:    05/29/22 2003 05/30/22 1909 06/02/22 1431   Weight: 86.2 kg (190 lb) 89.7 kg (197 lb 12.8 oz) 90.2 kg (198 lb 12.8 oz)     Vital Signs with Ranges  Temp:  [97.6  F (36.4  C)-97.7  F (36.5  C)] 97.6  F (36.4  C)  Pulse:  [61-67] 65  Resp:  [18] 18  BP: (141-169)/(79-91) 141/79  SpO2:  [88 %-94 %] 93 %  I/O last 3 completed shifts:  In: 480 [P.O.:480]  Out: 250 [Urine:250]    # Pain Assessment:  Current Pain Score 6/6/2022   Patient currently in pain? denies   Pain score (0-10) -   Venkatesh s pain level was assessed and he currently denies pain.        Constitutional: Awake, alert, cooperative, no apparent distress  Respiratory: Clear to auscultation bilaterally  Cardiovascular: Regular rate and rhythm, normal S1 and S2, and no murmur noted  GI: Normal bowel sounds, soft, non-distended, non-tender  Extrem: No calf tenderness, no ankle edema  Neuro: Ox3, no focal motor or sensory deficits    Medications       enoxaparin ANTICOAGULANT  40 mg Subcutaneous Q24H     insulin aspart  1-7 Units Subcutaneous TID AC     insulin aspart  1-5  Units Subcutaneous At Bedtime     pantoprazole  40 mg Oral QAM AC     polyethylene glycol  17 g Oral Daily     predniSONE  40 mg Oral BID w/meals     sodium chloride (PF)  3 mL Intracatheter Q8H       Data   Recent Labs   Lab 06/06/22  1219 06/06/22  0830 06/06/22  0200 06/04/22  1219 06/04/22  1124 06/03/22  1239 06/03/22  1028 06/02/22  1913 06/02/22  1017 06/02/22  1015 06/01/22  1012   WBC  --   --   --   --   --   --  8.0  --   --  9.7 12.6*   HGB  --   --   --   --   --   --  10.3*  --   --  9.7* 10.7*   MCV  --   --   --   --   --   --  95  --   --  95 96   PLT  --   --   --   --   --   --  183  --   --  170 153   NA  --   --   --   --  142  --  140  --  139  --  139   POTASSIUM  --   --   --   --  3.9  --  3.4*  --  4.0  --  3.9   CHLORIDE  --   --   --   --  108*  --  107  --  110*  --  110*   CO2  --   --   --   --  23  --  21*  --  21*  --  20*   BUN  --   --   --   --  27  --  18  --  12  --  8   CR  --   --   --   --  0.79  --  0.76  --  0.73  --  0.74   ANIONGAP  --   --   --   --  11  --  12  --  8  --  9   BECCA  --   --   --   --  8.2*  --  7.7*  --  7.3*  --  7.4*   GLC 99 98 125*   < > 149*   < > 152*   < > 115  --  112    < > = values in this interval not displayed.       Imaging:   No results found for this or any previous visit (from the past 24 hour(s)).

## 2022-06-06 NOTE — PLAN OF CARE
Problem: Plan of Care - These are the overarching goals to be used throughout the patient stay.    Goal: Optimal Comfort and Wellbeing  Outcome: Ongoing, Progressing     Problem: Gas Exchange Impaired  Goal: Optimal Gas Exchange  Outcome: Ongoing, Progressing  Intervention: Optimize Oxygenation and Ventilation  Recent Flowsheet Documentation  Taken 6/5/2022 7423 by Spike Sandoval RN  Head of Bed (HOB) Positioning: HOB at 20-30 degrees   Goal Outcome Evaluation:        Pt is A&Ox4 and able to make needs known. Denies of pain or discomfort.     Respiratory rate and rhythm is regular and unlabored, no signs of respiratory distress noted. Some dyspnea on exertion. Pt denies shortness of breath. Pt on 1 L nasal canula.

## 2022-06-06 NOTE — PROGRESS NOTES
Spoke to María Elena from [facility] Verified they have accepted patient. Says patient has not seen PCP in over a year so will need to be seen upon discharge prior to starting home care

## 2022-06-06 NOTE — PLAN OF CARE
Problem: Plan of Care - These are the overarching goals to be used throughout the patient stay.    Goal: Readiness for Transition of Care  Outcome: Ongoing, Progressing  Flowsheets (Taken 6/6/2022 1503)  Concerns to be Addressed: discharge planning  Intervention: Mutually Develop Transition Plan  Recent Flowsheet Documentation  Taken 6/6/2022 1503 by Candie Mancini RN  Concerns to be Addressed: discharge planning   Goal Outcome Evaluation:    Plan of Care Reviewed With: patient     Overall Patient Progress: improving    Outcome Evaluation: able to wean off O2, maintaining o2 saturations >90%. ambulating in room/halls with assist. Awaiting pulmonary visit to figure out dc planning.

## 2022-06-06 NOTE — PLAN OF CARE
"[id number]    Problem: Plan of Care - These are the overarching goals to be used throughout the patient stay.    Goal: Optimal Comfort and Wellbeing  6/5/2022 2250 by Maureen Bautista, RN  Outcome: Ongoing, Progressing  6/5/2022 2247 by Maureen Bautista, RN  Outcome: Ongoing, Progressing     Problem: Gas Exchange Impaired  Goal: Optimal Gas Exchange  Outcome: Ongoing, Progressing   Goal Outcome Evaluation:    Pt reports breathing is \"better\", sats > 90% on 1 liter oxygen via nasal cannula. Using incentive spirometer at bedside. Walking in halls with stand-by assist x 1, tolerating well.  "

## 2022-06-07 VITALS
BODY MASS INDEX: 28.46 KG/M2 | HEIGHT: 70 IN | WEIGHT: 198.8 LBS | OXYGEN SATURATION: 94 % | DIASTOLIC BLOOD PRESSURE: 82 MMHG | RESPIRATION RATE: 18 BRPM | SYSTOLIC BLOOD PRESSURE: 131 MMHG | TEMPERATURE: 97.9 F | HEART RATE: 60 BPM

## 2022-06-07 LAB
GLUCOSE BLDC GLUCOMTR-MCNC: 102 MG/DL (ref 70–99)
GLUCOSE BLDC GLUCOMTR-MCNC: 88 MG/DL (ref 70–99)
SARS-COV-2 RNA RESP QL NAA+PROBE: NEGATIVE

## 2022-06-07 PROCEDURE — 250N000013 HC RX MED GY IP 250 OP 250 PS 637: Performed by: INTERNAL MEDICINE

## 2022-06-07 PROCEDURE — 250N000012 HC RX MED GY IP 250 OP 636 PS 637: Performed by: NURSE PRACTITIONER

## 2022-06-07 PROCEDURE — 250N000013 HC RX MED GY IP 250 OP 250 PS 637: Performed by: STUDENT IN AN ORGANIZED HEALTH CARE EDUCATION/TRAINING PROGRAM

## 2022-06-07 PROCEDURE — 87635 SARS-COV-2 COVID-19 AMP PRB: CPT | Performed by: INTERNAL MEDICINE

## 2022-06-07 PROCEDURE — 99239 HOSP IP/OBS DSCHRG MGMT >30: CPT | Performed by: INTERNAL MEDICINE

## 2022-06-07 PROCEDURE — 99232 SBSQ HOSP IP/OBS MODERATE 35: CPT | Performed by: NURSE PRACTITIONER

## 2022-06-07 PROCEDURE — 250N000011 HC RX IP 250 OP 636: Performed by: STUDENT IN AN ORGANIZED HEALTH CARE EDUCATION/TRAINING PROGRAM

## 2022-06-07 RX ORDER — PREDNISONE 20 MG/1
TABLET ORAL
Qty: 13 TABLET | Refills: 0 | Status: SHIPPED | OUTPATIENT
Start: 2022-06-08 | End: 2022-06-17

## 2022-06-07 RX ORDER — ACETAMINOPHEN 325 MG/1
650 TABLET ORAL EVERY 6 HOURS PRN
COMMUNITY
Start: 2022-06-07

## 2022-06-07 RX ORDER — POLYETHYLENE GLYCOL 3350 17 G/17G
17 POWDER, FOR SOLUTION ORAL DAILY
COMMUNITY
Start: 2022-06-08 | End: 2023-10-21

## 2022-06-07 RX ORDER — PREDNISONE 20 MG/1
20 TABLET ORAL DAILY
Status: DISCONTINUED | OUTPATIENT
Start: 2022-06-12 | End: 2022-06-07 | Stop reason: HOSPADM

## 2022-06-07 RX ORDER — PREDNISONE 20 MG/1
40 TABLET ORAL DAILY
Status: DISCONTINUED | OUTPATIENT
Start: 2022-06-08 | End: 2022-06-07 | Stop reason: HOSPADM

## 2022-06-07 RX ORDER — PANTOPRAZOLE SODIUM 40 MG/1
40 TABLET, DELAYED RELEASE ORAL
Qty: 14 TABLET | Refills: 0 | Status: SHIPPED | OUTPATIENT
Start: 2022-06-08 | End: 2023-10-21

## 2022-06-07 RX ADMIN — PANTOPRAZOLE SODIUM 40 MG: 40 TABLET, DELAYED RELEASE ORAL at 08:29

## 2022-06-07 RX ADMIN — ENOXAPARIN SODIUM 40 MG: 100 INJECTION SUBCUTANEOUS at 08:28

## 2022-06-07 RX ADMIN — POLYETHYLENE GLYCOL 3350 17 GRAM ORAL POWDER PACKET 17 G: at 08:29

## 2022-06-07 RX ADMIN — PREDNISONE 40 MG: 20 TABLET ORAL at 08:29

## 2022-06-07 ASSESSMENT — ACTIVITIES OF DAILY LIVING (ADL)
ADLS_ACUITY_SCORE: 28

## 2022-06-07 NOTE — PROGRESS NOTES
Patient A&Ox4. Denies pain. Endorses slight shortness of breath on initial exertion, although states it resolves when ambulating. Patient reports feeling much stronger and better. Ambulating hallways x1 this afternoon.     Reviewed discharge paperwork, verbalized understanding. Patient left unit with all belongings and medications at 1515.

## 2022-06-07 NOTE — PLAN OF CARE
Occupational Therapy Discharge Summary    Reason for therapy discharge:    Discharged to home.    Progress towards therapy goal(s). See goals on Care Plan in [facility] electronic health record for goal details.  Goals partially met.  Barriers to achieving goals:   discharge from facility.    Therapy recommendation(s):    No further therapy is recommended.

## 2022-06-07 NOTE — DISCHARGE SUMMARY
[facility]  Hospitalist Discharge Summary      Date of Admission:  5/29/2022  Date of Discharge:  6/7/2022  Discharging Provider: Parveen Miller MD  Discharge Service: Hospitalist Service    Summary: 71-year-old male with history of prostate cancer with bony metastasis s/p radiation and currently on chemotherapy admitted due to hypotension secondary to fluid depletion, hyponatremia and acute hypoxic respiratory failure after presenting with dizziness and generalized weakness.       Hypotension and hyponatremia resolved with IV hydration. CT showed ground glass opacity in the right upper lung. There was no improvement with antibiotic therapy. As per oncologist and pulmonologist, acute hypoxic respiratory failure is likely secondary to cabazitaxel induced pneumonitis for which steroid therapy was initiated during hospital stay. Palliative care specialist is assisting with establishing goals of care.     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 He is immunosuppressed.               -- Rapid COVID-19 test and respiratory pathogen panel-negative.  Sputum culture showed normal kinza.              -- Oxygen requirement - tapered to room air              -- Off IV ceftriaxone               -- Discharge on steroid taper              -- Oncology follow-up in 6 weeks   -- repeat chest CT in 5 weeks     Prostate cancer with bone metastasis status post radiation- on chemotherapy              -- being treated by Dr. Burden with [facility] (and Dr. Shabazz)              -- on chemo q3 weeks, due this Thurs 6/2 (he has gotten 4 cycles of current chemo, 6 cycles of original chemo)              --HOLD Cabazitaxel until lungs recover and until performance status improves as per oncology team               --Oncology follow up -  appreciate follow up        Hypotension     Dehydration              -- Resolved with IV fluid        Hyponatremia              -- Resolved with IV fluid      Severe malnutrition -- lost 18 lb in 6 week  Dietary supplement as dietician        Anemia due to chemotherapy              --Hgb stable               --Hemodynamically stable               --Monitor hgb and transfuse if hgb < 7               --Out patient follow up at the hematology/oncology clinic      Suspected MARLENI              --CPAP at night      Epistaxis               --Resolved       Follow-ups Needed After Discharge   Follow-up Appointments     Follow-up and recommended labs and tests       Follow up with primary care provider, Siva Rios MD, within 7 days   to evaluate medication change and for hospital follow- up.  The following   labs/tests are recommended: BMP.  [facility] in 6 weeks         CT chest with IV contrast in 5 weeks    Discharge Disposition   Admited to home care:   Agency: [facility]  Discharged to home  Condition at discharge: Stable    Consultations This Hospital Stay   OCCUPATIONAL THERAPY ADULT IP CONSULT  PHYSICAL THERAPY ADULT IP CONSULT  SOCIAL WORK IP CONSULT  HEMATOLOGY & ONCOLOGY IP CONSULT  NUTRITION SERVICES ADULT IP CONSULT  PALLIATIVE CARE ADULT IP CONSULT  PULMONARY IP CONSULT    Code Status   No CPR- Do NOT Intubate    Time Spent on this Encounter   I, Parveen Miller MD, personally saw the patient today and spent greater than 30 minutes discharging this patient.       Parveen Miller MD  [address] [address]  Phone: [phone]  Fax: [phone]  ______________________________________________________________________    Physical Exam   Vital Signs: Temp: 97.9  F (36.6  C) Temp src: Oral BP: 131/82 Pulse: 60   Resp: 18 SpO2: 94 % O2 Device: None (Room air) Oxygen Delivery: 2 LPM  Weight: 198 lbs 12.8 oz  Constitutional: awake, alert,  cooperative, no apparent distress, and appears stated age  ENT: normocepalic, without obvious abnormality, atramatic  Respiratory: No increased work of breathing, good air exchange, clear to auscultation bilaterally, no crackles or wheezing  Cardiovascular: normal apical pulses  and normal S1 and S2  GI: normal bowel sounds, soft and non-distended  Neurologic: Mental Status Exam:  Level of Alertness:   awake  Orientation:   person, place, time  Memory:   normal  Motor Exam:  moves all extremities well and symmetrically  Sensory:  Sensory intact       Primary Care Physician   Siva Rios MD    Discharge Orders      CT Chest w Contrast     Home Care Referral      Reason for your hospital stay    Shortness of breath     Follow-up and recommended labs and tests     Follow up with primary care provider, Siva Rios MD, within 7 days to evaluate medication change and for hospital follow- up.  The following labs/tests are recommended: BMP.  [facility] in 6 weeks     Activity    Your activity upon discharge: activity as tolerated     Diet    Follow this diet upon discharge: Orders Placed This Encounter      Snacks/Supplements Adult: Ensure Enlive; With Meals      Snacks/Supplements Adult: Magic Cup; With Meals      Snacks/Supplements Adult: Other; Special K bar; Between Meals      Combination Diet Regular Diet Adult       Significant Results and Procedures   Most Recent 3 CBC's:Recent Labs   Lab Test 06/03/22  1028 06/02/22  1015 06/01/22  1012   WBC 8.0 9.7 12.6*   HGB 10.3* 9.7* 10.7*   MCV 95 95 96    170 153     Most Recent 3 BMP's:Recent Labs   Lab Test 06/07/22  1213 06/07/22  0820 06/06/22  2110 06/04/22  1219 06/04/22  1124 06/03/22  1239 06/03/22  1028 06/02/22  1913 06/02/22  1017   NA  --   --   --   --  142  --  140  --  139   POTASSIUM  --   --   --   --  3.9  --  3.4*  --  4.0   CHLORIDE  --   --   --   --  108*  --  107  --  110*   CO2  --   --   --   --  23  --  21*  --  21*   BUN   --   --   --   --  27  --  18  --  12   CR  --   --   --   --  0.79  --  0.76  --  0.73   ANIONGAP  --   --   --   --  11  --  12  --  8   BECCA  --   --   --   --  8.2*  --  7.7*  --  7.3*   GLC 88 102* 153*   < > 149*   < > 152*   < > 115    < > = values in this interval not displayed.     Most Recent 2 LFT's:Recent Labs   Lab Test 05/29/22  2131 03/23/22  1447   AST 34 18   ALT 16 14   ALKPHOS 68 69   BILITOTAL 0.8 0.5     Most Recent 3 Creatinines:Recent Labs   Lab Test 06/04/22  1124 06/03/22  1028 06/02/22  1017   CR 0.79 0.76 0.73     Most Recent 3 Hemoglobins:Recent Labs   Lab Test 06/03/22  1028 06/02/22  1015 06/01/22  1012   HGB 10.3* 9.7* 10.7*   ,   Results for orders placed or performed during the hospital encounter of 05/29/22   CT Chest Pulmonary Embolism w Contrast    Narrative    EXAM: CT CHEST PULMONARY EMBOLISM W CONTRAST  LOCATION: [facility]  DATE/TIME: 5/29/2022 11:27 PM    INDICATION: Short of breath  COMPARISON: 05/16/2022  TECHNIQUE: CT chest pulmonary angiogram during arterial phase injection of IV contrast. Multiplanar reformats and MIP reconstructions were performed. Dose reduction techniques were used.   CONTRAST: 100 mL Isovue 370    FINDINGS:  ANGIOGRAM CHEST: Pulmonary arteries are normal caliber and negative for pulmonary emboli. Thoracic aorta is negative for dissection. Mildly aneurysmal ascending thoracic aorta, 4.2 cm. No CT evidence of right heart strain.    LUNGS AND PLEURA: Mild mosaic attenuation is again seen. Mild groundglass opacities in the apices. Basilar atelectasis. No significant effusion.    MEDIASTINUM/AXILLAE: No adenopathy or significant pericardial effusion.    CORONARY ARTERY CALCIFICATION: Moderate.    UPPER ABDOMEN: Cholelithiasis. Stones in the cystic duct. No gallbladder distention. Wall thickening of the visualized duodenum not excluded. Slight adjacent stranding.    MUSCULOSKELETAL: Degenerative change osseous structures. Sclerotic  osseous metastases.      Impression    IMPRESSION:  1.  No pulmonary embolism or thoracic aortic dissection.  2.  Mild mosaic attenuation is again seen which can be seen with air trapping/small airway disease. There are small groundglass opacities in the apices. Pneumonitis not excluded.  3.  Cholelithiasis and stones in the cystic duct. No gallbladder distention.  4.  Wall thickening of the visualized duodenum not excluded. Slight adjacent stranding.  5.  Sclerotic osseous metastases.  6.  Aneurysmal ascending thoracic aorta.   XR Chest 1 View    Narrative    EXAM: XR CHEST 1 VIEW  LOCATION: [facility]  DATE/TIME: 6/2/2022 8:45 AM    INDICATION: Hypoxic.  COMPARISON: Chest CTA 05/29/2022 and multiple prior studies, chest x-ray 08/30/2019      Impression    IMPRESSION: Fine interstitial opacities are noted in the left perihilar region without any consolidation as of yet. Findings are nonspecific and may reflect a pneumonitis. No effusions. Right lung is clear. Heart and pulmonary vascularity are normal.       Discharge Medications   Current Discharge Medication List      START taking these medications    Details   acetaminophen (TYLENOL) 325 MG tablet Take 2 tablets (650 mg) by mouth every 6 hours as needed for mild pain or fever    Associated Diagnoses: Malignant tumor of prostate (H)      pantoprazole (PROTONIX) 40 MG EC tablet Take 1 tablet (40 mg) by mouth every morning (before breakfast)  Qty: 14 tablet, Refills: 0    Associated Diagnoses: Acute respiratory failure with hypoxia (H)      polyethylene glycol (MIRALAX) 17 g packet Take 17 g by mouth daily    Associated Diagnoses: Malignant tumor of prostate (H)         CONTINUE these medications which have CHANGED    Details   predniSONE (DELTASONE) 20 MG tablet Take 2 tablets (40 mg) by mouth daily for 4 days, THEN 1 tablet (20 mg) daily for 5 days.  Qty: 13 tablet, Refills: 0    Associated Diagnoses: Acute respiratory failure with  hypoxia (H)         CONTINUE these medications which have NOT CHANGED    Details   denosumab (XGEVA) 120 mg/1.7 mL (70 mg/mL) Soln [DENOSUMAB (XGEVA) 120 MG/1.7 ML (70 MG/ML) SOLN] Inject 120 mg under the skin every 30 (thirty) days.       docusate sodium (COLACE) 100 MG capsule [DOCUSATE SODIUM (COLACE) 100 MG CAPSULE] Take 100 mg by mouth 2 (two) times a day as needed for constipation.      leuprolide (ELIGARD) 45 MG kit Inject 45 mg Subcutaneous every 6 months         STOP taking these medications       amLODIPine (NORVASC) 2.5 MG tablet Comments:   Reason for Stopping:             Allergies   No Known Allergies

## 2022-06-07 NOTE — PROGRESS NOTES
"Pulmonary Progress Note  6/3/2022      Admit Date: 5/29/2022  CODE: No CPR- Do NOT Intubate    Reason for Consult: pulmonary infiltrates, acute respiratory failure with hypoxemia    Assessment/Plan:   71yoM with history of metastatic prostate disease followed by [facility] on chemotherapy presents with mild apical ground glass possibly consistent with viral/atypical infection versus pneumonitis possible due to cabazitaxol.   Improved with starting IV steroids on 6/2. Down to 2Lpm from 5Lpm yesterday.  PCT neg, sputum culture unremarkable.     Recommendations:  - Doing well on room air.   - encourage OOB, PT/OT, push IS - he is doing really well with this  - Steroid taper entered.   - will need follow up in either pulm clinic or with [facility], with repeat chest imaging, in 4-6 weeks.   - OK from our standpoint to discharge.         Nemo Robles, [facility] Pulmonary/Critical Care     Subjective/Interim Events:   Feels great; happy with progress. Hoping to discharge today. Walking halls.     Medications:       enoxaparin ANTICOAGULANT  40 mg Subcutaneous Q24H     insulin aspart  1-7 Units Subcutaneous TID AC     insulin aspart  1-5 Units Subcutaneous At Bedtime     pantoprazole  40 mg Oral QAM AC     polyethylene glycol  17 g Oral Daily     [START ON 6/8/2022] predniSONE  40 mg Oral Daily    Followed by     [START ON 6/12/2022] predniSONE  20 mg Oral Daily     sodium chloride (PF)  3 mL Intracatheter Q8H         Exam/Data:   Vitals  /82 (BP Location: Right arm)   Pulse 60   Temp 97.9  F (36.6  C) (Oral)   Resp 18   Ht 1.768 m (5' 9.6\")   Wt 90.2 kg (198 lb 12.8 oz)   SpO2 94%   BMI 28.85 kg/m       I/O last 3 completed shifts:  In: 400 [P.O.:400]  Out: -   Weight change:   [unfilled]  Resp: 18      EXAM:  Physical Exam  Gen: awake, alert, oriented, no distress  HEENT: AT/NC  CV: RRR, no m/g/r  Resp: Unlabored on RA; lungs are clear. No wheeze. No cough.   Abd: soft, nontender, " BS+  Skin: visible skin intact.   Ext: no edema  Neuro: grossly nonfocal    ROS:  A 10-system review was obtained and is negative with the exception of the symptoms noted above.         IMAGING:   CT Chest Pulmonary Embolism w Contrast    Result Date: 5/29/2022  EXAM: CT CHEST PULMONARY EMBOLISM W CONTRAST LOCATION: [facility] DATE/TIME: 5/29/2022 11:27 PM INDICATION: Short of breath COMPARISON: 05/16/2022 TECHNIQUE: CT chest pulmonary angiogram during arterial phase injection of IV contrast. Multiplanar reformats and MIP reconstructions were performed. Dose reduction techniques were used. CONTRAST: 100 mL Isovue 370 FINDINGS: ANGIOGRAM CHEST: Pulmonary arteries are normal caliber and negative for pulmonary emboli. Thoracic aorta is negative for dissection. Mildly aneurysmal ascending thoracic aorta, 4.2 cm. No CT evidence of right heart strain. LUNGS AND PLEURA: Mild mosaic attenuation is again seen. Mild groundglass opacities in the apices. Basilar atelectasis. No significant effusion. MEDIASTINUM/AXILLAE: No adenopathy or significant pericardial effusion. CORONARY ARTERY CALCIFICATION: Moderate. UPPER ABDOMEN: Cholelithiasis. Stones in the cystic duct. No gallbladder distention. Wall thickening of the visualized duodenum not excluded. Slight adjacent stranding. MUSCULOSKELETAL: Degenerative change osseous structures. Sclerotic osseous metastases.     IMPRESSION: 1.  No pulmonary embolism or thoracic aortic dissection. 2.  Mild mosaic attenuation is again seen which can be seen with air trapping/small airway disease. There are small groundglass opacities in the apices. Pneumonitis not excluded. 3.  Cholelithiasis and stones in the cystic duct. No gallbladder distention. 4.  Wall thickening of the visualized duodenum not excluded. Slight adjacent stranding. 5.  Sclerotic osseous metastases. 6.  Aneurysmal ascending thoracic aorta.

## 2022-06-07 NOTE — PLAN OF CARE
Problem: Pain Acute  Goal: Acceptable Pain Control and Functional Ability  Outcome: Ongoing, Progressing  Intervention: Prevent or Manage Pain  Recent Flowsheet Documentation  Taken 6/7/2022 1141 by Bret Ramirez RN  Medication Review/Management: medications reviewed     Problem: Gas Exchange Impaired  Goal: Optimal Gas Exchange  Outcome: Ongoing, Progressing     Problem: Anemia  Goal: Anemia Symptom Improvement  Outcome: Ongoing, Progressing  Intervention: Monitor and Manage Anemia  Recent Flowsheet Documentation  Taken 6/7/2022 1141 by Bret Ramirez RN  Safety Promotion/Fall Prevention: activity supervised   Goal Outcome Evaluation:        Patient denies pain at this time, up and ambulated in th foster with cane, gait steady, no oxygen needed at rest. Appetite is good on regular diet, makes needs known appropriately. He would like to discharge to home. IS use encouraged and done. Will continue to monitor    Bret Ramirez RN

## 2022-06-07 NOTE — PLAN OF CARE
Goal Outcome Evaluation:    Problem: Plan of Care - These are the overarching goals to be used throughout the patient stay.    Goal: Plan of Care Review/Shift Note  Outcome: Ongoing, Progressing     Pt is experiencing new incontinence/dripping since stay. States it has been mentioned to MD, cause undetermined.     Problem: Gas Exchange Impaired  Goal: Optimal Gas Exchange  Outcome: Ongoing, Progressing    Lung sounds diminished. 2L O2, nasal cannula with humidification. Dyspnea on exertion. Denies SOB at rest.     Problem: Pain Acute  Goal: Acceptable Pain Control and Functional Ability  Outcome: Ongoing, Progressing  Denied pain throughout shift.     Chris Dominguez RN

## 2022-06-07 NOTE — PLAN OF CARE
Physical Therapy Discharge Summary    Reason for therapy discharge:    Discharged to home with home therapy.    Progress towards therapy goal(s). See goals on Care Plan in [facility] electronic health record for goal details.  Goals partially met.  Barriers to achieving goals:   discharge from facility.    Therapy recommendation(s):    Recommend continued therapies to improve overall strength, balance and mobility.     Trudy Lou, PT, DPT  6/7/2022

## 2022-06-07 NOTE — PROGRESS NOTES
Care Management Discharge Note    Discharge Date: 06/07/2022       Discharge Disposition: Home Care    Discharge Services:      Discharge DME:      Discharge Transportation: agency    Private pay costs discussed: Not applicable    PAS Confirmation Code:    Patient/family educated on Medicare website which has current facility and service quality ratings:      Education Provided on the Discharge Plan:    Persons Notified of Discharge Plans: patient   Patient/Family in Agreement with the Plan: yes    Handoff Referral Completed: Yes    Additional Information:  Discharge orders faxed to [facility] via [employer]. Patient is aware he needs to see his PCP before home care starts.         Candie Frausto RN

## 2022-06-07 NOTE — DISCHARGE INSTRUCTIONS
Home care services have been arranged for you.  Agency: [facility]  Services: RN, PT, OT   Phone: [phone]  Instructions: Home Care will contact you within 24 hours to arrange the first visit. Need to see your PCP before home care can start

## 2022-06-07 NOTE — PLAN OF CARE
Problem: Plan of Care - These are the overarching goals to be used throughout the patient stay.    Goal: Absence of Hospital-Acquired Illness or Injury  Intervention: Identify and Manage Fall Risk  Recent Flowsheet Documentation  Taken 6/6/2022 1700 by Adriana Rizzo, RN  Safety Promotion/Fall Prevention:    activity supervised    assistive device/personal items within reach    fall prevention program maintained    clutter free environment maintained    patient and family education    nonskid shoes/slippers when out of bed    safety round/check completed    supervised activity    toileting scheduled  Pt standby assist to the bathroom with walker.  No falls or safety incidents during this shift.      Problem: Pain Acute  Goal: Acceptable Pain Control and Functional Ability  Outcome: Ongoing, Progressing  Intervention: Prevent or Manage Pain  Recent Flowsheet Documentation  Taken 6/6/2022 1700 by Adriana Rizzo RN  Medication Review/Management: medications reviewed  Pt denied pain during this shift.    O2 sat 93-94% on RA. Pt desats with activity, 84% but recovers quickly with rest. Pt denies shortness of breath at rest. , 153. Ate 100% of dinner.  Pt is resting quietly.    Goal Outcome Evaluation:

## 2022-06-08 ENCOUNTER — PATIENT OUTREACH (OUTPATIENT)
Dept: CARE COORDINATION | Facility: CLINIC | Age: 72
End: 2022-06-08
Payer: MEDICARE

## 2022-06-08 ENCOUNTER — TELEPHONE (OUTPATIENT)
Dept: FAMILY MEDICINE | Facility: CLINIC | Age: 72
End: 2022-06-08
Payer: MEDICARE

## 2022-06-08 DIAGNOSIS — Z71.89 OTHER SPECIFIED COUNSELING: ICD-10-CM

## 2022-06-08 NOTE — PROGRESS NOTES
[facility]: Post-Discharge Note  SITUATION                                                      Admission:    Admission Date: 05/29/22   Reason for Admission: hypotension secondary to fluid depletion, hyponatremia and acute hypoxic respiratory failure after presenting with dizziness and generalized weakness  Discharge:   Discharge Date: 06/07/22  Discharge Diagnosis: Acute respiratory failure with hypoxia    BACKGROUND                                                      Per hospital discharge summary and inpatient provider notes:   71-year-old male with history of prostate cancer with bony metastasis s/p radiation and currently on chemotherapy admitted due to hypotension secondary to fluid depletion, hyponatremia and acute hypoxic respiratory failure after presenting with dizziness and generalized weakness.       Hypotension and hyponatremia resolved with IV hydration. CT showed ground glass opacity in the right upper lung. There was no improvement with antibiotic therapy. As per oncologist and pulmonologist, acute hypoxic respiratory failure is likely secondary to cabazitaxel induced pneumonitis for which steroid therapy was initiated during hospital stay. Palliative care specialist is assisting with establishing goals of care.      Breathing improved and discharged on steroid taper       ASSESSMENT      Enrollment  Primary Care Care Coordination Status: Declined    Discharge Assessment  How are you doing now that you are home?: Patient said that he feels okay, just moving slow.  How are your symptoms? (Red Flag symptoms escalate to triage hotline per guidelines): Improved  Do you feel your condition is stable enough to be safe at home until your provider visit?: Yes  Does the patient have their discharge instructions? : Yes  Does the patient have questions regarding their discharge instructions? : No  Were you started on any new medications or were there changes to any of your previous medications? :  Yes  Does the patient have all of their medications?: Yes  Do you have questions regarding any of your medications? : No  Do you have all of your needed medical supplies or equipment (DME)?  (i.e. oxygen tank, CPAP, cane, etc.): Yes  Discharge follow-up appointment scheduled within 14 calendar days? : Yes  Discharge Follow Up Appointment Date: 06/17/22  Discharge Follow Up Appointment Scheduled with?: Primary Care Provider    Post-op ([facility] CTA Only)  If the patient had a surgery or procedure, do they have any questions for a nurse?: No      PLAN                                                      Outpatient Plan:     Follow up with primary care provider, Siva Rios MD, within 7 days to evaluate medication change and for hospital follow- up.  The following labs/tests are recommended: BMP.  [facility] in 6 weeks         Future Appointments   Date Time Provider Department Center   6/17/2022 11:30 AM Siva Rios MD [facility]         For any urgent concerns, please contact our 24 hour nurse triage line: [phone] ([phone])         Ivania Rosario [facility]  [phone]  [facility]

## 2022-06-08 NOTE — TELEPHONE ENCOUNTER
Reason for Call: Request for an order or referral:    Order or referral being requested: Pt was discharged from [facility] yesterday 6/6/22 and has INF with PCP on 6/17/22.      Pt is requesting an order for HC for PT.  Can PCP order please or does PCP need to wait to see pt?  Thanks    Date needed: as soon as possible    Has the patient been seen by the PCP for this problem? unknown    Phone number Patient can be reached at:  Home number on file [phone] (home)    Best Time:  anytime    Can we leave a detailed message on this number?  YES    Call taken on 6/8/2022 at 11:10 AM by Belinda Berumen CMA,TC

## 2022-06-10 NOTE — TELEPHONE ENCOUNTER
Reason for Call: Request for an order or referral:    Order or referral being requested: HC    DELAY OF CARE FOR NRSG, PT, AND HHA TILL 6/12/22.    Does PCP approve this delay of care?  Also see message below.  Thanks    Date needed: as soon as possible    Has the patient been seen by the PCP for this problem? YES    Phone number David can be reached at: [phone]    Best Time:  Daytime    Can we leave a detailed message on this number?  YES    Call taken on 6/10/2022 at 9:11 AM by Belinda Berumen CMA, TC

## 2022-06-10 NOTE — TELEPHONE ENCOUNTER
Spoke with Dr. Rios and he gives verbal approval for delay of care for nrsg, PT, and HHA till 06/12/22.  Can disregard previous message in this encounter.  Called David to relay above. Thanks

## 2022-06-12 ENCOUNTER — MEDICAL CORRESPONDENCE (OUTPATIENT)
Dept: HEALTH INFORMATION MANAGEMENT | Facility: CLINIC | Age: 72
End: 2022-06-12

## 2022-06-13 ENCOUNTER — TELEPHONE (OUTPATIENT)
Dept: FAMILY MEDICINE | Facility: CLINIC | Age: 72
End: 2022-06-13
Payer: MEDICARE

## 2022-06-13 NOTE — TELEPHONE ENCOUNTER
Reason for Call:  Home Health Care    Deborah with [facility] called regarding (reason for call): verbal orders    Orders are needed for this patient.     PT: 1 Time a week for 1 week with start date of     OT: 1 Time a week for 1 week start date of     Skilled Nursin Time a week for 3 weeks    Other: Okay to provide shower assistance    Note out of Limit BP.    Phone Number Homecare Nurse can be reached at: [phone]    Can we leave a detailed message on this number? YES    Phone number patient can be reached at: Home number on file [phone] (home)    Best Time: ANY    Call taken on 2022 at 8:05 AM by Suhas Milner

## 2022-06-14 ENCOUNTER — TELEPHONE (OUTPATIENT)
Dept: NURSING | Facility: CLINIC | Age: 72
End: 2022-06-14
Payer: MEDICARE

## 2022-06-14 NOTE — TELEPHONE ENCOUNTER
Ruddy is calling with [facility] ([phone]).  Ruddy is a physical therpist.  Ruddy is requesting six additional therapy sessions and are to work on walking training and therapeutic exercise.     COVID 19 Nurse Triage Plan/Patient Instructions    Please be aware that novel coronavirus (COVID-19) may be circulating in the community. If you develop symptoms such as fever, cough, or SOB or if you have concerns about the presence of another infection including coronavirus (COVID-19), please contact your health care provider or visit [email or web address].     Disposition/Instructions    Home care recommended. Follow home care protocol based instructions.    Thank you for taking steps to prevent the spread of this virus.  o Limit your contact with others.  o Wear a simple mask to cover your cough.  o Wash your hands well and often.    [facility]: About COVID-19: [email or web address]    CDC: What to Do If You're Sick: www.cdc.gov/coronavirus/2019-ncov/about/steps-when-sick.html    CDC: Ending Home Isolation: www.cdc.gov/coronavirus/2019-ncov/hcp/disposition-in-home-patients.html     CDC: Caring for Someone: www.cdc.gov/coronavirus/2019-ncov/if-you-are-sick/care-for-someone.html     [facility]: Interim Guidance for Hospital Discharge to Home: www.health.[facility].[place]/diseases/coronavirus/hcp/hospdischarge.pdf    [facility] clinical trials (COVID-19 research studies): clinicalaffairs.[facility]-clinical-trials     Below are the COVID-19 hotlines at the [facility] ([facility]). Interpreters are available.   o For health questions: Call 940-456-4760 or 1-952.631.3928 (7 a.m. to 7 p.m.)  o For questions about schools and childcare: Call 605-840-1162 or 1-760.627.3685 (7 a.m. to 7 p.m.)

## 2022-06-17 ENCOUNTER — VIRTUAL VISIT (OUTPATIENT)
Dept: FAMILY MEDICINE | Facility: CLINIC | Age: 72
End: 2022-06-17
Payer: MEDICARE

## 2022-06-17 DIAGNOSIS — E43 SEVERE MALNUTRITION (H): ICD-10-CM

## 2022-06-17 DIAGNOSIS — D35.2 PITUITARY ADENOMA (H): ICD-10-CM

## 2022-06-17 DIAGNOSIS — C61 MALIGNANT TUMOR OF PROSTATE (H): ICD-10-CM

## 2022-06-17 DIAGNOSIS — J96.01 ACUTE RESPIRATORY FAILURE WITH HYPOXIA (H): Primary | ICD-10-CM

## 2022-06-17 PROCEDURE — 99213 OFFICE O/P EST LOW 20 MIN: CPT | Mod: TEL | Performed by: FAMILY MEDICINE

## 2022-06-17 NOTE — PROGRESS NOTES
"Venkatesh is a 71 year old who is being evaluated via a billable telephone visit.      What phone number would you like to be contacted at? [phone]  How would you like to obtain your AVS? Mail a copy    Assessment & Plan     Acute respiratory failure with hypoxia (H)      Prostate CA w Bone Mets -- S/P Rad, on Chemo      Severe malnutrition (H)      Pituitary adenoma (H)                 BMI:   Estimated body mass index is 28.85 kg/m  as calculated from the following:    Height as of 5/30/22: 1.768 m (5' 9.6\").    Weight as of 6/2/22: 90.2 kg (198 lb 12.8 oz).           Return in about 6 months (around 12/17/2022) for Follow up.    Siva Rios MD, MD  [facility]    Nicol Riddle is a 71 year old, presenting for the following health issues:  No chief complaint on file.      HPI     Pneumonitis due to chemo.    Urinary incontinence at night.  Uses Depends.  Doing Kegel exercises.    On protonix for 14 days.    Not on CPAP.    Current Outpatient Medications   Medication Sig Dispense Refill     acetaminophen (TYLENOL) 325 MG tablet Take 2 tablets (650 mg) by mouth every 6 hours as needed for mild pain or fever       denosumab (XGEVA) 120 mg/1.7 mL (70 mg/mL) Soln [DENOSUMAB (XGEVA) 120 MG/1.7 ML (70 MG/ML) SOLN] Inject 120 mg under the skin every 30 (thirty) days.        docusate sodium (COLACE) 100 MG capsule [DOCUSATE SODIUM (COLACE) 100 MG CAPSULE] Take 100 mg by mouth 2 (two) times a day as needed for constipation.       leuprolide (ELIGARD) 45 MG kit Inject 45 mg Subcutaneous every 6 months       pantoprazole (PROTONIX) 40 MG EC tablet Take 1 tablet (40 mg) by mouth every morning (before breakfast) 14 tablet 0     polyethylene glycol (MIRALAX) 17 g packet Take 17 g by mouth daily           Review of Systems   No fever or cough.      Objective           Vitals:  No vitals were obtained today due to virtual visit.    Physical Exam     PSYCH: Alert and oriented times 3; coherent " speech, normal   rate and volume, able to articulate logical thoughts, able   to abstract reason, no tangential thoughts, no hallucinations   or delusions  His affect is normal  RESP: No cough, no audible wheezing, able to talk in full sentences  Remainder of exam unable to be completed due to telephone visits      Hospital Follow-up Visit:    Hospital/Nursing Home/IP Rehab Facility: [facility]  Date of Admission: 5/29/22  Date of Discharge: 6/7/22  Reason(s) for Admission: Respiratory failure    Was your hospitalization related to COVID-19? No   Problems taking medications regularly:  None  Medication changes since discharge: None  Problems adhering to non-medication therapy:  None    Summary of hospitalization:  [facility] discharge summary reviewed  Diagnostic Tests/Treatments reviewed.  Follow up needed: none  Other Healthcare Providers Involved in Patient s Care:         Homecare and Specialist appointment - Oncology 6/27   PET on 6/24    Update since discharge: improved.       Post Discharge Medication Reconciliation: discharge medications reconciled, continue medications without change.  Plan of care communicated with patient                       Phone call duration: 23 minutes    .  ..

## 2022-06-22 ENCOUNTER — TRANSFERRED RECORDS (OUTPATIENT)
Dept: HEALTH INFORMATION MANAGEMENT | Facility: CLINIC | Age: 72
End: 2022-06-22

## 2022-06-23 ENCOUNTER — VIRTUAL VISIT (OUTPATIENT)
Dept: ONCOLOGY | Facility: CLINIC | Age: 72
End: 2022-06-23
Attending: SOCIAL WORKER
Payer: MEDICARE

## 2022-06-23 DIAGNOSIS — C61 MALIGNANT NEOPLASM OF PROSTATE (H): ICD-10-CM

## 2022-06-23 PROCEDURE — 99207 PR NO BILLABLE SERVICE THIS VISIT: CPT | Mod: 95 | Performed by: SOCIAL WORKER

## 2022-06-23 NOTE — PROGRESS NOTES
Telemedicine Visit: The patient's condition can be safely assessed and treated via synchronous audio and visual telemedicine encounter.      Reason for Telemedicine Visit: covid19 precautions     Originating Site (Patient Location): Patient's home    Distant Site (Provider Location): [facility]: palliative care clinic     Consent:  The patient/guardian has verbally consented to: the potential risks and benefits of telemedicine (video visit) versus in person care; bill my insurance or make self-payment for services provided; and responsibility for payment of non-covered services.     Mode of Communication:  Video Conference via [employer] at start, then switched to phone     As the provider I attest to compliance with applicable laws and regulations related to telemedicine.    Palliative Care Counseling Services - Initial Assessment    PLEASE NOTE:  THIS IS A MENTAL HEALTH NOTE.  OTHER PROVIDERS VIEWING THIS NOTE SHOULD USE THIS ONLY FOR UNDERSTANDING THE CONTEXT OF THE PATIENT S EXPERIENCE.  TOPICS DESCRIBED IN THIS NOTE SHOULD NOT BE REFERENCED TO THE PATIENT BY MEDICAL PROVIDERS      Venkatesh Alvarado is a 71 year old man with diagnosis of prostate cancer with bony mets, seen today for initial palliative care counseling assessment via [employer] video.  HAd to switch to phone part way through due to poor internet connection.    Referred by: He was not sure who had referred him.  Seems he was seen by palliative care team during a recent hospitalization and follow up with palliative was recommended.  He is followed by [facility] and so an appointment was made with the Palliative NP there, but he cancelled the appointment the day of due to not feeling it was necessary / not knowing why it had been made.     Presenting Issues: Coping with illness    Preferred Name: Venkatesh    Mental Status Exam: (List all that apply)      Appearance: Appropriate      Eye Contact: Good       Orientation: Yes, x4      Mood:  "Normal      Affect: Appropriate      Thought Content: Clear         Thought Form: Logical      Psychomotor Behavior: Normal    Family:       Marital status:     Years : not asked    Years together: not asked     Name of spouse/partner: not asked      Children: Son Tim lives about 10 minutes away, helps Venkatesh with meals, transport to appointments.  Venkatesh describes him as very supportive.        Parents: not asked      Siblings: has a brother \"he comes and goes\".  Reports that his brother wanted Venkatesh to move into an assisted living apartment, which Venkatesh has resisted as he reports he is still able to take care of most ADL's independently and likes where he lives at this time. Feels his brother has stopped speaking to him due to this.       Other:     Support system: Family    Living situation: House   Difficulty accessing and/or getting around living space: no   Other concerns: no     Employment history:      Current employment status:  Retired         Kind of work:        Spouses/SO current employment status:       Kind of work:     Education highest level: not asked     Financial:       Descriptor: not asked       Health insurance:     Legal concerns: not asked        Area(s) of concern:     Health Care Directive: Has one:  no       If yes, copy in EMR: No       Basic information regarding health care directive provided: no        Health Care Agent(s): No health care directive:  Surrogate  health care decision maker is per legal succession  POLST?     Medical History/Issues (patient account): Diagnosed in October 2019 treated with \"numerous drugs\" also went through treatment of \"taking red blood cells, putting them back and then putting the white cells back a week later to help my immune system\", chemotherapy, radiation to back for pain.  Hospitalized last month \"not sure if it was because of pneumonia or the chemo\".  SInce back, is not back to his current baseline and the most " "notable new symptom has been urinary incontinence.  He had a catheter placed yesterday and reports that he is now feeling much better.     Venkatesh shared he understands that his cancer is slow growing.  He wonders if the burdens of treatment justify the benefits.  He has a PET scan tomorrow and an appointment with his oncologist on Monday to discuss the results.  I encouraged him to talk to his oncologist about these questions.  He shared his family has told him they will support any choice he makes.       Pain/Discomfort Issues: incontinence, fatigue, low endurance     Coping: struggling with trying to figure out if this is his new baselien \"things keep happening and they have to keep adding things [to manage medical events]\" Shared he is \"pretty sure this is what it's going to be like until the end\".  Misses bowling and golfing.  Still tries to walk as much as he can but cannot do the 3 mile walk that he used to take daily. Enjoys seeing his son.     \"Sometimes my mood can be pretty rotten, but it's been better since the catheter was placed\".  He does not endorse persistently low mood, though I did not get to do a full depression screen.  Feels his mood fluctuates in relation to how he is feeling physically.  He is also preparing for another scan and is generally trying to get a sense of what to expect regarding his cancer and his health going forward.    Difficult to sit with the many unknowns of his situation  Regarding decision to continue with active treatment or not, uncertainty of prognosis based on these decisions, wonder if this is his new baseline, uncertainty over what to expect in terms of symptom burden / symptom management      Sleep: not asked     Sexual Health/Intimacy: not asked     Mental Health History and Current Review of Symptoms (patient account):     Much of today's visit was spent providing education about palliative care and our role in his care as he is not currently followed by a " palliative care medical provider. Did not get to do a formal depression screen     Depression in past?:     Treatment in past?:     Treatment currently?:      Depression symptoms currently?:  - Anhedonia:    - Hopeless or down mood:    - Sleep problems (too much or too little):    - Fatigue:    - Appetite (too much or too little):    - Excessively negative self perception:    - Trouble concentrating:    - Motor slowness:    - Current and/or recent thoughts of suicide:      Suicide risk screen:  - Past thoughts of suicide?    - Past attempts to end own life?    - If yes, how?   - Protective factors currently?   - Safety plan needed currently?     Anxiety in past?:    Treatment in past?     Treatment currently?       Anxiety symptoms currently?  - Nervous, on edge:    - Sleep problems:    - Worrying a lot/hard to manage worries:    Specific recent areas of worry/fear/concern:   - Tense, hard to relax:    - Feeling restless, hard to sit still:    - Irritable:    - Feeling of dread/ afraid that something awful may happen:    - How long?   - Impacts on daily life?             Grief vs Depression:  Endorses symptoms for:     Psychological Trauma and/or Major Losses:   Not asked     Nightmares?      Thought about when not wanting to think about?   Tried hard not to think about it?   Avoided situations that reminded you of it?   Felt constantly on guard, watchful, or easily startled?   Felt numb or detached from others, activities, or your surroundings?     Safety Screen:  History of being harmed or controlled by someone close to you?    Being hurt or controlled by someone close to you?    Worried will be harmed in future?    Worried will harm someone else in future?      CD History:   Using alcohol actively?     Amount per week:   Current concerns (self or other) about alcohol or drug use?   Past concerns and/or CD treatment?         /Spirituality:       Belong to a  community:      Specify:        Identify with  a particular Jew:       Identify as spiritual:       How find expression:     Hope:      What do you hope for:          What gives you hope:         Internal Resources (positive memories, sources of salvador):     Perceived Needs: Venkatesh was not sure if my services would be helpful at this point in time.      Resource needs/Referrals: None     Will complete diagnosis once assessment complete in future visit.               Intervention: Initial palliative care counseling / clinical social work evaluation was conducted.  Palliative Care Counseling interventions available were discussed, including counseling related to serious illness, behavioral interventions for symptom management, consultation regarding goals of care/health care directive/POLST, and other interventions specific to the patient's situation or concerns.     Plan: will follow up as needed.     DSM5 Diagnoses:   deferred    Time Spent with Patient/Family: 50 minutes  (Start 12:30, end 1:20)    CAT Ruelas, [facility]   Palliative Care    Pgr:[phone]  Ph: [phone]      DO NOT SEND ANY LETTERS

## 2022-06-23 NOTE — LETTER
6/23/2022         RE: Venkatesh Alvarado  [address] [facility] [address]        Dear Colleague,    Thank you for referring your patient, Venkatesh Alvarado, to the [facility] [place]. Please see a copy of my visit note below.      Telemedicine Visit: The patient's condition can be safely assessed and treated via synchronous audio and visual telemedicine encounter.      Reason for Telemedicine Visit: covid19 precautions     Originating Site (Patient Location): Patient's home    Distant Site (Provider Location): [facility]: palliative care clinic     Consent:  The patient/guardian has verbally consented to: the potential risks and benefits of telemedicine (video visit) versus in person care; bill my insurance or make self-payment for services provided; and responsibility for payment of non-covered services.     Mode of Communication:  Video Conference via "[employer] at start, then switched to phone     As the provider I attest to compliance with applicable laws and regulations related to telemedicine.    Palliative Care Counseling Services - Initial Assessment    PLEASE NOTE:  THIS IS A MENTAL HEALTH NOTE.  OTHER PROVIDERS VIEWING THIS NOTE SHOULD USE THIS ONLY FOR UNDERSTANDING THE CONTEXT OF THE PATIENT S EXPERIENCE.  TOPICS DESCRIBED IN THIS NOTE SHOULD NOT BE REFERENCED TO THE PATIENT BY MEDICAL PROVIDERS      Venkatesh Alvarado is a 71 year old man with diagnosis of prostate cancer with bony mets, seen today for initial palliative care counseling assessment via [employer] video.  HAd to switch to phone part way through due to poor internet connection.    Referred by: He was not sure who had referred him.  Seems he was seen by palliative care team during a recent hospitalization and follow up with palliative was recommended.  He is followed by [facility] and so an appointment was made with the Palliative NP there, but he cancelled the appointment the day of due to not feeling it  "was necessary / not knowing why it had been made.     Presenting Issues: Coping with illness    Preferred Name: Venkatesh    Mental Status Exam: (List all that apply)      Appearance: Appropriate      Eye Contact: Good       Orientation: Yes, x4      Mood: Normal      Affect: Appropriate      Thought Content: Clear         Thought Form: Logical      Psychomotor Behavior: Normal    Family:       Marital status:     Years : not asked    Years together: not asked     Name of spouse/partner: not asked      Children: Son Tim lives about 10 minutes away, helps Venkatesh with meals, transport to appointments.  Venkatesh describes him as very supportive.        Parents: not asked      Siblings: has a brother \"he comes and goes\".  Reports that his brother wanted Venkatesh to move into an assisted living apartment, which Venkatesh has resisted as he reports he is still able to take care of most ADL's independently and likes where he lives at this time. Feels his brother has stopped speaking to him due to this.       Other:     Support system: Family    Living situation: House   Difficulty accessing and/or getting around living space: no   Other concerns: no     Employment history:      Current employment status:  Retired         Kind of work:        Spouses/SO current employment status:       Kind of work:     Education highest level: not asked     Financial:       Descriptor: not asked       Health insurance:     Legal concerns: not asked        Area(s) of concern:     Health Care Directive: Has one:  no       If yes, copy in EMR: No       Basic information regarding health care directive provided: no        Health Care Agent(s): No health care directive:  Surrogate  health care decision maker is per legal succession  POLST?     Medical History/Issues (patient account): Diagnosed in October 2019 treated with \"numerous drugs\" also went through treatment of \"taking red blood cells, putting them back and then " "putting the white cells back a week later to help my immune system\", chemotherapy, radiation to back for pain.  Hospitalized last month \"not sure if it was because of pneumonia or the chemo\".  SInce back, is not back to his current baseline and the most notable new symptom has been urinary incontinence.  He had a catheter placed yesterday and reports that he is now feeling much better.     Venkatesh shared he understands that his cancer is slow growing.  He wonders if the burdens of treatment justify the benefits.  He has a PET scan tomorrow and an appointment with his oncologist on Monday to discuss the results.  I encouraged him to talk to his oncologist about these questions.  He shared his family has told him they will support any choice he makes.       Pain/Discomfort Issues: incontinence, fatigue, low endurance     Coping: struggling with trying to figure out if this is his new baselien \"things keep happening and they have to keep adding things [to manage medical events]\" Shared he is \"pretty sure this is what it's going to be like until the end\".  Misses bowling and golfing.  Still tries to walk as much as he can but cannot do the 3 mile walk that he used to take daily. Enjoys seeing his son.     \"Sometimes my mood can be pretty rotten, but it's been better since the catheter was placed\".  He does not endorse persistently low mood, though I did not get to do a full depression screen.  Feels his mood fluctuates in relation to how he is feeling physically.  He is also preparing for another scan and is generally trying to get a sense of what to expect regarding his cancer and his health going forward.    Difficult to sit with the many unknowns of his situation  Regarding decision to continue with active treatment or not, uncertainty of prognosis based on these decisions, wonder if this is his new baseline, uncertainty over what to expect in terms of symptom burden / symptom management      Sleep: not asked "     Sexual Health/Intimacy: not asked     Mental Health History and Current Review of Symptoms (patient account):     Much of today's visit was spent providing education about palliative care and our role in his care as he is not currently followed by a palliative care medical provider. Did not get to do a formal depression screen     Depression in past?:     Treatment in past?:     Treatment currently?:      Depression symptoms currently?:  - Anhedonia:    - Hopeless or down mood:    - Sleep problems (too much or too little):    - Fatigue:    - Appetite (too much or too little):    - Excessively negative self perception:    - Trouble concentrating:    - Motor slowness:    - Current and/or recent thoughts of suicide:      Suicide risk screen:  - Past thoughts of suicide?    - Past attempts to end own life?    - If yes, how?   - Protective factors currently?   - Safety plan needed currently?     Anxiety in past?:    Treatment in past?     Treatment currently?       Anxiety symptoms currently?  - Nervous, on edge:    - Sleep problems:    - Worrying a lot/hard to manage worries:    Specific recent areas of worry/fear/concern:   - Tense, hard to relax:    - Feeling restless, hard to sit still:    - Irritable:    - Feeling of dread/ afraid that something awful may happen:    - How long?   - Impacts on daily life?             Grief vs Depression:  Endorses symptoms for:     Psychological Trauma and/or Major Losses:   Not asked     Nightmares?      Thought about when not wanting to think about?   Tried hard not to think about it?   Avoided situations that reminded you of it?   Felt constantly on guard, watchful, or easily startled?   Felt numb or detached from others, activities, or your surroundings?     Safety Screen:  History of being harmed or controlled by someone close to you?    Being hurt or controlled by someone close to you?    Worried will be harmed in future?    Worried will harm someone else in future?      CD  History:   Using alcohol actively?     Amount per week:   Current concerns (self or other) about alcohol or drug use?   Past concerns and/or CD treatment?         /Spirituality:       Belong to a  community:      Specify:        Identify with a particular Gnosticism:       Identify as spiritual:       How find expression:     Hope:      What do you hope for:          What gives you hope:         Internal Resources (positive memories, sources of salvador):     Perceived Needs: Venkatesh was not sure if my services would be helpful at this point in time.      Resource needs/Referrals: None     Will complete diagnosis once assessment complete in future visit.               Intervention: Initial palliative care counseling / clinical social work evaluation was conducted.  Palliative Care Counseling interventions available were discussed, including counseling related to serious illness, behavioral interventions for symptom management, consultation regarding goals of care/health care directive/POLST, and other interventions specific to the patient's situation or concerns.     Plan: will follow up as needed.     DSM5 Diagnoses:   deferred    Time Spent with Patient/Family: 50 minutes  (Start 12:30, end 1:20)    CAT Ruelas, [facility]   Palliative Care    Pgr:[phone]  Ph: [phone]      DO NOT SEND ANY LETTERS          Again, thank you for allowing me to participate in the care of your patient.        Sincerely,        HERMINIO Ruelas

## 2022-06-24 ENCOUNTER — TELEPHONE (OUTPATIENT)
Dept: FAMILY MEDICINE | Facility: CLINIC | Age: 72
End: 2022-06-24

## 2022-06-24 ENCOUNTER — TRANSFERRED RECORDS (OUTPATIENT)
Dept: HEALTH INFORMATION MANAGEMENT | Facility: CLINIC | Age: 72
End: 2022-06-24

## 2022-06-24 NOTE — TELEPHONE ENCOUNTER
Reason for Call: Request for an order or referral:    Order or referral being requested: HC OT    CONT OT ONCE WEEKLY X 3 WEEKS TO ADDRESS UPPER EXTREMITY HOME EXERCISES FOR ENDURANCE, IADLS RETRAINING, ENERGY CONSERVATION, FALL PREVENTION, AND INFECTION CONTROL.    Date needed: as soon as possible    Has the patient been seen by the PCP for this problem? YES    Phone number Niki can be reached at: [phone]    Best Time:  daytime    Can we leave a detailed message on this number?  YES    Call taken on 6/24/2022 at 2:32 PM by Belinda Berumen CMA. TC

## 2022-06-24 NOTE — TELEPHONE ENCOUNTER
Called with detail message left to relay verbal order from provider for HC OT.  Call back number left for questions.    AYALA Mendez, RN  [facility]

## 2022-06-27 ENCOUNTER — TRANSFERRED RECORDS (OUTPATIENT)
Dept: HEALTH INFORMATION MANAGEMENT | Facility: CLINIC | Age: 72
End: 2022-06-27

## 2022-06-28 ENCOUNTER — MEDICAL CORRESPONDENCE (OUTPATIENT)
Dept: HEALTH INFORMATION MANAGEMENT | Facility: CLINIC | Age: 72
End: 2022-06-28

## 2022-06-30 ENCOUNTER — TRANSFERRED RECORDS (OUTPATIENT)
Dept: HEALTH INFORMATION MANAGEMENT | Facility: CLINIC | Age: 72
End: 2022-06-30

## 2022-06-30 ENCOUNTER — MEDICAL CORRESPONDENCE (OUTPATIENT)
Dept: HEALTH INFORMATION MANAGEMENT | Facility: CLINIC | Age: 72
End: 2022-06-30

## 2022-07-05 ENCOUNTER — TELEPHONE (OUTPATIENT)
Dept: FAMILY MEDICINE | Facility: CLINIC | Age: 72
End: 2022-07-05

## 2022-07-05 NOTE — TELEPHONE ENCOUNTER
Reason for Call:  Home Health Care    JeannaRN with [facility] called regarding (reason for call): verbal orders    Orders are needed for this patient. Skilled Nursing    PT: n/a    OT: n/a    Skilled Nursin visit - discharge per patient request    Pt Provider: Blanca    Phone Number Homecare Nurse can be reached at: [phone]    Can we leave a detailed message on this number? YES - confidential    Phone number patient can be reached at: Other phone number:  n/a    Best Time: anytime    Call taken on 2022 at 4:50 PM by Bruce Hicks

## 2022-07-07 ENCOUNTER — MEDICAL CORRESPONDENCE (OUTPATIENT)
Dept: HEALTH INFORMATION MANAGEMENT | Facility: CLINIC | Age: 72
End: 2022-07-07

## 2022-07-08 ENCOUNTER — TELEPHONE (OUTPATIENT)
Dept: FAMILY MEDICINE | Facility: CLINIC | Age: 72
End: 2022-07-08

## 2022-07-08 NOTE — TELEPHONE ENCOUNTER
Reason for Call:  Home Health Care    Ruddy PT with [facility] called regarding (reason for call): verbal orders    Orders are needed for this patient. PT    PT: discharge 2 session early to to patient achieving goals    OT: n/a    Skilled Nursing: n/a    Pt Provider: Blanca    Phone Number Homecare Nurse can be reached at: [phone]    Can we leave a detailed message on this number? YES - confidential    Phone number patient can be reached at: Other phone number:  n/a    Best Time: any    Call taken on 7/8/2022 at 8:44 AM by Bruce Hicks

## 2022-07-08 NOTE — TELEPHONE ENCOUNTER
Called with detailed message left for service provider to relay provider's verbal order below.  Call back number provided for questions.    SHANNAN MendezN, RN  [facility]

## 2022-07-11 ENCOUNTER — MEDICAL CORRESPONDENCE (OUTPATIENT)
Dept: HEALTH INFORMATION MANAGEMENT | Facility: CLINIC | Age: 72
End: 2022-07-11

## 2022-07-14 ENCOUNTER — TRANSFERRED RECORDS (OUTPATIENT)
Dept: HEALTH INFORMATION MANAGEMENT | Facility: CLINIC | Age: 72
End: 2022-07-14

## 2022-07-23 ENCOUNTER — HEALTH MAINTENANCE LETTER (OUTPATIENT)
Age: 72
End: 2022-07-23

## 2022-08-24 ENCOUNTER — HOSPITAL ENCOUNTER (OUTPATIENT)
Dept: ULTRASOUND IMAGING | Facility: HOSPITAL | Age: 72
Discharge: HOME | End: 2022-08-24
Attending: UROLOGY | Admitting: UROLOGY
Payer: MEDICARE

## 2022-08-24 DIAGNOSIS — C61 MALIGNANT NEOPLASM OF PROSTATE (H): ICD-10-CM

## 2022-08-24 PROCEDURE — 76770 US EXAM ABDO BACK WALL COMP: CPT

## 2022-09-27 ENCOUNTER — TRANSFERRED RECORDS (OUTPATIENT)
Dept: HEALTH INFORMATION MANAGEMENT | Facility: CLINIC | Age: 72
End: 2022-09-27

## 2022-10-01 ENCOUNTER — HEALTH MAINTENANCE LETTER (OUTPATIENT)
Age: 72
End: 2022-10-01

## 2022-12-14 ENCOUNTER — TRANSFERRED RECORDS (OUTPATIENT)
Dept: HEALTH INFORMATION MANAGEMENT | Facility: CLINIC | Age: 72
End: 2022-12-14

## 2022-12-19 ENCOUNTER — TRANSFERRED RECORDS (OUTPATIENT)
Dept: HEALTH INFORMATION MANAGEMENT | Facility: CLINIC | Age: 72
End: 2022-12-19

## 2022-12-22 ENCOUNTER — TRANSFERRED RECORDS (OUTPATIENT)
Dept: HEALTH INFORMATION MANAGEMENT | Facility: CLINIC | Age: 72
End: 2022-12-22

## 2022-12-26 ENCOUNTER — HOSPITAL ENCOUNTER (OUTPATIENT)
Dept: MRI IMAGING | Facility: HOSPITAL | Age: 72
Discharge: HOME | End: 2022-12-26
Attending: INTERNAL MEDICINE | Admitting: INTERNAL MEDICINE
Payer: MEDICARE

## 2022-12-26 DIAGNOSIS — C79.52 SECONDARY MALIGNANT NEOPLASM OF BONE AND BONE MARROW (H): ICD-10-CM

## 2022-12-26 DIAGNOSIS — C79.51 SECONDARY MALIGNANT NEOPLASM OF BONE AND BONE MARROW (H): ICD-10-CM

## 2022-12-26 DIAGNOSIS — C61 MALIGNANT NEOPLASM OF PROSTATE (H): ICD-10-CM

## 2022-12-26 PROCEDURE — 255N000002 HC RX 255 OP 636: Performed by: INTERNAL MEDICINE

## 2022-12-26 PROCEDURE — 72197 MRI PELVIS W/O & W/DYE: CPT

## 2022-12-26 PROCEDURE — A9585 GADOBUTROL INJECTION: HCPCS | Performed by: INTERNAL MEDICINE

## 2022-12-26 RX ORDER — GADOBUTROL 604.72 MG/ML
0.1 INJECTION INTRAVENOUS ONCE
Status: COMPLETED | OUTPATIENT
Start: 2022-12-26 | End: 2022-12-26

## 2022-12-26 RX ADMIN — GADOBUTROL 10 ML: 604.72 INJECTION INTRAVENOUS at 19:44

## 2022-12-28 ENCOUNTER — TRANSFERRED RECORDS (OUTPATIENT)
Dept: HEALTH INFORMATION MANAGEMENT | Facility: CLINIC | Age: 72
End: 2022-12-28

## 2023-01-10 ENCOUNTER — TRANSFERRED RECORDS (OUTPATIENT)
Dept: HEALTH INFORMATION MANAGEMENT | Facility: CLINIC | Age: 73
End: 2023-01-10

## 2023-01-12 ENCOUNTER — TRANSFERRED RECORDS (OUTPATIENT)
Dept: HEALTH INFORMATION MANAGEMENT | Facility: CLINIC | Age: 73
End: 2023-01-12

## 2023-02-20 ENCOUNTER — TRANSFERRED RECORDS (OUTPATIENT)
Dept: HEALTH INFORMATION MANAGEMENT | Facility: CLINIC | Age: 73
End: 2023-02-20
Payer: MEDICARE

## 2023-03-08 ENCOUNTER — TRANSFERRED RECORDS (OUTPATIENT)
Dept: HEALTH INFORMATION MANAGEMENT | Facility: CLINIC | Age: 73
End: 2023-03-08
Payer: MEDICARE

## 2023-04-10 ENCOUNTER — TELEPHONE (OUTPATIENT)
Dept: FAMILY MEDICINE | Facility: CLINIC | Age: 73
End: 2023-04-10
Payer: MEDICARE

## 2023-04-10 NOTE — TELEPHONE ENCOUNTER
Medication Question or Refill    Contacts       Type Contact Phone/Fax    04/10/2023 10:53 AM CDT Phone (Incoming) Venkatesh Alvarado (Self) [phone] (H)          What medication are you calling about (include dose and sig)?: amlodipine 2.5 tablet    Preferred Pharmacy:   [id number] [address]  [address] [place] [id number]  Phone: [phone] Fax: [phone]      Controlled Substance Agreement on file:   CSA -- Patient Level:    CSA: None found at the patient level.       Who prescribed the medication?: Javier    Do you need a refill? Yes    When did you use the medication last? 4/10/23    Patient offered an appointment? Yes: physical on 4/19 with Dr. Rios    Do you have any questions or concerns?  Yes: Patient is requesting for refills but our records shows prescription is discontinue. Patient is now unsure if prescription was discontinue. He's wondering if Dr. Rios will be able to clarify or does he need to contact his oncology provider? Please advise if appropriate.      Could we send this information to you in [employer] or would you prefer to receive a phone call?:   Patient would prefer a phone call   Okay to leave a detailed message?: Yes at Home number on file [phone] (home)

## 2023-04-10 NOTE — TELEPHONE ENCOUNTER
Talked to patient on the phone to find out more information about medication requested.  Patient reported medication was prescribed by another provider.  Chart review, patient was last seen virtually with Dr. Rios on 6/17/2022.  Patient does have a follow up with Dr. Rios on 4/19/2023.  Patient further reported has 8 more days of medication to take and can wait to address with provider.  No further action needed.    SHANNAN MendezN, RN  [facility]

## 2023-04-19 ENCOUNTER — OFFICE VISIT (OUTPATIENT)
Dept: FAMILY MEDICINE | Facility: CLINIC | Age: 73
End: 2023-04-19
Payer: MEDICARE

## 2023-04-19 VITALS
DIASTOLIC BLOOD PRESSURE: 75 MMHG | OXYGEN SATURATION: 99 % | HEART RATE: 65 BPM | WEIGHT: 208 LBS | HEIGHT: 68 IN | SYSTOLIC BLOOD PRESSURE: 124 MMHG | RESPIRATION RATE: 16 BRPM | BODY MASS INDEX: 31.52 KG/M2 | TEMPERATURE: 97.8 F

## 2023-04-19 DIAGNOSIS — Z23 NEED FOR COVID-19 VACCINE: ICD-10-CM

## 2023-04-19 DIAGNOSIS — Z00.00 ROUTINE GENERAL MEDICAL EXAMINATION AT A HEALTH CARE FACILITY: Primary | ICD-10-CM

## 2023-04-19 DIAGNOSIS — I10 ESSENTIAL HYPERTENSION: ICD-10-CM

## 2023-04-19 DIAGNOSIS — C79.51 PROSTATE CANCER METASTATIC TO BONE (H): ICD-10-CM

## 2023-04-19 DIAGNOSIS — C61 PROSTATE CANCER METASTATIC TO BONE (H): ICD-10-CM

## 2023-04-19 DIAGNOSIS — G62.9 PERIPHERAL POLYNEUROPATHY: ICD-10-CM

## 2023-04-19 PROCEDURE — G0439 PPPS, SUBSEQ VISIT: HCPCS | Performed by: FAMILY MEDICINE

## 2023-04-19 PROCEDURE — 0124A COVID-19 VACCINE BIVALENT BOOSTER 12+ (PFIZER): CPT | Performed by: FAMILY MEDICINE

## 2023-04-19 PROCEDURE — 91312 COVID-19 VACCINE BIVALENT BOOSTER 12+ (PFIZER): CPT | Performed by: FAMILY MEDICINE

## 2023-04-19 RX ORDER — AMLODIPINE BESYLATE 2.5 MG/1
5 TABLET ORAL DAILY
Qty: 90 TABLET | Refills: 3 | Status: SHIPPED | OUTPATIENT
Start: 2023-04-19 | End: 2023-05-26

## 2023-04-19 RX ORDER — AMLODIPINE BESYLATE 2.5 MG/1
1 TABLET ORAL DAILY
COMMUNITY
Start: 2021-06-16 | End: 2023-10-21

## 2023-04-19 RX ORDER — GABAPENTIN 100 MG/1
CAPSULE ORAL
Qty: 60 CAPSULE | Refills: 11 | Status: ON HOLD | OUTPATIENT
Start: 2023-04-19 | End: 2023-10-22

## 2023-04-19 RX ORDER — HYDROCODONE BITARTRATE AND ACETAMINOPHEN 5; 325 MG/1; MG/1
1 TABLET ORAL PRN
COMMUNITY
Start: 2023-02-13 | End: 2023-10-21

## 2023-04-19 ASSESSMENT — ENCOUNTER SYMPTOMS
CHILLS: 0
FEVER: 0
NERVOUS/ANXIOUS: 0
DIZZINESS: 0
MYALGIAS: 0
HEARTBURN: 0
PALPITATIONS: 0
WEAKNESS: 0
HEMATOCHEZIA: 0
DYSURIA: 0
DIARRHEA: 0
ABDOMINAL PAIN: 0
CONSTIPATION: 1
JOINT SWELLING: 0
ARTHRALGIAS: 1
EYE PAIN: 0
PARESTHESIAS: 0
SHORTNESS OF BREATH: 0
COUGH: 0
NAUSEA: 0
SORE THROAT: 0
FREQUENCY: 1
HEADACHES: 0
HEMATURIA: 0

## 2023-04-19 ASSESSMENT — ACTIVITIES OF DAILY LIVING (ADL): CURRENT_FUNCTION: NO ASSISTANCE NEEDED

## 2023-04-19 NOTE — PROGRESS NOTES
"SUBJECTIVE:   Venkatesh is a 72 year old who presents for Preventive Visit.      4/19/2023     9:11 AM   Additional Questions   Roomed by Katie LEE     Patient has been advised of split billing requirements and indicates understanding: Yes  Are you in the first 12 months of your Medicare coverage?  No    Healthy Habits:     In general, how would you rate your overall health?  Fair    Frequency of exercise:  4-5 days/week    Duration of exercise:  30-45 minutes    Do you usually eat at least 4 servings of fruit and vegetables a day, include whole grains    & fiber and avoid regularly eating high fat or \"junk\" foods?  No    Taking medications regularly:  Yes    Medication side effects:  None    Ability to successfully perform activities of daily living:  No assistance needed    Home Safety:  No safety concerns identified    Hearing Impairment:  No hearing concerns    In the past 6 months, have you been bothered by leaking of urine? Yes    In general, how would you rate your overall mental or emotional health?  Good      PHQ-2 Total Score: 0        Feet are hot at night.  Lidocaine helps.    Have you ever done Advance Care Planning? (For example, a Health Directive, POLST, or a discussion with a medical provider or your loved ones about your wishes): No, advance care planning information given to patient to review.  Patient declined advance care planning discussion at this time.       Fall risk  Fallen 2 or more times in the past year?: No  Any fall with injury in the past year?: No  click delete button to remove this line now  Cognitive Screening   1) Repeat 3 items (Leader, Season, Table)    2) Clock draw: NORMAL  3) 3 item recall: Recalls 3 objects  Results: NORMAL clock, 1-2 items recalled: COGNITIVE IMPAIRMENT LESS LIKELY    Mini-CogTM Copyright HILLARY Belcher. Licensed by the author for use in [facility]; reprinted with permission (musa@.[facility]). All rights reserved.      Do you have sleep apnea, excessive " snoring or daytime drowsiness?: yes    Reviewed and updated as needed this visit by clinical staff   Tobacco  Allergies  Meds              Reviewed and updated as needed this visit by Provider                 Social History     Tobacco Use     Smoking status: Former     Packs/day: 0.00     Types: Cigarettes     Smokeless tobacco: Never     Tobacco comments:     Quit 30 years ago.   Vaping Use     Vaping status: Not on file   Substance Use Topics     Alcohol use: Yes     Alcohol/week: 1.0 - 3.0 standard drink of alcohol     Comment: Alcoholic Drinks/day: 3 times a week.             4/19/2023     9:05 AM   Alcohol Use   Prescreen: >3 drinks/day or >7 drinks/week? No          View : No data to display.              Do you have a current opioid prescription? Yes   How severe is your pain on a scale from 1-10? 7/10            View : No data to display.              Low Risk (0-3)  Moderate Risk (4-7)  High Risk (>8)  Do you use any other controlled substances or medications that are not prescribed by a provider? None              Current providers sharing in care for this patient include:   Patient Care Team:  Siva Rios MD as PCP - General (Family Medicine)  Siva Rios MD as Assigned PCP    The following health maintenance items are reviewed in Epic and correct as of today:  Health Maintenance   Topic Date Due     HEPATITIS C SCREENING  Never done     ZOSTER IMMUNIZATION (1 of 2) 09/16/2014     LIPID  07/22/2019     DTAP/TDAP/TD IMMUNIZATION (2 - Td or Tdap) 04/13/2020     MEDICARE ANNUAL WELLNESS VISIT  03/10/2021     COVID-19 Vaccine (4 - Booster for Pfizer series) 12/14/2021     LUNG CANCER SCREENING  05/29/2023     ANNUAL REVIEW OF HM ORDERS  06/17/2023     FALL RISK ASSESSMENT  04/19/2024     ADVANCE CARE PLANNING  03/10/2025     COLORECTAL CANCER SCREENING  09/01/2025     PHQ-2 (once per calendar year)  Completed     INFLUENZA VACCINE  Completed     Pneumococcal Vaccine: 65+ Years  Completed      AORTIC ANEURYSM SCREENING (SYSTEM ASSIGNED)  Completed     IPV IMMUNIZATION  Aged Out     MENINGITIS IMMUNIZATION  Aged Out         Current Outpatient Medications   Medication Sig Dispense Refill     amLODIPine (NORVASC) 2.5 MG tablet Take 1 tablet by mouth daily       amLODIPine (NORVASC) 2.5 MG tablet Take 2 tablets (5 mg) by mouth daily 90 tablet 3     gabapentin (NEURONTIN) 100 MG capsule Take 1-2 capsules at bedtime 60 capsule 11     HYDROcodone-acetaminophen (NORCO) 5-325 MG tablet Take 1 tablet by mouth as needed       leuprolide (ELIGARD) 45 MG kit Inject 45 mg Subcutaneous every 6 months       acetaminophen (TYLENOL) 325 MG tablet Take 2 tablets (650 mg) by mouth every 6 hours as needed for mild pain or fever       denosumab (XGEVA) 120 mg/1.7 mL (70 mg/mL) Soln [DENOSUMAB (XGEVA) 120 MG/1.7 ML (70 MG/ML) SOLN] Inject 120 mg under the skin every 30 (thirty) days.        docusate sodium (COLACE) 100 MG capsule [DOCUSATE SODIUM (COLACE) 100 MG CAPSULE] Take 100 mg by mouth 2 (two) times a day as needed for constipation.       pantoprazole (PROTONIX) 40 MG EC tablet Take 1 tablet (40 mg) by mouth every morning (before breakfast) 14 tablet 0     polyethylene glycol (MIRALAX) 17 g packet Take 17 g by mouth daily           Review of Systems   Constitutional: Negative for chills and fever.   HENT: Negative for congestion, ear pain, hearing loss and sore throat.    Eyes: Negative for pain and visual disturbance.   Respiratory: Negative for cough and shortness of breath.    Cardiovascular: Negative for chest pain, palpitations and peripheral edema.   Gastrointestinal: Positive for constipation. Negative for abdominal pain, diarrhea, heartburn, hematochezia and nausea.   Genitourinary: Positive for frequency, impotence, penile discharge and urgency. Negative for dysuria, genital sores and hematuria.   Musculoskeletal: Positive for arthralgias. Negative for joint swelling and myalgias.   Skin: Negative for rash.  "  Neurological: Negative for dizziness, weakness, headaches and paresthesias.   Psychiatric/Behavioral: Negative for mood changes. The patient is not nervous/anxious.          OBJECTIVE:   There were no vitals taken for this visit. Estimated body mass index is 28.85 kg/m  as calculated from the following:    Height as of 5/30/22: 1.768 m (5' 9.6\").    Weight as of 6/2/22: 90.2 kg (198 lb 12.8 oz).  Physical Exam  Eyes: EOM full, pupils normal, conjunctivae normal  Ears: wax 90% bilateral  Oropharynx: normal  Neck: supple without adenopathy or thyromegaly  Lungs: normal  Heart: regular rhythm, normal rate, no murmur  Abdomen: no HSM, mass or tenderness  Extremities: FROM, normal strength and sensation  Feet normal        ASSESSMENT / PLAN:   Venkatesh was seen today for wellness visit, recheck medication and neuropathy.    Diagnoses and all orders for this visit:    Routine general medical examination at a health care facility  -     PRIMARY CARE FOLLOW-UP SCHEDULING; Future    Essential hypertension  -     amLODIPine (NORVASC) 2.5 MG tablet; Take 2 tablets (5 mg) by mouth daily    Prostate cancer metastatic to bone (H)    Peripheral polyneuropathy  -     gabapentin (NEURONTIN) 100 MG capsule; Take 1-2 capsules at bedtime    Need for COVID-19 vaccine  -     COVID-19 VACCINE BIVALENT BOOSTER 12+ (PFIZER)      Begin gabapentin.  Might need to titrate up.    Patient has been advised of split billing requirements and indicates understanding: Yes      COUNSELING:  Reviewed preventive health counseling, as reflected in patient instructions       Healthy diet/nutrition      BMI:   Estimated body mass index is 28.85 kg/m  as calculated from the following:    Height as of 5/30/22: 1.768 m (5' 9.6\").    Weight as of 6/2/22: 90.2 kg (198 lb 12.8 oz).         He reports that he has quit smoking. He has never used smokeless tobacco.      Appropriate preventive services were discussed with this patient, including applicable screening " as appropriate for cardiovascular disease, diabetes, osteopenia/osteoporosis, and glaucoma.  As appropriate for age/gender, discussed screening for colorectal cancer, prostate cancer, breast cancer, and cervical cancer. Checklist reviewing preventive services available has been given to the patient.    Reviewed patients plan of care and provided an AVS. The Basic Care Plan (routine screening as documented in Health Maintenance) for Venkatesh meets the Care Plan requirement. This Care Plan has been established and reviewed with the Patient.          Siva Rios MD  [facility]    Identified Health Risks:    I have reviewed Opioid Use Disorder and Substance Use Disorder risk factors and made any needed referrals.

## 2023-05-22 ENCOUNTER — TRANSFERRED RECORDS (OUTPATIENT)
Dept: HEALTH INFORMATION MANAGEMENT | Facility: CLINIC | Age: 73
End: 2023-05-22
Payer: MEDICARE

## 2023-05-26 DIAGNOSIS — I10 ESSENTIAL HYPERTENSION: ICD-10-CM

## 2023-05-26 RX ORDER — AMLODIPINE BESYLATE 2.5 MG/1
TABLET ORAL
Qty: 180 TABLET | Refills: 3 | Status: ON HOLD | OUTPATIENT
Start: 2023-05-26 | End: 2023-10-24

## 2023-05-26 NOTE — TELEPHONE ENCOUNTER
"Last Written Prescription Date:  4/19/23  Last Fill Quantity: 90,  # refills: 3   Last office visit provider:  4/19/23     Requested Prescriptions   Pending Prescriptions Disp Refills     amLODIPine (NORVASC) 2.5 MG tablet [Pharmacy Med Name: AMLODIPINE BESYLATE 2.5 MG TAB] 90 tablet 3     Sig: TAKE 2 TABLETS BY MOUTH EVERY DAY       Calcium Channel Blockers Protocol  Passed - 5/26/2023  7:30 AM        Passed - Blood pressure under 140/90 in past 12 months     BP Readings from Last 3 Encounters:   04/19/23 124/75   06/07/22 131/82   03/23/22 134/72                 Passed - Recent (12 mo) or future (30 days) visit within the authorizing provider's specialty     Patient has had an office visit with the authorizing provider or a provider within the authorizing providers department within the previous 12 mos or has a future within next 30 days. See \"Patient Info\" tab in inbasket, or \"Choose Columns\" in Meds & Orders section of the refill encounter.              Passed - Medication is active on med list        Passed - Patient is age 18 or older        Passed - Normal serum creatinine on file in past 12 months     Recent Labs   Lab Test 06/04/22  1124   CR 0.79       Ok to refill medication if creatinine is low               Elizabeth Teague, RN 05/26/23 5:40 PM  "

## 2023-07-12 ENCOUNTER — TELEPHONE (OUTPATIENT)
Dept: FAMILY MEDICINE | Facility: CLINIC | Age: 73
End: 2023-07-12
Payer: MEDICARE

## 2023-07-12 DIAGNOSIS — C79.51 PROSTATE CANCER METASTATIC TO BONE (H): Primary | ICD-10-CM

## 2023-07-12 DIAGNOSIS — M85.852 OTHER SPECIFIED DISORDERS OF BONE DENSITY AND STRUCTURE, LEFT THIGH: ICD-10-CM

## 2023-07-12 DIAGNOSIS — C61 PROSTATE CANCER METASTATIC TO BONE (H): Primary | ICD-10-CM

## 2023-07-12 NOTE — TELEPHONE ENCOUNTER
I entered order.  Please ask pt to call [phone] to schedule.  Thanks - [facility]

## 2023-07-12 NOTE — TELEPHONE ENCOUNTER
Order/Referral Request    Who is requesting: Patient    Orders being requested: Bone Density scan    Reason service is needed/diagnosis: recommended by [facility]    When are orders needed by: ASAP    Has this been discussed with Provider: No    Does patient have a preference on a Group/Provider/Facility? [facility]    Does patient have an appointment scheduled?: No    Where to send orders: Place orders within Epic    Could we send this information to you in [facility] or would you prefer to receive a phone call?:   Patient would prefer a phone call   Okay to leave a detailed message?: Yes at Home number on file [phone] (home)

## 2023-07-14 ENCOUNTER — TRANSFERRED RECORDS (OUTPATIENT)
Dept: HEALTH INFORMATION MANAGEMENT | Facility: CLINIC | Age: 73
End: 2023-07-14
Payer: MEDICARE

## 2023-07-25 ENCOUNTER — HOSPITAL ENCOUNTER (OUTPATIENT)
Dept: BONE DENSITY | Facility: HOSPITAL | Age: 73
Discharge: HOME | End: 2023-07-25
Attending: FAMILY MEDICINE | Admitting: FAMILY MEDICINE
Payer: MEDICARE

## 2023-07-25 DIAGNOSIS — M85.852 OTHER SPECIFIED DISORDERS OF BONE DENSITY AND STRUCTURE, LEFT THIGH: ICD-10-CM

## 2023-07-25 DIAGNOSIS — C61 PROSTATE CANCER METASTATIC TO BONE (H): ICD-10-CM

## 2023-07-25 DIAGNOSIS — C79.51 PROSTATE CANCER METASTATIC TO BONE (H): ICD-10-CM

## 2023-07-25 PROCEDURE — 77080 DXA BONE DENSITY AXIAL: CPT

## 2023-08-01 ENCOUNTER — TRANSFERRED RECORDS (OUTPATIENT)
Dept: HEALTH INFORMATION MANAGEMENT | Facility: CLINIC | Age: 73
End: 2023-08-01
Payer: MEDICARE

## 2023-08-10 ENCOUNTER — TRANSFERRED RECORDS (OUTPATIENT)
Dept: HEALTH INFORMATION MANAGEMENT | Facility: CLINIC | Age: 73
End: 2023-08-10
Payer: MEDICARE

## 2023-08-12 ENCOUNTER — HEALTH MAINTENANCE LETTER (OUTPATIENT)
Age: 73
End: 2023-08-12

## 2023-08-15 ENCOUNTER — TRANSFERRED RECORDS (OUTPATIENT)
Dept: HEALTH INFORMATION MANAGEMENT | Facility: CLINIC | Age: 73
End: 2023-08-15
Payer: MEDICARE

## 2023-09-19 ENCOUNTER — TRANSFERRED RECORDS (OUTPATIENT)
Dept: HEALTH INFORMATION MANAGEMENT | Facility: CLINIC | Age: 73
End: 2023-09-19
Payer: MEDICARE

## 2023-10-19 ENCOUNTER — HOSPITAL ENCOUNTER (EMERGENCY)
Facility: HOSPITAL | Age: 73
Discharge: HOME | DRG: 698 | End: 2023-10-19
Attending: STUDENT IN AN ORGANIZED HEALTH CARE EDUCATION/TRAINING PROGRAM | Admitting: STUDENT IN AN ORGANIZED HEALTH CARE EDUCATION/TRAINING PROGRAM
Payer: MEDICARE

## 2023-10-19 VITALS
RESPIRATION RATE: 27 BRPM | BODY MASS INDEX: 31.63 KG/M2 | HEART RATE: 65 BPM | WEIGHT: 205 LBS | SYSTOLIC BLOOD PRESSURE: 112 MMHG | TEMPERATURE: 99.1 F | OXYGEN SATURATION: 96 % | DIASTOLIC BLOOD PRESSURE: 55 MMHG

## 2023-10-19 DIAGNOSIS — R55 VASOVAGAL SYNCOPE: ICD-10-CM

## 2023-10-19 LAB
ANION GAP SERPL CALCULATED.3IONS-SCNC: 12 MMOL/L (ref 7–15)
ATRIAL RATE - MUSE: 76 BPM
BASO+EOS+MONOS # BLD AUTO: ABNORMAL 10*3/UL
BASO+EOS+MONOS NFR BLD AUTO: ABNORMAL %
BASOPHILS # BLD AUTO: 0 10E3/UL (ref 0–0.2)
BASOPHILS NFR BLD AUTO: 0 %
BUN SERPL-MCNC: 23.2 MG/DL (ref 8–23)
CALCIUM SERPL-MCNC: 9.1 MG/DL (ref 8.8–10.2)
CHLORIDE SERPL-SCNC: 97 MMOL/L (ref 98–107)
CREAT SERPL-MCNC: 1.28 MG/DL (ref 0.67–1.17)
DEPRECATED HCO3 PLAS-SCNC: 23 MMOL/L (ref 22–29)
DIASTOLIC BLOOD PRESSURE - MUSE: 82 MMHG
EGFRCR SERPLBLD CKD-EPI 2021: 60 ML/MIN/1.73M2
EOSINOPHIL # BLD AUTO: 0 10E3/UL (ref 0–0.7)
EOSINOPHIL NFR BLD AUTO: 0 %
ERYTHROCYTE [DISTWIDTH] IN BLOOD BY AUTOMATED COUNT: 12.9 % (ref 10–15)
GLUCOSE BLDC GLUCOMTR-MCNC: 110 MG/DL (ref 70–99)
GLUCOSE SERPL-MCNC: 125 MG/DL (ref 70–99)
HCT VFR BLD AUTO: 39.7 % (ref 40–53)
HGB BLD-MCNC: 13.8 G/DL (ref 13.3–17.7)
HOLD SPECIMEN: NORMAL
HOLD SPECIMEN: NORMAL
IMM GRANULOCYTES # BLD: 0.1 10E3/UL
IMM GRANULOCYTES NFR BLD: 1 %
INTERPRETATION ECG - MUSE: NORMAL
LYMPHOCYTES # BLD AUTO: 0.6 10E3/UL (ref 0.8–5.3)
LYMPHOCYTES NFR BLD AUTO: 7 %
MAGNESIUM SERPL-MCNC: 1.9 MG/DL (ref 1.7–2.3)
MCH RBC QN AUTO: 33.4 PG (ref 26.5–33)
MCHC RBC AUTO-ENTMCNC: 34.8 G/DL (ref 31.5–36.5)
MCV RBC AUTO: 96 FL (ref 78–100)
MONOCYTES # BLD AUTO: 0.8 10E3/UL (ref 0–1.3)
MONOCYTES NFR BLD AUTO: 10 %
NEUTROPHILS # BLD AUTO: 6.4 10E3/UL (ref 1.6–8.3)
NEUTROPHILS NFR BLD AUTO: 82 %
NRBC # BLD AUTO: 0 10E3/UL
NRBC BLD AUTO-RTO: 0 /100
P AXIS - MUSE: 48 DEGREES
PLATELET # BLD AUTO: 125 10E3/UL (ref 150–450)
POTASSIUM SERPL-SCNC: 4.5 MMOL/L (ref 3.4–5.3)
PR INTERVAL - MUSE: 158 MS
QRS DURATION - MUSE: 80 MS
QT - MUSE: 354 MS
QTC - MUSE: 398 MS
R AXIS - MUSE: -11 DEGREES
RBC # BLD AUTO: 4.13 10E6/UL (ref 4.4–5.9)
SODIUM SERPL-SCNC: 132 MMOL/L (ref 135–145)
SYSTOLIC BLOOD PRESSURE - MUSE: 152 MMHG
T AXIS - MUSE: 35 DEGREES
TROPONIN T SERPL HS-MCNC: 11 NG/L
TROPONIN T SERPL HS-MCNC: 9 NG/L
VENTRICULAR RATE- MUSE: 76 BPM
WBC # BLD AUTO: 7.8 10E3/UL (ref 4–11)

## 2023-10-19 PROCEDURE — 93005 ELECTROCARDIOGRAM TRACING: CPT | Mod: 59 | Performed by: STUDENT IN AN ORGANIZED HEALTH CARE EDUCATION/TRAINING PROGRAM

## 2023-10-19 PROCEDURE — 96360 HYDRATION IV INFUSION INIT: CPT | Mod: 59

## 2023-10-19 PROCEDURE — 99285 EMERGENCY DEPT VISIT HI MDM: CPT | Mod: 25

## 2023-10-19 PROCEDURE — 82310 ASSAY OF CALCIUM: CPT | Performed by: STUDENT IN AN ORGANIZED HEALTH CARE EDUCATION/TRAINING PROGRAM

## 2023-10-19 PROCEDURE — 51702 INSERT TEMP BLADDER CATH: CPT

## 2023-10-19 PROCEDURE — 84484 ASSAY OF TROPONIN QUANT: CPT | Performed by: STUDENT IN AN ORGANIZED HEALTH CARE EDUCATION/TRAINING PROGRAM

## 2023-10-19 PROCEDURE — 82962 GLUCOSE BLOOD TEST: CPT

## 2023-10-19 PROCEDURE — 85025 COMPLETE CBC W/AUTO DIFF WBC: CPT | Performed by: STUDENT IN AN ORGANIZED HEALTH CARE EDUCATION/TRAINING PROGRAM

## 2023-10-19 PROCEDURE — 83735 ASSAY OF MAGNESIUM: CPT | Performed by: STUDENT IN AN ORGANIZED HEALTH CARE EDUCATION/TRAINING PROGRAM

## 2023-10-19 PROCEDURE — 36415 COLL VENOUS BLD VENIPUNCTURE: CPT | Performed by: STUDENT IN AN ORGANIZED HEALTH CARE EDUCATION/TRAINING PROGRAM

## 2023-10-19 PROCEDURE — 258N000003 HC RX IP 258 OP 636: Performed by: STUDENT IN AN ORGANIZED HEALTH CARE EDUCATION/TRAINING PROGRAM

## 2023-10-19 PROCEDURE — 96361 HYDRATE IV INFUSION ADD-ON: CPT

## 2023-10-19 PROCEDURE — 51798 US URINE CAPACITY MEASURE: CPT

## 2023-10-19 RX ADMIN — SODIUM CHLORIDE, POTASSIUM CHLORIDE, SODIUM LACTATE AND CALCIUM CHLORIDE 1000 ML: 600; 310; 30; 20 INJECTION, SOLUTION INTRAVENOUS at 20:21

## 2023-10-19 RX ADMIN — SODIUM CHLORIDE 1000 ML: 9 INJECTION, SOLUTION INTRAVENOUS at 19:07

## 2023-10-19 ASSESSMENT — ACTIVITIES OF DAILY LIVING (ADL)
ADLS_ACUITY_SCORE: 35
ADLS_ACUITY_SCORE: 35

## 2023-10-19 NOTE — ED NOTES
Pt sitting in chair waiting for w/c when pt became unresponsive.  No response to verbal and or painful stimuli.  Pt's son who was getting w/c saw dad and had a syncopal episode .  Provider called to triage stat. Pt remained unresponsive for about 30 secs.

## 2023-10-19 NOTE — ED NOTES
Bed: JNED-03  Expected date: 10/19/23  Expected time: 2:54 PM  Means of arrival:   Comments:  CRASH

## 2023-10-19 NOTE — ED TRIAGE NOTES
Pt coming in with c/o accidentally pulling catheter out.  Pt states he stepped on tubing and catheter came out, balloon still inflated. Some bleeding.  Pt feeling lightheaded as he was exiting triage.  W/C obtained for pt.

## 2023-10-19 NOTE — ED PROVIDER NOTES
EMERGENCY DEPARTMENT ENCOUNTER      NAME: Venkatesh Alvarado  AGE: 72 year old male  YOB: 1950  MRN: [id number]  EVALUATION DATE & TIME: 10/19/2023  6:54 PM    PCP: Siva Rios    ED PROVIDER: Jm Boogie MD      Chief Complaint   Patient presents with    Catheter Problem         FINAL IMPRESSION:  No diagnosis found.      ED COURSE & MEDICAL DECISION MAKING:    Pertinent Labs & Imaging studies reviewed. (See chart for details)  72 year old male presents to the Emergency Department for evaluation of dislodged urinary catheter as well as syncope.    Patient was at home earlier today when he accidentally caught his catheter on something it got dislodged.  Additionally while in triage and upon standing to exit triage he became very lightheaded and had a syncopal event.  He was brought back to the emergency department resuscitation for immediate evaluation.  Once lying flat patient's lightheadedness improved significantly.  He denies any chest pain or shortness of breath at the moment.  Denies any recent fevers although he has had on and off lightheadedness for the past few days and thinks has been drinking less than usual.  Prior to the Jaimes catheter being dislodged denies any hematuria or significant abdominal tenderness.    On initial evaluation patient does appear somewhat pale but is otherwise in no acute distress.  His heart is regular rate and rhythm.  Good peripheral pulses.  Abdomen soft nontender nondistended.  Jaimes catheter is been dislodged but no bleeding from urethral meatus.  No suprapubic tenderness.  No abdominal tenderness otherwise.    Suspect probable vasovagal syncope versus orthostatic syncope but will obtain EKG and screening labs .  We will also attempt to replace Jaimes catheter.    Labs reviewed interpreted myself.  Mild hyponatremia noted and elevated creatinine to 1.28.  No significant anemia.  Magnesium within normal limits.   Initial troponin is 11 downtrending to 9 on  repeat.  EKG does not show any significant dysrhythmias or signs of ischemia.  Initial difficulty passing traditional Jaimes catheter but nursing was able to pass a coudé catheter.  Spontaneous return of urine and patient tolerated this well..    Upon reevaluation patient has no significant symptoms.  He received a liter of fluids with improvement of his lightheadedness and he is able to ambulate safely here without any recurrence of symptoms.  Feels comfortable with discharge home and plans to follow-up in the outpatient setting with his urologist.  To return to the emergency department if he develops fevers, the Jaimes catheter stops passing urine or he has any other new or concerning symptoms.         6:45 PM I was called out to triage. I met and evaluated the patient.       Medical Decision Making    History:  Supplemental history from: Documented in chart, if applicable  External Record(s) reviewed: Documented in chart, if applicable.    Work Up:  Chart documentation includes differential considered and any EKGs or imaging independently interpreted by provider, where specified.  In additional to work up documented, I considered the following work up: Documented in chart, if applicable.    External consultation:  Discussion of management with another provider: Documented in chart, if applicable    Complicating factors:  Care impacted by chronic illness: Cancer/Chemotherapy, Hypertension, and Other: BPH  Care affected by social determinants of health: Access to Medical Care    Disposition considerations: Discharge. No recommendations on prescription strength medication(s). See documentation for any additional details.       At the conclusion of the encounter I discussed the results of all of the tests and the disposition. The questions were answered. The patient or family acknowledged understanding and was agreeable with the care plan.     0 minutes of critical care time     MEDICATIONS GIVEN IN THE  EMERGENCY:  Medications   sodium chloride 0.9% BOLUS 1,000 mL (has no administration in time range)       NEW PRESCRIPTIONS STARTED AT TODAY'S ER VISIT  New Prescriptions    No medications on file          =================================================================    [facility]    Patient information was obtained from: patient    Use of : N/A         Venkatesh Alvarado is a 72 year old male with a pertinent history of prostate cancer, BPH, HTN, anemia chemotherapy induced,who presents to this ED by walk-in with son for evaluation of catheter problem.    Patient reports at 3 PM after coming out of the shower, he accidentally stepped onto his catheter tubing and it proceeded to fall out with the balloon still inflated. Patient did have some associated bleeding when it fell out. Due to this, patient chose to come into the ED. In triage, patient reported feeling light headed and proceeded to have a syncopal episode after trying to exit out of triage. Patient was slumped into his chair but there was no head trauma. Patient reported persistent constant light headedness for the past couple of days. Patient has never had a syncopal episode before.    He otherwise denies associating chest pain, shortness of breath, vomiting, nausea, or abdominal pain. There were no other concerns/complaints at this time.      REVIEW OF SYSTEMS   Refer to the [facility]    PAST MEDICAL HISTORY:  Past Medical History:   Diagnosis Date    Cancer (H)     prostate    Osteoarthritis        PAST SURGICAL HISTORY:  Past Surgical History:   Procedure Laterality Date    INGUINAL HERNIA REPAIR Right     IR CVC TUNNEL PLACEMENT > 5 YRS OF AGE  10/29/2021    IR CVC TUNNEL REMOVAL RIGHT  11/22/2021    PICC AND MIDLINE TEAM LINE INSERTION  9/5/2019                CURRENT MEDICATIONS:    acetaminophen (TYLENOL) 325 MG tablet  amLODIPine (NORVASC) 2.5 MG tablet  amLODIPine (NORVASC) 2.5 MG tablet  denosumab (XGEVA) 120 mg/1.7 mL (70 mg/mL) Soln  docusate  sodium (COLACE) 100 MG capsule  gabapentin (NEURONTIN) 100 MG capsule  HYDROcodone-acetaminophen (NORCO) 5-325 MG tablet  leuprolide (ELIGARD) 45 MG kit  pantoprazole (PROTONIX) 40 MG EC tablet  polyethylene glycol (MIRALAX) 17 g packet        ALLERGIES:  No Known Allergies    FAMILY HISTORY:  Family History   Problem Relation Age of Onset    Cancer Mother     CABG Father     Cerebrovascular Disease Father        SOCIAL HISTORY:   Social History     Socioeconomic History    Marital status: Single   Tobacco Use    Smoking status: Former     Packs/day: 0     Types: Cigarettes    Smokeless tobacco: Never    Tobacco comments:     Quit 30 years ago.   Substance and Sexual Activity    Alcohol use: Yes     Alcohol/week: 1.0 - 3.0 standard drink of alcohol     Comment: Alcoholic Drinks/day: 3 times a week.    Drug use: No    Sexual activity: Not Currently     Partners: Female       VITALS:  BP 94/52   Pulse 74   Temp 98.4  F (36.9  C) (Oral)   Resp 16   Wt 93 kg (205 lb)   SpO2 96%   BMI 31.63 kg/m      PHYSICAL EXAM    Constitutional: Well developed, Well nourished, NAD, Pale appearing.  HENT: Normocephalic, Atraumatic,  mucous membranes moist,   Neck- trachea midline, No stridor.    Eyes:EOMI, Conjunctiva normal, No discharge.   Respiratory: Normal breath sounds, No respiratory distress, No wheezing.    Cardiovascular: Normal heart rate, Regular rhythm,  No murmurs,   Abdominal: Soft, mildly distended, No tenderness, No rebound or guarding.     Musculoskeletal: no deformity or malalignment. Normal peripheral perfusion.  Integument: Warm, Dry, No erythema, No rash.   Neurologic: Alert & oriented x 3   Psychiatric: Affect normal, Cooperative.      LAB:  All pertinent labs reviewed and interpreted.       RADIOLOGY:  Reviewed all pertinent imaging. Please see official radiology report.  No orders to display       EKG:    Performed at: 1859    Impression: EKG shows sinus rhythm at 76 beats a minute, normal axis, normal  AZ, QRS and QTc durations, no ST elevation or ischemic T wave changes. Compared with prior EKG T wave inversion in lead III and aVF has resolved.        I have independently reviewed and interpreted the EKG(s) documented above.    PROCEDURES:   None        I, Danuta Vazquez, am serving as a scribe to document services personally performed by Jm Boogie MD based on my observation and the provider's statements to me. I, Jm Boogie MD, attest that Danuta Vazquez is acting in a scribe capacity, has observed my performance of the services and has documented them in accordance with my direction.    Jm Boogie MD  [facility] EMERGENCY DEPARTMENT  [facility] [facility] [address] [phone]      Jm Boogie MD  10/23/23 2337

## 2023-10-20 ENCOUNTER — TRANSFERRED RECORDS (OUTPATIENT)
Dept: HEALTH INFORMATION MANAGEMENT | Facility: CLINIC | Age: 73
End: 2023-10-20
Payer: MEDICARE

## 2023-10-20 NOTE — ED NOTES
Writer attempted to place 16 Yoruba carlson without success. Writer met resistance at prostate, unable to pass. Pt is currently being treated for prostate cancer. MD Boogie updated.

## 2023-10-21 ENCOUNTER — APPOINTMENT (OUTPATIENT)
Dept: CT IMAGING | Facility: HOSPITAL | Age: 73
DRG: 698 | End: 2023-10-21
Attending: EMERGENCY MEDICINE
Payer: MEDICARE

## 2023-10-21 ENCOUNTER — HOSPITAL ENCOUNTER (INPATIENT)
Facility: HOSPITAL | Age: 73
LOS: 3 days | Discharge: HOME | DRG: 698 | End: 2023-10-24
Attending: EMERGENCY MEDICINE | Admitting: INTERNAL MEDICINE
Payer: MEDICARE

## 2023-10-21 ENCOUNTER — APPOINTMENT (OUTPATIENT)
Dept: RADIOLOGY | Facility: HOSPITAL | Age: 73
DRG: 698 | End: 2023-10-21
Attending: EMERGENCY MEDICINE
Payer: MEDICARE

## 2023-10-21 DIAGNOSIS — K59.00 CONSTIPATION, UNSPECIFIED CONSTIPATION TYPE: Primary | ICD-10-CM

## 2023-10-21 DIAGNOSIS — T83.511A URINARY TRACT INFECTION ASSOCIATED WITH CATHETERIZATION OF URINARY TRACT, UNSPECIFIED INDWELLING URINARY CATHETER TYPE, INITIAL ENCOUNTER (H): ICD-10-CM

## 2023-10-21 DIAGNOSIS — N39.0 URINARY TRACT INFECTION ASSOCIATED WITH CATHETERIZATION OF URINARY TRACT, UNSPECIFIED INDWELLING URINARY CATHETER TYPE, INITIAL ENCOUNTER (H): ICD-10-CM

## 2023-10-21 DIAGNOSIS — G93.40 ACUTE ENCEPHALOPATHY: ICD-10-CM

## 2023-10-21 DIAGNOSIS — I10 ESSENTIAL HYPERTENSION: ICD-10-CM

## 2023-10-21 DIAGNOSIS — R50.9 FEVER, UNSPECIFIED FEVER CAUSE: ICD-10-CM

## 2023-10-21 LAB
ALBUMIN SERPL BCG-MCNC: 3.9 G/DL (ref 3.5–5.2)
ALBUMIN UR-MCNC: 100 MG/DL
ALBUMIN UR-MCNC: 200 MG/DL
ALP SERPL-CCNC: 74 U/L (ref 40–129)
ALT SERPL W P-5'-P-CCNC: 17 U/L (ref 0–70)
ANION GAP SERPL CALCULATED.3IONS-SCNC: 11 MMOL/L (ref 7–15)
APPEARANCE CSF: CLEAR
APPEARANCE UR: ABNORMAL
APPEARANCE UR: CLEAR
APTT PPP: 35 SECONDS (ref 22–38)
AST SERPL W P-5'-P-CCNC: 29 U/L (ref 0–45)
BACTERIA #/AREA URNS HPF: ABNORMAL /HPF
BACTERIA #/AREA URNS HPF: ABNORMAL /HPF
BASE EXCESS BLDV CALC-SCNC: -1.6 MMOL/L
BASO+EOS+MONOS # BLD AUTO: ABNORMAL 10*3/UL
BASO+EOS+MONOS NFR BLD AUTO: ABNORMAL %
BASOPHILS # BLD AUTO: 0 10E3/UL (ref 0–0.2)
BASOPHILS NFR BLD AUTO: 0 %
BILIRUB SERPL-MCNC: 0.5 MG/DL
BILIRUB UR QL STRIP: NEGATIVE
BILIRUB UR QL STRIP: NEGATIVE
BUN SERPL-MCNC: 24 MG/DL (ref 8–23)
CALCIUM SERPL-MCNC: 8.2 MG/DL (ref 8.8–10.2)
CHLORIDE SERPL-SCNC: 100 MMOL/L (ref 98–107)
COLOR CSF: COLORLESS
COLOR UR AUTO: ABNORMAL
COLOR UR AUTO: YELLOW
CREAT SERPL-MCNC: 1.2 MG/DL (ref 0.67–1.17)
DEPRECATED HCO3 PLAS-SCNC: 22 MMOL/L (ref 22–29)
EGFRCR SERPLBLD CKD-EPI 2021: 65 ML/MIN/1.73M2
EOSINOPHIL # BLD AUTO: 0 10E3/UL (ref 0–0.7)
EOSINOPHIL NFR BLD AUTO: 0 %
ERYTHROCYTE [DISTWIDTH] IN BLOOD BY AUTOMATED COUNT: 12.7 % (ref 10–15)
FLUAV RNA SPEC QL NAA+PROBE: NEGATIVE
FLUBV RNA RESP QL NAA+PROBE: NEGATIVE
GLUCOSE CSF-MCNC: 72 MG/DL (ref 40–70)
GLUCOSE SERPL-MCNC: 122 MG/DL (ref 70–99)
GLUCOSE UR STRIP-MCNC: NEGATIVE MG/DL
GLUCOSE UR STRIP-MCNC: NEGATIVE MG/DL
HCO3 BLDV-SCNC: 22 MMOL/L (ref 24–30)
HCT VFR BLD AUTO: 36.1 % (ref 40–53)
HGB BLD-MCNC: 12.8 G/DL (ref 13.3–17.7)
HGB UR QL STRIP: ABNORMAL
HGB UR QL STRIP: ABNORMAL
HOLD SPECIMEN: NORMAL
IMM GRANULOCYTES # BLD: 0 10E3/UL
IMM GRANULOCYTES NFR BLD: 1 %
INR PPP: 1.57 (ref 0.85–1.15)
KETONES UR STRIP-MCNC: 40 MG/DL
KETONES UR STRIP-MCNC: 40 MG/DL
LACTATE SERPL-SCNC: 0.8 MMOL/L (ref 0.7–2)
LEUKOCYTE ESTERASE UR QL STRIP: ABNORMAL
LEUKOCYTE ESTERASE UR QL STRIP: ABNORMAL
LYMPHOCYTES # BLD AUTO: 0.1 10E3/UL (ref 0.8–5.3)
LYMPHOCYTES NFR BLD AUTO: 4 %
MCH RBC QN AUTO: 33.5 PG (ref 26.5–33)
MCHC RBC AUTO-ENTMCNC: 35.5 G/DL (ref 31.5–36.5)
MCV RBC AUTO: 95 FL (ref 78–100)
MONOCYTES # BLD AUTO: 0.2 10E3/UL (ref 0–1.3)
MONOCYTES NFR BLD AUTO: 8 %
MUCOUS THREADS #/AREA URNS LPF: PRESENT /LPF
MUCOUS THREADS #/AREA URNS LPF: PRESENT /LPF
NEUTROPHILS # BLD AUTO: 2.8 10E3/UL (ref 1.6–8.3)
NEUTROPHILS NFR BLD AUTO: 87 %
NITRATE UR QL: NEGATIVE
NITRATE UR QL: NEGATIVE
NRBC # BLD AUTO: 0 10E3/UL
NRBC BLD AUTO-RTO: 0 /100
OXYHGB MFR BLDV: 62.5 % (ref 70–75)
PCO2 BLDV: 32 MM HG (ref 35–50)
PH BLDV: 7.45 [PH] (ref 7.35–7.45)
PH UR STRIP: 5.5 [PH] (ref 5–7)
PH UR STRIP: 5.5 [PH] (ref 5–7)
PLATELET # BLD AUTO: 71 10E3/UL (ref 150–450)
PO2 BLDV: 32 MM HG (ref 25–47)
POTASSIUM SERPL-SCNC: 3.8 MMOL/L (ref 3.4–5.3)
PROT CSF-MCNC: 35.1 MG/DL (ref 15–45)
PROT SERPL-MCNC: 6.8 G/DL (ref 6.4–8.3)
RBC # BLD AUTO: 3.82 10E6/UL (ref 4.4–5.9)
RBC # CSF MANUAL: 2 /UL (ref 0–2)
RBC URINE: 16 /HPF
RBC URINE: 5 /HPF
RSV RNA SPEC NAA+PROBE: NEGATIVE
SAO2 % BLDV: 63.6 % (ref 70–75)
SARS-COV-2 RNA RESP QL NAA+PROBE: NEGATIVE
SODIUM SERPL-SCNC: 133 MMOL/L (ref 135–145)
SP GR UR STRIP: 1.02 (ref 1–1.03)
SP GR UR STRIP: 1.02 (ref 1–1.03)
SQUAMOUS EPITHELIAL: <1 /HPF
TROPONIN T SERPL HS-MCNC: 14 NG/L
TSH SERPL DL<=0.005 MIU/L-ACNC: 1.71 UIU/ML (ref 0.3–4.2)
TUBE # CSF: 4
UROBILINOGEN UR STRIP-MCNC: <2 MG/DL
UROBILINOGEN UR STRIP-MCNC: <2 MG/DL
WBC # BLD AUTO: 3.2 10E3/UL (ref 4–11)
WBC # CSF MANUAL: 3 /UL (ref 0–5)
WBC CLUMPS #/AREA URNS HPF: PRESENT /HPF
WBC URINE: 10 /HPF
WBC URINE: 22 /HPF

## 2023-10-21 PROCEDURE — 81001 URINALYSIS AUTO W/SCOPE: CPT | Performed by: EMERGENCY MEDICINE

## 2023-10-21 PROCEDURE — 272N000452 HC KIT SHRLOCK 5FR POWER PICC TRIPLE LUMEN

## 2023-10-21 PROCEDURE — 70450 CT HEAD/BRAIN W/O DYE: CPT | Mod: MA

## 2023-10-21 PROCEDURE — 009U3ZZ DRAINAGE OF SPINAL CANAL, PERCUTANEOUS APPROACH: ICD-10-PCS | Performed by: RADIOLOGY

## 2023-10-21 PROCEDURE — 36415 COLL VENOUS BLD VENIPUNCTURE: CPT | Performed by: EMERGENCY MEDICINE

## 2023-10-21 PROCEDURE — 80053 COMPREHEN METABOLIC PANEL: CPT | Performed by: EMERGENCY MEDICINE

## 2023-10-21 PROCEDURE — 87205 SMEAR GRAM STAIN: CPT | Performed by: EMERGENCY MEDICINE

## 2023-10-21 PROCEDURE — 258N000003 HC RX IP 258 OP 636: Performed by: EMERGENCY MEDICINE

## 2023-10-21 PROCEDURE — 36569 INSJ PICC 5 YR+ W/O IMAGING: CPT

## 2023-10-21 PROCEDURE — 84443 ASSAY THYROID STIM HORMONE: CPT | Performed by: EMERGENCY MEDICINE

## 2023-10-21 PROCEDURE — 82805 BLOOD GASES W/O2 SATURATION: CPT | Performed by: EMERGENCY MEDICINE

## 2023-10-21 PROCEDURE — 62328 DX LMBR SPI PNXR W/FLUOR/CT: CPT

## 2023-10-21 PROCEDURE — 87637 SARSCOV2&INF A&B&RSV AMP PRB: CPT | Performed by: EMERGENCY MEDICINE

## 2023-10-21 PROCEDURE — 250N000011 HC RX IP 250 OP 636: Mod: JZ | Performed by: EMERGENCY MEDICINE

## 2023-10-21 PROCEDURE — 250N000013 HC RX MED GY IP 250 OP 250 PS 637: Performed by: EMERGENCY MEDICINE

## 2023-10-21 PROCEDURE — 250N000011 HC RX IP 250 OP 636: Performed by: EMERGENCY MEDICINE

## 2023-10-21 PROCEDURE — 99223 1ST HOSP IP/OBS HIGH 75: CPT | Performed by: INTERNAL MEDICINE

## 2023-10-21 PROCEDURE — 96365 THER/PROPH/DIAG IV INF INIT: CPT

## 2023-10-21 PROCEDURE — 87088 URINE BACTERIA CULTURE: CPT | Performed by: EMERGENCY MEDICINE

## 2023-10-21 PROCEDURE — 250N000011 HC RX IP 250 OP 636: Performed by: INTERNAL MEDICINE

## 2023-10-21 PROCEDURE — 85610 PROTHROMBIN TIME: CPT | Performed by: EMERGENCY MEDICINE

## 2023-10-21 PROCEDURE — 89050 BODY FLUID CELL COUNT: CPT | Performed by: EMERGENCY MEDICINE

## 2023-10-21 PROCEDURE — 250N000009 HC RX 250: Performed by: EMERGENCY MEDICINE

## 2023-10-21 PROCEDURE — 85025 COMPLETE CBC W/AUTO DIFF WBC: CPT | Performed by: EMERGENCY MEDICINE

## 2023-10-21 PROCEDURE — 83605 ASSAY OF LACTIC ACID: CPT | Performed by: EMERGENCY MEDICINE

## 2023-10-21 PROCEDURE — 87070 CULTURE OTHR SPECIMN AEROBIC: CPT | Performed by: EMERGENCY MEDICINE

## 2023-10-21 PROCEDURE — 87483 CNS DNA AMP PROBE TYPE 12-25: CPT | Performed by: EMERGENCY MEDICINE

## 2023-10-21 PROCEDURE — 99285 EMERGENCY DEPT VISIT HI MDM: CPT | Mod: 25

## 2023-10-21 PROCEDURE — 120N000001 HC R&B MED SURG/OB

## 2023-10-21 PROCEDURE — 84484 ASSAY OF TROPONIN QUANT: CPT | Performed by: EMERGENCY MEDICINE

## 2023-10-21 PROCEDURE — 258N000003 HC RX IP 258 OP 636: Performed by: INTERNAL MEDICINE

## 2023-10-21 PROCEDURE — 74177 CT ABD & PELVIS W/CONTRAST: CPT | Mod: MA

## 2023-10-21 PROCEDURE — 85730 THROMBOPLASTIN TIME PARTIAL: CPT | Performed by: EMERGENCY MEDICINE

## 2023-10-21 PROCEDURE — 87040 BLOOD CULTURE FOR BACTERIA: CPT | Performed by: EMERGENCY MEDICINE

## 2023-10-21 PROCEDURE — 84157 ASSAY OF PROTEIN OTHER: CPT | Performed by: EMERGENCY MEDICINE

## 2023-10-21 PROCEDURE — 87529 HSV DNA AMP PROBE: CPT | Performed by: EMERGENCY MEDICINE

## 2023-10-21 PROCEDURE — 93005 ELECTROCARDIOGRAM TRACING: CPT | Performed by: EMERGENCY MEDICINE

## 2023-10-21 PROCEDURE — 250N000013 HC RX MED GY IP 250 OP 250 PS 637: Performed by: INTERNAL MEDICINE

## 2023-10-21 PROCEDURE — 82945 GLUCOSE OTHER FLUID: CPT | Performed by: EMERGENCY MEDICINE

## 2023-10-21 RX ORDER — NALOXONE HYDROCHLORIDE 0.4 MG/ML
0.4 INJECTION, SOLUTION INTRAMUSCULAR; INTRAVENOUS; SUBCUTANEOUS
Status: DISCONTINUED | OUTPATIENT
Start: 2023-10-21 | End: 2023-10-24 | Stop reason: HOSPADM

## 2023-10-21 RX ORDER — NALOXONE HYDROCHLORIDE 0.4 MG/ML
0.2 INJECTION, SOLUTION INTRAMUSCULAR; INTRAVENOUS; SUBCUTANEOUS
Status: DISCONTINUED | OUTPATIENT
Start: 2023-10-21 | End: 2023-10-24 | Stop reason: HOSPADM

## 2023-10-21 RX ORDER — ACETAMINOPHEN 650 MG/1
650 SUPPOSITORY RECTAL EVERY 6 HOURS PRN
Status: DISCONTINUED | OUTPATIENT
Start: 2023-10-21 | End: 2023-10-24 | Stop reason: HOSPADM

## 2023-10-21 RX ORDER — AMPICILLIN 2 G/1
2 INJECTION, POWDER, FOR SOLUTION INTRAVENOUS EVERY 4 HOURS
Status: DISCONTINUED | OUTPATIENT
Start: 2023-10-21 | End: 2023-10-22

## 2023-10-21 RX ORDER — CEFAZOLIN SODIUM 1 G/50ML
1750 SOLUTION INTRAVENOUS ONCE
Status: COMPLETED | OUTPATIENT
Start: 2023-10-21 | End: 2023-10-21

## 2023-10-21 RX ORDER — SODIUM CHLORIDE 9 MG/ML
INJECTION, SOLUTION INTRAVENOUS CONTINUOUS
Status: DISCONTINUED | OUTPATIENT
Start: 2023-10-21 | End: 2023-10-22

## 2023-10-21 RX ORDER — ACETAMINOPHEN 325 MG/1
650 TABLET ORAL ONCE
Status: COMPLETED | OUTPATIENT
Start: 2023-10-21 | End: 2023-10-21

## 2023-10-21 RX ORDER — ACETAMINOPHEN 325 MG/1
650 TABLET ORAL EVERY 6 HOURS PRN
Status: DISCONTINUED | OUTPATIENT
Start: 2023-10-21 | End: 2023-10-24 | Stop reason: HOSPADM

## 2023-10-21 RX ORDER — ONDANSETRON 4 MG/1
4 TABLET, ORALLY DISINTEGRATING ORAL EVERY 6 HOURS PRN
Status: DISCONTINUED | OUTPATIENT
Start: 2023-10-21 | End: 2023-10-24 | Stop reason: HOSPADM

## 2023-10-21 RX ORDER — IOPAMIDOL 755 MG/ML
90 INJECTION, SOLUTION INTRAVASCULAR ONCE
Status: COMPLETED | OUTPATIENT
Start: 2023-10-21 | End: 2023-10-21

## 2023-10-21 RX ORDER — LIDOCAINE 40 MG/G
CREAM TOPICAL
Status: ACTIVE | OUTPATIENT
Start: 2023-10-21 | End: 2023-10-24

## 2023-10-21 RX ORDER — CEFTRIAXONE 1 G/1
1 INJECTION, POWDER, FOR SOLUTION INTRAMUSCULAR; INTRAVENOUS EVERY 24 HOURS
Status: DISCONTINUED | OUTPATIENT
Start: 2023-10-21 | End: 2023-10-21

## 2023-10-21 RX ORDER — LIDOCAINE HYDROCHLORIDE 20 MG/ML
20 JELLY TOPICAL
Status: DISCONTINUED | OUTPATIENT
Start: 2023-10-21 | End: 2023-10-24 | Stop reason: HOSPADM

## 2023-10-21 RX ORDER — CEFTRIAXONE 2 G/1
2 INJECTION, POWDER, FOR SOLUTION INTRAMUSCULAR; INTRAVENOUS EVERY 12 HOURS
Status: DISCONTINUED | OUTPATIENT
Start: 2023-10-22 | End: 2023-10-24

## 2023-10-21 RX ORDER — CEFTRIAXONE 1 G/1
1 INJECTION, POWDER, FOR SOLUTION INTRAMUSCULAR; INTRAVENOUS EVERY 12 HOURS
Status: DISCONTINUED | OUTPATIENT
Start: 2023-10-21 | End: 2023-10-21

## 2023-10-21 RX ORDER — CEFTRIAXONE 2 G/1
2 INJECTION, POWDER, FOR SOLUTION INTRAMUSCULAR; INTRAVENOUS ONCE
Status: COMPLETED | OUTPATIENT
Start: 2023-10-21 | End: 2023-10-21

## 2023-10-21 RX ORDER — ONDANSETRON 2 MG/ML
4 INJECTION INTRAMUSCULAR; INTRAVENOUS EVERY 6 HOURS PRN
Status: DISCONTINUED | OUTPATIENT
Start: 2023-10-21 | End: 2023-10-24 | Stop reason: HOSPADM

## 2023-10-21 RX ADMIN — AMPICILLIN SODIUM 2 G: 2 INJECTION, POWDER, FOR SOLUTION INTRAMUSCULAR; INTRAVENOUS at 19:04

## 2023-10-21 RX ADMIN — AMPICILLIN SODIUM 2 G: 2 INJECTION, POWDER, FOR SOLUTION INTRAMUSCULAR; INTRAVENOUS at 23:18

## 2023-10-21 RX ADMIN — IOPAMIDOL 90 ML: 755 INJECTION, SOLUTION INTRAVENOUS at 16:09

## 2023-10-21 RX ADMIN — CEFTRIAXONE SODIUM 2 G: 2 INJECTION, POWDER, FOR SOLUTION INTRAMUSCULAR; INTRAVENOUS at 14:05

## 2023-10-21 RX ADMIN — ACETAMINOPHEN 650 MG: 325 TABLET ORAL at 16:57

## 2023-10-21 RX ADMIN — ACETAMINOPHEN 650 MG: 325 TABLET ORAL at 22:31

## 2023-10-21 RX ADMIN — SODIUM CHLORIDE: 9 INJECTION, SOLUTION INTRAVENOUS at 13:49

## 2023-10-21 RX ADMIN — LIDOCAINE HYDROCHLORIDE 2 ML: 10 INJECTION, SOLUTION EPIDURAL; INFILTRATION; INTRACAUDAL; PERINEURAL at 14:28

## 2023-10-21 RX ADMIN — VANCOMYCIN HYDROCHLORIDE 1750 MG: 5 INJECTION, POWDER, LYOPHILIZED, FOR SOLUTION INTRAVENOUS at 20:50

## 2023-10-21 ASSESSMENT — ACTIVITIES OF DAILY LIVING (ADL)
ADLS_ACUITY_SCORE: 35
ADLS_ACUITY_SCORE: 35
ADLS_ACUITY_SCORE: 24
ADLS_ACUITY_SCORE: 22
ADLS_ACUITY_SCORE: 35
ADLS_ACUITY_SCORE: 35

## 2023-10-21 ASSESSMENT — ENCOUNTER SYMPTOMS: WEAKNESS: 1

## 2023-10-21 NOTE — ED TRIAGE NOTES
Patient arrived via EMS with complaints of weakness. Patient has history of cancer and chronic Jaimes catheter. No medications given by EMS.     Triage Assessment (Adult)       Row Name 10/21/23 1317          Triage Assessment    Airway WDL WDL        Respiratory WDL    Respiratory WDL WDL        Skin Circulation/Temperature WDL    Skin Circulation/Temperature WDL WDL        Cardiac WDL    Cardiac WDL WDL     Pulse Rate & Regularity radial pulse regular        Peripheral/Neurovascular WDL    Peripheral Neurovascular WDL WDL        Cognitive/Neuro/Behavioral WDL    Cognitive/Neuro/Behavioral WDL orientation     Orientation disoriented to;place;time;situation

## 2023-10-21 NOTE — ED NOTES
[facility]  Expected date:   Expected time:   Means of arrival:   Comments:  MPWD- 73yo M Weakness, Cancer pt.  VSS.

## 2023-10-21 NOTE — ED NOTES
EMERGENCY DEPARTMENT SIGN OUT NOTE        ED COURSE AND MEDICAL DECISION MAKING  Patient was signed out to me by Dr Iva Sood at 2:30 PM    In brief, Venkatesh Alvarado is a 72 year old male who initially presented with generalized weakness. Today, the patient was found by family members sleeping at home. When he was woken up, he was unable to get up on his own secondary to weakness. The patient has prostate cancer and has been receiving treatment at [facility]. His brother notes that the patient has not been feeling well recently, and that his symptoms are similar to flu symptoms.     At time of sign out, disposition was pending imaging results.    Followed up on CT chest abdomen pelvis as well as head CT which was negative for any cause of the patient's encephalopathy or any clear source of fever and sepsis.  He was continued on some fluids ordered from the previous provider and received some Tylenol, fever subsequently improved and his mental status seem to be improving somewhat however he remains fairly encephalopathic.  Urinary catheter was exchanged, he still has some pyuria and hematuria, possibly a source of infection.  Discussed case with hospitalist.  In the absence of any other source of fever, they had reviewed things with the infectious disease provider who suggested we obtain a lumbar puncture to definitively rule out CNS infection as a cause of the patient's symptoms.  I did attempt this at the bedside here which was unsuccessful despite a couple of attempts.  I contacted interventional radiology who are going to try to get the patient set up for a fluoroscopy guided LP yet this evening.  Hospitalist is going to place him on antibiotics empirically for CNS infection in the meantime.  Updated the patient's brother and son at the bedside.  Discussed case with hospitalist Dr. Campuzano.      FINAL IMPRESSION    1. Fever, unspecified fever cause    2. Urinary tract infection associated with  catheterization of urinary tract, unspecified indwelling urinary catheter type, initial encounter     3. Acute encephalopathy        PROCEDURE: Lumbar Puncture   INDICATION: fever and confusion   PROCEDURE PROVIDER: Dr Vicente Lee   CONSENT: Risks, benefits and alternatives were discussed with and Written consent was obtained from Brother and Patient.   TIME OUT: Universal protocol was followed. TIME OUT conducted just prior to starting procedure confirmed patient identity, site/side, procedure, patient position, and availability of correct equipment. Yes   NOTE: Patient was placed in a sitting, supported by bedside stand position and the iliac crests were palpated. The L4-5 interspace was identified. The area was prepped with chlorhexidine and draped in the usual sterile fashion. 8 mLs of 1% Lidocaine without epinephrine was then injected subcutaneously to provide local anesthesia.  A standard 20 gauge spinal needle was used.  Despite multiple attempts at the L4-L5 and we did the L3-L4 interspace, unable to obtain CSF.  Procedure was aborted after multiple failed attempts.  Direct pressure was applied, patient tolerated procedure.     COMPLICATIONS: Procedure was unsuccessful, no immediate complications         ED MEDS  Medications   sodium chloride (PF) 0.9% PF flush 3 mL (has no administration in time range)   sodium chloride (PF) 0.9% PF flush 3 mL (3 mLs Intracatheter $Given 10/21/23 2053)   lidocaine (XYLOCAINE) 2 % external gel 20 mL (has no administration in time range)   sodium chloride 0.9 % infusion ( Intravenous Rate/Dose Change 10/21/23 1908)   lidocaine 1 % 0.1-5 mL (2 mLs Other $Given 10/21/23 1428)   lidocaine (LMX4) cream (has no administration in time range)   sodium chloride (PF) 0.9% PF flush 10-40 mL (has no administration in time range)   melatonin tablet 1 mg (has no administration in time range)   acetaminophen (TYLENOL) tablet 650 mg (650 mg Oral $Given 10/21/23 2231)     Or   acetaminophen  (TYLENOL) Suppository 650 mg ( Rectal See Alternative 10/21/23 2231)   HYDROmorphone (DILAUDID) half-tab 1 mg (has no administration in time range)   ondansetron (ZOFRAN ODT) ODT tab 4 mg (has no administration in time range)     Or   ondansetron (ZOFRAN) injection 4 mg (has no administration in time range)   cefTRIAXone (ROCEPHIN) 2 g vial to attach to  ml bag for ADULTS or NS 50 ml bag for PEDS (has no administration in time range)   ampicillin (OMNIPEN) 2 g vial to attach to  mL bag (2 g Intravenous $New Bag 10/21/23 1904)   vancomycin (VANCOCIN) 1,750 mg in sodium chloride 0.9 % 500 mL intermittent infusion (1,750 mg Intravenous $Given 10/21/23 2050)   vancomycin (VANCOCIN) 1,500 mg in sodium chloride 0.9 % 250 mL intermittent infusion (has no administration in time range)   naloxone (NARCAN) injection 0.2 mg (has no administration in time range)     Or   naloxone (NARCAN) injection 0.4 mg (has no administration in time range)     Or   naloxone (NARCAN) injection 0.2 mg (has no administration in time range)     Or   naloxone (NARCAN) injection 0.4 mg (has no administration in time range)   cefTRIAXone (ROCEPHIN) 2 g vial to attach to  ml bag for ADULTS or NS 50 ml bag for PEDS (0 g Intravenous Stopped 10/21/23 1430)   iopamidol (ISOVUE-370) solution 90 mL (90 mLs Intravenous $Given 10/21/23 1609)   acetaminophen (TYLENOL) tablet 650 mg (650 mg Oral $Given 10/21/23 1657)       LAB  Labs Ordered and Resulted from Time of ED Arrival to Time of ED Departure   ROUTINE UA WITH MICROSCOPIC REFLEX TO CULTURE - Abnormal       Result Value    Color Urine Yellow      Appearance Urine Turbid (*)     Glucose Urine Negative      Bilirubin Urine Negative      Ketones Urine 40 (*)     Specific Gravity Urine 1.025      Blood Urine 1.0 mg/dL (*)     pH Urine 5.5      Protein Albumin Urine 200 (*)     Urobilinogen Urine <2.0      Nitrite Urine Negative      Leukocyte Esterase Urine 250 Louie/uL (*)     Bacteria  Urine Few (*)     Mucus Urine Present (*)     RBC Urine 5 (*)     WBC Urine 22 (*)     Squamous Epithelials Urine <1     COMPREHENSIVE METABOLIC PANEL - Abnormal    Sodium 133 (*)     Potassium 3.8      Carbon Dioxide (CO2) 22      Anion Gap 11      Urea Nitrogen 24.0 (*)     Creatinine 1.20 (*)     GFR Estimate 65      Calcium 8.2 (*)     Chloride 100      Glucose 122 (*)     Alkaline Phosphatase 74      AST 29      ALT 17      Protein Total 6.8      Albumin 3.9      Bilirubin Total 0.5     BLOOD GAS VENOUS - Abnormal    pH Venous 7.45      pCO2 Venous 32 (*)     pO2 Venous 32      Bicarbonate Venous 22 (*)     Base Excess/Deficit -1.6      Oxyhemoglobin Venous 62.5 (*)     O2 Sat, Venous 63.6 (*)    INR - Abnormal    INR 1.57 (*)    CBC WITH PLATELETS AND DIFFERENTIAL - Abnormal    WBC Count 3.2 (*)     RBC Count 3.82 (*)     Hemoglobin 12.8 (*)     Hematocrit 36.1 (*)     MCV 95      MCH 33.5 (*)     MCHC 35.5      RDW 12.7      Platelet Count 71 (*)     % Neutrophils 87      % Lymphocytes 4      % Monocytes 8      Mids % (Monos, Eos, Basos)        % Eosinophils 0      % Basophils 0      % Immature Granulocytes 1      NRBCs per 100 WBC 0      Absolute Neutrophils 2.8      Absolute Lymphocytes 0.1 (*)     Absolute Monocytes 0.2      Mids Abs (Monos, Eos, Basos)        Absolute Eosinophils 0.0      Absolute Basophils 0.0      Absolute Immature Granulocytes 0.0      Absolute NRBCs 0.0     ROUTINE UA WITH MICROSCOPIC REFLEX TO CULTURE - Abnormal    Color Urine Light Yellow      Appearance Urine Clear      Glucose Urine Negative      Bilirubin Urine Negative      Ketones Urine 40 (*)     Specific Gravity Urine 1.025      Blood Urine 1.0 mg/dL (*)     pH Urine 5.5      Protein Albumin Urine 100 (*)     Urobilinogen Urine <2.0      Nitrite Urine Negative      Leukocyte Esterase Urine 25 Louie/uL (*)     Bacteria Urine Few (*)     WBC Clumps Urine Present (*)     Mucus Urine Present (*)     RBC Urine 16 (*)     WBC Urine  10 (*)    LACTIC ACID WHOLE BLOOD - Normal    Lactic Acid 0.8     PARTIAL THROMBOPLASTIN TIME - Normal    aPTT 35     TROPONIN T, HIGH SENSITIVITY - Normal    Troponin T, High Sensitivity 14     INFLUENZA A/B, RSV, & SARS-COV2 PCR - Normal    Influenza A PCR Negative      Influenza B PCR Negative      RSV PCR Negative      SARS CoV2 PCR Negative     TSH WITH FREE T4 REFLEX - Normal    TSH 1.71     URINE CULTURE   BLOOD CULTURE   BLOOD CULTURE   MENINGITIS/ENCEPHALITIS PANEL QUAL PCR CSF   HERPES SIMPLEX VIRUS 1&2 PCR       RADIOLOGY    CT Chest/Abdomen/Pelvis w Contrast   Final Result   IMPRESSION:      1.  No acute abnormality or source of infection identified within the chest, abdomen, or pelvis.      2.  Scattered sclerotic osseous metastases as described. No pathologic fractures.      3.  Cholelithiasis.      4.  Moderate amount of stool throughout the descending and sigmoid colon, suggesting constipation.      Head CT w/o contrast   Final Result   IMPRESSION:   1.  No acute intracranial abnormality. Chronic changes as above. 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       XR Chest 1 View    (Results Pending)   XR Lumbar Puncture Spinal Tap Diag    (Results Pending)       DISCHARGE MEDS  Current Discharge Medication List          Cj Lee MD  [facility] EMERGENCY DEPARTMENT  [facility] [facility] [address] [phone]       Cj Lee MD  10/21/23 5421

## 2023-10-21 NOTE — ED NOTES
"[facility] ED Handoff Report    ED Chief Complaint: Generalized weakness and fever    ED Diagnosis:  (R50.9) Fever, unspecified fever cause  Comment:   Plan:     (T83.511A,  N39.0) Urinary tract infection associated with catheterization of urinary tract, unspecified indwelling urinary catheter type, initial encounter   Comment:   Plan:     (G93.40) Acute encephalopathy  Comment:   Plan:        PMH:    Past Medical History:   Diagnosis Date    Cancer (H)     prostate    Osteoarthritis         Code Status:  Prior     Falls Risk: Yes Band: Applied    Current Living Situation/Residence: lives alone     Elimination Status: Continent: indwelling catheter     Activity Level: 2 assist    Patients Preferred Language:  English     Needed: No    Vital Signs:  /57   Pulse 75   Temp (!) 101.5  F (38.6  C) (Axillary)   Resp 17   Ht 1.727 m (5' 8\")   Wt 88.5 kg (195 lb)   SpO2 97%   BMI 29.65 kg/m       Cardiac Rhythm: NSR    Pain Score: 0/10    Is the Patient Confused:  Yes    Last Food or Drink: 10/21/23 at drinking water    Focused Assessment:      Tests Performed: Done: Labs and Imaging    Treatments Provided:  IVAB and fluids    Family Dynamics/Concerns: No    Family Updated On Visitor Policy: Yes    Plan of Care Communicated to Family: Yes      Belongings Checklist Done and Signed by Patient: Yes    Medications sent with patient: NA    Covid: symptomatic, negative    Additional Information: brother here at bedside    RN: Adelina Frye RN   10/21/2023 5:52 PM      "

## 2023-10-21 NOTE — H&P
Admission History and Physical   Venkatesh Alvarado,    1950,   [id number]    [facility]   Acute encephalopathy [G93.40]    PCP: Siva Rios,    Code status:  Prior       Extended Emergency Contact Information  Primary Emergency Contact: Tim Alvarado   [facility]  Home Phone: [phone]  Mobile Phone: [phone]  Relation: Son  Secondary Emergency Contact: Atif Alvarado   [facility]  Home Phone: [phone]  Mobile Phone: [phone]  Relation: Son       Principal Problem:    Acute encephalopathy  Active Problems:    Fever, unspecified fever cause    Urinary tract infection associated with catheterization of urinary tract, unspecified indwelling urinary catheter type, initial encounter        ASSESSMENT AND PLAN:  Fever with mental status changes, complicated patient on chemotherapy for prostate cancer.  CT unremarkable for infectious etiology.  UA suspicious for infection known chronic Jaimes catheter.  Signs for sepsis with pancytopenia blood cultures pending  Confusion with negative head CT, this is improving in the ED with IV hydration  Prostate cancer with metastasis follows at [facility] on chemotherapy  Chronic Jaimes catheter recently exchanged a few days ago in ED  Abnormal UA suspicious for UTI urine and blood cultures pending  Benign essential hypertension, hold blood pressure meds for now  CKD stage III, avoid nephrotoxins      Plan  Admit to cardiac monitoring  Case discussed with infectious disease regarding high-grade fever, mental status changes.  Clinically no meningeal signs however UTI would not explain high-grade fever to 103-105.  Plan is to proceed with LP tonight and cover with empiric antibiotics, ID consult placed [ED will perform LP tonight]   Begin empiric broad-spectrum IV antibiotics  Follow urine and blood cultures  Neurochecks for now  Hold blood pressure meds temporarily    Full code per discussion with patient and family at bedside      DVT  PPX:  Mechanical    Barriers to discharge    Anticipated Discharge date inpatient multiple days          Chief Complaint:  Fever and confusion 2 days duration    HPI:  Venkatesh Alvarado is a 72 year old old male brought to hospital by 5 members with noted fever and chills and confusion noted today upon arrival at his home.  Patient was unable to get up from bed and was found to be confused.  He was known well yesterday evening according to the brother.  He denies any recent falls or headaches, no skin rashes, no neck stiffness, no diarrhea reported no cough or chest pain.  In the ED UA obtained showed evidence for infection he is admitted for evaluation.  Upon arrival patient is slightly improved and is less confused with IV hydration.      Medical History  Past Medical History:   Diagnosis Date    Cancer (H)     prostate    Osteoarthritis           Surgical History  He  has a past surgical history that includes Inguinal Hernia Repair (Right); Picc And Midline Team Line Insertion (9/5/2019); IR CVC Tunnel Placement > 5 Yrs of Age (10/29/2021); IR CVC Tunnel Removal Right (11/22/2021); and PICC/Midline Placement (10/21/2023).      SOCIAL HISTORY:  Social History     Socioeconomic History    Marital status: Single     Spouse name: Not on file    Number of children: Not on file    Years of education: Not on file    Highest education level: Not on file   Occupational History    Not on file   Tobacco Use    Smoking status: Former     Packs/day: 0     Types: Cigarettes    Smokeless tobacco: Never    Tobacco comments:     Quit 30 years ago.   Substance and Sexual Activity    Alcohol use: Yes     Alcohol/week: 1.0 - 3.0 standard drink of alcohol     Comment: Alcoholic Drinks/day: 3 times a week.    Drug use: No    Sexual activity: Not Currently     Partners: Female   Other Topics Concern    Parent/sibling w/ CABG, MI or angioplasty before 65F 55M? Not Asked   Social History Narrative    Not on file     Social Determinants of  "Health     Financial Resource Strain: Not on file   Food Insecurity: Not on file   Transportation Needs: Not on file   Physical Activity: Not on file   Stress: Not on file   Social Connections: Not on file   Interpersonal Safety: Not on file   Housing Stability: Not on file       FAMILY HISTORY:  Family History   Problem Relation Age of Onset    Cancer Mother     CABG Father     Cerebrovascular Disease Father          ALLERGIES:  No Known Allergies    MEDICATIONS:  (Not in a hospital admission)        ROS:  12 point review of systems reviewed and is negative except as stated above      PHYSICAL EXAM:  BP (!) 151/70   Pulse 79   Temp (!) 101.8  F (38.8  C) (Axillary)   Resp 10   Ht 1.727 m (5' 8\")   Wt 88.5 kg (195 lb)   SpO2 95%   BMI 29.65 kg/m      General: Alert, verbal, no neck stiffness no skin rash  HEENT: Pupils equal and reactive,ENT WNL   Neck- supple, No JVP elevation, lymphadenopathy or thyromegaly. Trachea-central.  Chest: Clear to auscultation bilaterally.  Heart: S1S2 regular. No M/R/G.  Abdomen: Soft. NT, ND. No organomegaly. Bowel sounds- active.  Jaimes catheter noted  Back: No spine tenderness. No CVA tenderness.  Extremities: No leg swelling. Peripheral pulses 2+ bilaterally.  Neuro: No focal neurological deficit  Skin: no skin rashes     DIAGNOSTIC DATA: Case discussed extensively ED physician      EKG Results: Personally reviewed no acute ischemia        Advanced Care Planning       Hammad Campuzano MD     "

## 2023-10-21 NOTE — PHARMACY-VANCOMYCIN DOSING SERVICE
Pharmacy Vancomycin Initial Note  Date of Service 2023  Patient's  1950  72 year old, male    Indication: Sepsis, Urinary Tract Infection, and possible meningitis    Current estimated CrCl = Estimated Creatinine Clearance: 61 mL/min (A) (based on SCr of 1.2 mg/dL (H)).    Creatinine for last 3 days  10/19/2023:  7:03 PM Creatinine 1.28 mg/dL  10/21/2023:  1:08 PM Creatinine 1.20 mg/dL    Recent Vancomycin Level(s) for last 3 days  No results found for requested labs within last 3 days.      Vancomycin IV Administrations (past 72 hours)        No vancomycin orders with administrations in past 72 hours.                    Nephrotoxins and other renal medications (From now, onward)      Start     Dose/Rate Route Frequency Ordered Stop    10/21/23 1900  ampicillin (OMNIPEN) 2 g vial to attach to  mL bag        Note to Pharmacy: For possible meningitis    2 g  over 15 Minutes Intravenous EVERY 4 HOURS 10/21/23 1852              Contrast Orders - past 72 hours (72h ago, onward)      Start     Dose/Rate Route Frequency Stop    10/21/23 1430  iopamidol (ISOVUE-370) solution 90 mL         90 mL Intravenous ONCE 10/21/23 1609              Plan:  Start vancomycin 1750 mg (19.8 mg/kg) x1 followed by 1500 mg (17 mg/kg) IV q24h.   Vancomycin monitoring method: Trough (Method 2 = manual dose calculation)  Vancomycin therapeutic monitoring goal: 15-20 mg/L for possible meningitis  Pharmacy will check vancomycin levels as appropriate in 1-3 Days.    Serum creatinine levels will be ordered daily for the first week of therapy and at least twice weekly for subsequent weeks.      Sheryl Georges, [facility]

## 2023-10-21 NOTE — ED PROVIDER NOTES
Emergency Department Encounter     Evaluation Date & Time:   10/21/2023 12:24 PM    CHIEF COMPLAINT:  Generalized Weakness      Triage Note:     FINAL IMPRESSION:  No diagnosis found.    Impression and Plan     ED COURSE & MEDICAL DECISION MAKING:        ED Course as of 10/22/23 0943   Sat Oct 21, 2023   1328 Patient was seen for fever and altered mental status.  That he lives at home with his son but his brother is here with him today.  He notes that he started complaining of not feeling well couple of days ago.  His symptoms were vague but he did complain of a sore throat at some point.  Apparently he was here on my review of the charts with a vasovagal episode on October 19 and had a borderline blood pressure at the time.  He had a general set of blood work done that showed some renal insufficiency but was otherwise unremarkable.  He has a chronic indwelling Jaimes catheter so without fever or other symptoms at the time no urinalysis was done.  Today the urinalysis is already back taken off of the same catheter but after it had been disconnected and obviously does show still some white blood cells.  Certainly this may be a source of infection but obviously will have to do a more extensive source of other causes.  He is not tachycardic but with the altered mental status and febrile illness does meet criteria for sepsis.  Sepsis protocol was used and will plan to admit the patient for obvious encephalopathy related to fever.   1330 I have reviewed his outpatient chart with us, recent labs, and [facility] chart.   1425 His lactic acid is not severely elevated.  Still does meet criteria for sepsis with fever and altered mental status.  If no source of infection was found would have to consider possible thyrotoxicosis so will order TSH.  Patient signed out to Dr. Lee       At the conclusion of the encounter I discussed the results of all the tests and the disposition. The questions were answered. The patient or family  "acknowledged understanding and was agreeable with the care plan.          0 minutes of critical care time        MEDICATIONS GIVEN IN THE EMERGENCY DEPARTMENT:  Medications   sodium chloride (PF) 0.9% PF flush 3 mL (has no administration in time range)   sodium chloride (PF) 0.9% PF flush 3 mL (has no administration in time range)   lidocaine (XYLOCAINE) 2 % external gel 20 mL (has no administration in time range)   sodium chloride 0.9 % infusion (has no administration in time range)   cefTRIAXone (ROCEPHIN) 2 g vial to attach to  ml bag for ADULTS or NS 50 ml bag for PEDS (has no administration in time range)   lidocaine 1 % 0.1-5 mL (has no administration in time range)   lidocaine (LMX4) cream (has no administration in time range)   sodium chloride (PF) 0.9% PF flush 10-40 mL (has no administration in time range)       NEW PRESCRIPTIONS STARTED AT TODAY'S ED VISIT:  New Prescriptions    No medications on file       HPI-limited due to mental status and confusion     Venkatesh Alvarado is a 72 year old male with a pertinent history of HTN who presents to this ED by ambulance for evaluation of generalized weakness.    Per brother, the patient lives alone and his son ask him to come and check on patient . He went to check on patient due to his catheter problem and got there around 10 AM today. When he got there the patient was sleeping. When the patient woke up, he was weak and couldn't get up. The patient has prostate cancer and has been going to the [facility] for treatment. The patient's brother states that he said \"he was not feeling well and  feels like he is having a flu\". Patient endorses sore throat yesterday. No vomiting. No other complaints right now.     REVIEW OF SYSTEMS:  Review of Systems   Neurological:  Positive for weakness.   All other systems reviewed and are negative.    remainder of systems are all otherwise negative.        Medical History     Past Medical History:   Diagnosis Date    " "Cancer (H)     Osteoarthritis        Past Surgical History:   Procedure Laterality Date    INGUINAL HERNIA REPAIR Right     IR CVC TUNNEL PLACEMENT > 5 YRS OF AGE  10/29/2021    IR CVC TUNNEL REMOVAL RIGHT  11/22/2021    PICC AND MIDLINE TEAM LINE INSERTION  9/5/2019            Family History   Problem Relation Age of Onset    Cancer Mother     CABG Father     Cerebrovascular Disease Father        Social History     Tobacco Use    Smoking status: Former     Packs/day: 0     Types: Cigarettes    Smokeless tobacco: Never    Tobacco comments:     Quit 30 years ago.   Substance Use Topics    Alcohol use: Yes     Alcohol/week: 1.0 - 3.0 standard drink of alcohol     Comment: Alcoholic Drinks/day: 3 times a week.    Drug use: No       acetaminophen (TYLENOL) 325 MG tablet  amLODIPine (NORVASC) 2.5 MG tablet  amLODIPine (NORVASC) 2.5 MG tablet  denosumab (XGEVA) 120 mg/1.7 mL (70 mg/mL) Soln  docusate sodium (COLACE) 100 MG capsule  gabapentin (NEURONTIN) 100 MG capsule  HYDROcodone-acetaminophen (NORCO) 5-325 MG tablet  leuprolide (ELIGARD) 45 MG kit  pantoprazole (PROTONIX) 40 MG EC tablet  polyethylene glycol (MIRALAX) 17 g packet        Physical Exam     First Vitals:  Patient Vitals for the past 24 hrs:   BP Temp Temp src Pulse Resp SpO2 Height Weight   10/21/23 1300 125/62 (!) 103.4  F (39.7  C) Axillary 84 25 94 % 1.727 m (5' 8\") 88.5 kg (195 lb)   10/21/23 1245 129/60 -- -- 84 -- 96 % -- --   10/21/23 1230 129/65 (!) 102  F (38.9  C) Axillary -- -- -- -- --       PHYSICAL EXAM:   Constitutional:   Sitting up in bed with a resting tremmer in his head and neck  HENT:  Normocephalic, posterior pharynx wnl, mucous membranes dry and dark pink.    Eyes:  PERRL, EOMI, Conjunctiva normal, No discharge, no scleral icterus.  Respiratory:  Tachyphemic, Decreased breath sound on right side other wise clear auscultation,  Cardiovascular:  rrr nl s1s2 0 murmurs, rubs, or gallops.  Peripheral pulses dp, pt, and radial are wnl.  " No peripheral edema, Regular rate and rhythm   GI:  Bowel sounds normal, Soft abdomen, but voluntarily guard diffusely with a slight facial grimace,No tenderness, No flank tenderness, nondistended.  :Fully catheter that's draining cloudy urine.  Musculoskeletal:  Moves all extremities.  No erythematous or swollen major joints,   Integument:  Normal skin color.   Lymphatic:  No cervical lymphadenopathy  Neurologic:   Alert but not oriented, Normal motor function, Normal sensory function, No focal deficits noted. Moves all extremities,  Only response is yes   Psychiatric:  Affect normal, Judgment normal, Mood normal.      Results     LAB AND RADIOLOGY:  All pertinent labs reviewed and interpreted  Results for orders placed or performed during the hospital encounter of 10/21/23   [facility] Draw     Status: None (In process)    Narrative    The following orders were created for panel order [facility] Draw.  Procedure                               Abnormality         Status                     ---------                               -----------         ------                     Extra Blue Top Tube[[id number]]                              In process                 Extra Red Top Tube[[id number]]                               In process                 Extra Green Top (Lithium...[[id number]]                      In process                 Extra Purple Top Tube[[id number]]                            In process                   Please view results for these tests on the individual orders.   UA with Microscopic reflex to Culture     Status: Abnormal    Specimen: Urine, Midstream   Result Value Ref Range    Color Urine Yellow Colorless, Straw, Light Yellow, Yellow    Appearance Urine Turbid (A) Clear    Glucose Urine Negative Negative mg/dL    Bilirubin Urine Negative Negative    Ketones Urine 40 (A) Negative mg/dL    Specific Gravity Urine 1.025 1.001 - 1.030    Blood Urine 1.0 mg/dL (A) Negative    pH Urine 5.5 5.0 - 7.0     Protein Albumin Urine 200 (A) Negative mg/dL    Urobilinogen Urine <2.0 <2.0 mg/dL    Nitrite Urine Negative Negative    Leukocyte Esterase Urine 250 Louie/uL (A) Negative    Bacteria Urine Few (A) None Seen /HPF    Mucus Urine Present (A) None Seen /LPF    RBC Urine 5 (H) <=2 /HPF    WBC Urine 22 (H) <=5 /HPF    Squamous Epithelials Urine <1 <=1 /HPF    Narrative    Urine Culture ordered based on laboratory criteria   Comprehensive metabolic panel     Status: Abnormal   Result Value Ref Range    Sodium 133 (L) 135 - 145 mmol/L    Potassium 3.8 3.4 - 5.3 mmol/L    Carbon Dioxide (CO2) 22 22 - 29 mmol/L    Anion Gap 11 7 - 15 mmol/L    Urea Nitrogen 24.0 (H) 8.0 - 23.0 mg/dL    Creatinine 1.20 (H) 0.67 - 1.17 mg/dL    GFR Estimate 65 >60 mL/min/1.73m2    Calcium 8.2 (L) 8.8 - 10.2 mg/dL    Chloride 100 98 - 107 mmol/L    Glucose 122 (H) 70 - 99 mg/dL    Alkaline Phosphatase 74 40 - 129 U/L    AST 29 0 - 45 U/L    ALT 17 0 - 70 U/L    Protein Total 6.8 6.4 - 8.3 g/dL    Albumin 3.9 3.5 - 5.2 g/dL    Bilirubin Total 0.5 <=1.2 mg/dL   INR     Status: Abnormal   Result Value Ref Range    INR 1.57 (H) 0.85 - 1.15   Partial thromboplastin time     Status: Normal   Result Value Ref Range    aPTT 35 22 - 38 Seconds   Troponin T, High Sensitivity     Status: Normal   Result Value Ref Range    Troponin T, High Sensitivity 14 <=22 ng/L   CBC with platelets and differential     Status: Abnormal   Result Value Ref Range    WBC Count 3.2 (L) 4.0 - 11.0 10e3/uL    RBC Count 3.82 (L) 4.40 - 5.90 10e6/uL    Hemoglobin 12.8 (L) 13.3 - 17.7 g/dL    Hematocrit 36.1 (L) 40.0 - 53.0 %    MCV 95 78 - 100 fL    MCH 33.5 (H) 26.5 - 33.0 pg    MCHC 35.5 31.5 - 36.5 g/dL    RDW 12.7 10.0 - 15.0 %    Platelet Count 71 (L) 150 - 450 10e3/uL    % Neutrophils 87 %    % Lymphocytes 4 %    % Monocytes 8 %    Mids % (Monos, Eos, Basos)      % Eosinophils 0 %    % Basophils 0 %    % Immature Granulocytes 1 %    NRBCs per 100 WBC 0 <1 /100    Absolute  Neutrophils 2.8 1.6 - 8.3 10e3/uL    Absolute Lymphocytes 0.1 (L) 0.8 - 5.3 10e3/uL    Absolute Monocytes 0.2 0.0 - 1.3 10e3/uL    Mids Abs (Monos, Eos, Basos)      Absolute Eosinophils 0.0 0.0 - 0.7 10e3/uL    Absolute Basophils 0.0 0.0 - 0.2 10e3/uL    Absolute Immature Granulocytes 0.0 <=0.4 10e3/uL    Absolute NRBCs 0.0 10e3/uL   CBC with platelets differential     Status: Abnormal    Narrative    The following orders were created for panel order CBC with platelets differential.  Procedure                               Abnormality         Status                     ---------                               -----------         ------                     CBC with platelets and d...[[id number]]  Abnormal            Final result                 Please view results for these tests on the individual orders.         ECG:    Performed at: 1:28 PM    Impression: Sinus rhythm    Rate: 85 BPM  Rhythm: Sinus rhythm  Axis: -33 ms  [place] Interval: 154 ms  QRS Interval: 82 ms  QTc Interval: 406 ms  ST Changes: None  Comparison: None    I have independently reviewed and interpreted the EKS(s) documented above    PROCEDURES:  Procedures:      [facility] Documentation     Medical Decision Making    History:  Supplemental history from: Documented in chart, if applicable  External Record(s) reviewed: Documented in chart, if applicable.    Work Up:  Chart documentation includes differential considered and any EKGs or imaging independently interpreted by provider, where specified.  In additional to work up documented, I considered the following work up: Documented in chart, if applicable.    External consultation:  Discussion of management with another provider: Documented in chart, if applicable    Complicating factors:  Care impacted by chronic illness: Hypertension  Care affected by social determinants of health: N/A    Disposition considerations: Admit.will be needed.  Signed out to dr engle with ct pending.        CMS  Diagnoses: sepsis without findings of severe sepsis on initial labs       The creation of this record is based on the scribe s observations of the work being performed by Let Let and the provider s statements to them. This document has been checked and approved by MD Iva Abraham MD  Emergency Medicine  [facility] EMERGENCY DEPARTMENT     Iva Sood MD  10/22/23 0945

## 2023-10-22 LAB
ANION GAP SERPL CALCULATED.3IONS-SCNC: 10 MMOL/L (ref 7–15)
BUN SERPL-MCNC: 23.6 MG/DL (ref 8–23)
C GATTII+NEOFOR DNA CSF QL NAA+NON-PROBE: NEGATIVE
CALCIUM SERPL-MCNC: 6.9 MG/DL (ref 8.8–10.2)
CALCIUM, IONIZED MEASURED: 0.89 MMOL/L (ref 1.11–1.3)
CHLORIDE SERPL-SCNC: 107 MMOL/L (ref 98–107)
CMV DNA CSF QL NAA+NON-PROBE: NEGATIVE
CREAT SERPL-MCNC: 0.98 MG/DL (ref 0.67–1.17)
DEPRECATED HCO3 PLAS-SCNC: 21 MMOL/L (ref 22–29)
E COLI K1 AG CSF QL: NEGATIVE
EGFRCR SERPLBLD CKD-EPI 2021: 82 ML/MIN/1.73M2
ERYTHROCYTE [DISTWIDTH] IN BLOOD BY AUTOMATED COUNT: 12.6 % (ref 10–15)
EV RNA SPEC QL NAA+PROBE: NEGATIVE
GLUCOSE SERPL-MCNC: 118 MG/DL (ref 70–99)
GP B STREP DNA CSF QL NAA+NON-PROBE: NEGATIVE
HAEM INFLU DNA CSF QL NAA+NON-PROBE: NEGATIVE
HCT VFR BLD AUTO: 30.4 % (ref 40–53)
HGB BLD-MCNC: 10.7 G/DL (ref 13.3–17.7)
HHV6 DNA CSF QL NAA+NON-PROBE: NEGATIVE
HSV1 DNA CSF QL NAA+NON-PROBE: NEGATIVE
HSV1 DNA CSF QL NAA+PROBE: NOT DETECTED
HSV2 DNA CSF QL NAA+NON-PROBE: NEGATIVE
HSV2 DNA CSF QL NAA+PROBE: NOT DETECTED
ION CA PH 7.4: 0.89 MMOL/L (ref 1.11–1.3)
L MONOCYTOG DNA CSF QL NAA+NON-PROBE: NEGATIVE
LACTATE SERPL-SCNC: 0.9 MMOL/L (ref 0.7–2)
MCH RBC QN AUTO: 33 PG (ref 26.5–33)
MCHC RBC AUTO-ENTMCNC: 35.2 G/DL (ref 31.5–36.5)
MCV RBC AUTO: 94 FL (ref 78–100)
MRSA DNA SPEC QL NAA+PROBE: NEGATIVE
N MEN DNA CSF QL NAA+NON-PROBE: NEGATIVE
PARECHOVIRUS A RNA CSF QL NAA+NON-PROBE: NEGATIVE
PH: 7.41 (ref 7.35–7.45)
PLATELET # BLD AUTO: 51 10E3/UL (ref 150–450)
POTASSIUM SERPL-SCNC: 3.8 MMOL/L (ref 3.4–5.3)
RBC # BLD AUTO: 3.24 10E6/UL (ref 4.4–5.9)
S PNEUM DNA CSF QL NAA+NON-PROBE: NEGATIVE
SA TARGET DNA: NEGATIVE
SODIUM SERPL-SCNC: 138 MMOL/L (ref 135–145)
VZV DNA CSF QL NAA+NON-PROBE: NEGATIVE
WBC # BLD AUTO: 2.7 10E3/UL (ref 4–11)

## 2023-10-22 PROCEDURE — 82374 ASSAY BLOOD CARBON DIOXIDE: CPT | Performed by: INTERNAL MEDICINE

## 2023-10-22 PROCEDURE — 250N000013 HC RX MED GY IP 250 OP 250 PS 637: Performed by: INTERNAL MEDICINE

## 2023-10-22 PROCEDURE — 99233 SBSQ HOSP IP/OBS HIGH 50: CPT | Performed by: HOSPITALIST

## 2023-10-22 PROCEDURE — 258N000003 HC RX IP 258 OP 636: Performed by: INTERNAL MEDICINE

## 2023-10-22 PROCEDURE — 83605 ASSAY OF LACTIC ACID: CPT | Performed by: HOSPITALIST

## 2023-10-22 PROCEDURE — 85027 COMPLETE CBC AUTOMATED: CPT | Performed by: INTERNAL MEDICINE

## 2023-10-22 PROCEDURE — 99222 1ST HOSP IP/OBS MODERATE 55: CPT | Performed by: INTERNAL MEDICINE

## 2023-10-22 PROCEDURE — 250N000011 HC RX IP 250 OP 636: Mod: JZ | Performed by: HOSPITALIST

## 2023-10-22 PROCEDURE — 120N000001 HC R&B MED SURG/OB

## 2023-10-22 PROCEDURE — 87641 MR-STAPH DNA AMP PROBE: CPT | Performed by: HOSPITALIST

## 2023-10-22 PROCEDURE — 250N000011 HC RX IP 250 OP 636: Performed by: INTERNAL MEDICINE

## 2023-10-22 PROCEDURE — 82330 ASSAY OF CALCIUM: CPT | Performed by: HOSPITALIST

## 2023-10-22 RX ORDER — SENNOSIDES 8.6 MG
2 TABLET ORAL DAILY
Status: DISCONTINUED | OUTPATIENT
Start: 2023-10-22 | End: 2023-10-24 | Stop reason: HOSPADM

## 2023-10-22 RX ORDER — GABAPENTIN 100 MG/1
1-2 CAPSULE ORAL
COMMUNITY
End: 2024-05-28

## 2023-10-22 RX ORDER — GABAPENTIN 100 MG/1
100-200 CAPSULE ORAL
Status: DISCONTINUED | OUTPATIENT
Start: 2023-10-22 | End: 2023-10-22

## 2023-10-22 RX ORDER — POLYETHYLENE GLYCOL 3350 17 G/17G
17 POWDER, FOR SOLUTION ORAL DAILY
Status: DISCONTINUED | OUTPATIENT
Start: 2023-10-22 | End: 2023-10-24 | Stop reason: HOSPADM

## 2023-10-22 RX ORDER — CALCIUM GLUCONATE 20 MG/ML
2 INJECTION, SOLUTION INTRAVENOUS ONCE
Status: COMPLETED | OUTPATIENT
Start: 2023-10-22 | End: 2023-10-22

## 2023-10-22 RX ORDER — GABAPENTIN 100 MG/1
200 CAPSULE ORAL
Status: DISCONTINUED | OUTPATIENT
Start: 2023-10-22 | End: 2023-10-24 | Stop reason: HOSPADM

## 2023-10-22 RX ADMIN — VANCOMYCIN HYDROCHLORIDE 1500 MG: 5 INJECTION, POWDER, LYOPHILIZED, FOR SOLUTION INTRAVENOUS at 19:58

## 2023-10-22 RX ADMIN — CALCIUM GLUCONATE 2 G: 20 INJECTION, SOLUTION INTRAVENOUS at 18:11

## 2023-10-22 RX ADMIN — AMPICILLIN SODIUM 2 G: 2 INJECTION, POWDER, FOR SOLUTION INTRAMUSCULAR; INTRAVENOUS at 08:09

## 2023-10-22 RX ADMIN — SODIUM CHLORIDE: 9 INJECTION, SOLUTION INTRAVENOUS at 02:17

## 2023-10-22 RX ADMIN — AMPICILLIN SODIUM 2 G: 2 INJECTION, POWDER, FOR SOLUTION INTRAMUSCULAR; INTRAVENOUS at 04:28

## 2023-10-22 RX ADMIN — CEFTRIAXONE SODIUM 2 G: 2 INJECTION, POWDER, FOR SOLUTION INTRAMUSCULAR; INTRAVENOUS at 04:28

## 2023-10-22 RX ADMIN — CEFTRIAXONE SODIUM 2 G: 2 INJECTION, POWDER, FOR SOLUTION INTRAMUSCULAR; INTRAVENOUS at 16:43

## 2023-10-22 RX ADMIN — ACETAMINOPHEN 650 MG: 325 TABLET ORAL at 06:24

## 2023-10-22 RX ADMIN — ACETAMINOPHEN 650 MG: 325 TABLET ORAL at 16:43

## 2023-10-22 ASSESSMENT — ACTIVITIES OF DAILY LIVING (ADL)
ADLS_ACUITY_SCORE: 31
ADLS_ACUITY_SCORE: 30
ADLS_ACUITY_SCORE: 26
ADLS_ACUITY_SCORE: 26
ADLS_ACUITY_SCORE: 32
ADLS_ACUITY_SCORE: 31
ADLS_ACUITY_SCORE: 32
ADLS_ACUITY_SCORE: 26
ADLS_ACUITY_SCORE: 30
ADLS_ACUITY_SCORE: 31

## 2023-10-22 NOTE — PLAN OF CARE
Problem: UTI (Urinary Tract Infection)  Goal: Improved Infection Symptoms  Outcome: Progressing     Problem: Fever  Goal: Body Temperature in Desired Range  Outcome: Progressing     Problem: Risk for Delirium  Goal: Improved Attention and Thought Clarity  Outcome: Progressing     Patient slept intermittently overnight. Denies pain and nausea. Reports feeling of severe malaise. Fever increased to 102.7 F this AM. Acetaminophen given. Patient intermittently confused. Appears more confused this AM. Is disoriented to time and place. IV abx running per orders.    Paul Pinto RN

## 2023-10-22 NOTE — PROCEDURES
Neurointerventional Surgery  Post-procedure     Patient Name: Venkatesh Alvarado  MRN: [id number]  Date of Procedure: October 21, 2023    Procedure: Lumbar Puncture  Radiologist: Neftaly Martino MD    Fluoro Time: 0.1 minutes  Air Kerma: 1.9 mGy    EBL: 0 ml   Complications: None    Patient reevaluated immediately prior to sedation and prior to procedure.    Preliminary Report:   (See dictation for full detail)  16 ml Clear CSF to lab    Assess/Plan:  Report to ordering    Neftaly Martino MD MD  Emergency pager: [phone]  Office: [phone]

## 2023-10-22 NOTE — PROGRESS NOTES
Care Management Follow Up    Length of Stay (days): 1    Expected Discharge Date: 10/23/2023     Concerns to be Addressed:     discharge planning  Patient plan of care discussed at interdisciplinary rounds: Yes    Anticipated Discharge Disposition:  home no needs pending progression   Anticipated Discharge Services:  n/a  Anticipated Discharge DME:  n/a    Patient/family educated on Medicare website which has current facility and service quality ratings:  n/a  Education Provided on the Discharge Plan:  yes  Patient/Family in Agreement with the Plan:  yes    Referrals Placed by CM/SW:  n/a  Private pay costs discussed: Not applicable    Additional Information:  SW met with pt to introduce role of CM, complete  initial assessment, and to discuss needs at time of d/c. Pt comes from home; lives alone (son with stay with pt often to assist with needs), and noted to be independent at baseline. Pt haschronic Jaimes catheter and was replaced 10/19 in ED. Pt feels that there will be no needs from CM at discharge, and will follow with PCP if needs arise post discharge.    CM to continue to follow through hospitalization.  2:55 PM    JOSUE Christy  10/22/2023

## 2023-10-22 NOTE — PROGRESS NOTES
[facility]    Medicine Progress Note - Hospitalist Service    Date of Admission:  10/21/2023    Assessment & Plan   72-year-old male with history of prostate cancer with mets to the bone on chemotherapy, chronic Jaimes catheter, HTN, back pain with sciatica brought in by family on 10/21 with fever, chills, and confusion at home.  In the ER noted to have fever of 103.4.  CT head was negative.  CT C/A/P negative for any acute infectious etiology; noted cholelithiasis, scattered sclerotic osseous mets, moderate stool noted.  COVID-19, flu, and RSV testing negative.  Due to concern for meningitis LP was performed.  Patient was started on broad-spectrum antibiotic, ampicillin, ceftriaxone, and vancomycin. ID was consulted.  His mental status and overall presentation was much improved day after admission and antibiotics were de-escalated.    Labs were notable for acute on chronic pancytopenia in the setting of sepsis, and mild ANGEL which is prerenal.    -Clinically improving, mental status is better.  Continue ceftriaxone vancomycin, await ID input.  Stop ampicillin.    Acute Encephalopathy (Resolved)  Rule Out Meningitis  Sepsis, Recurrent Fever  Likely Urinary Source of Infection  WBC < 4, T > 100.4.  Suspect source of infection is likely urinary as patient stepped on his Jaimes catheter 2 days prior to admission and it fell out, requiring replacement.  -Still febrile this AM but clinically improved, Aox3  -ID consulted, appreciate recs  -Blood Cx (10/21) pending  -Meningitis panel pending but prelim studies only show 3 nucleated cells in CSF and given rapid improvement, do not feel meninginitis present  -Stop Ampicillin  -Ceftx, Vanco (start date 10/21)   -MRSA PCR ordered    Pancytopenia  Mildly worse, likely due to sepsis.  -continue to monitor at this time    Possible Mild ANGEL  Creatinine 1.28 on admission. Baseline Cr ~ 0.8. Likely pre-renal due to sepsis.  -IVF and continue to  "monitor  -Management includes avoiding nephrotoxic medications and renally dosing of any potentially toxic medications    Constipation  Seen on CT.  -Senna and MiraLAX    Prostate cancer with metastasis  Follows at [facility]. Receives lutetium-177 treatments.  -Is not on chemo currently; receives treatments every 6 weeks    Chronic Jaimes Catheter   Recently exchanged a few days ago in ED    Benign essential hypertension  -Hold blood pressure meds given sepsis        Diet: Regular Diet Adult    DVT Prophylaxis: Pneumatic Compression Devices (given low platelet counts, if improving plan for chemical prophylaxis)  Jaimes Catheter: PRESENT, indication: Retention  Lines: PRESENT      PICC 10/21/23 Triple Lumen Right Basilic Vascular Access-Site Assessment: WDL      Cardiac Monitoring: ACTIVE order. Indication: Tachyarrhythmias, acute (48 hours)  Code Status: Full Code      Clinically Significant Risk Factors Present on Admission          # Hypocalcemia: Lowest Ca = 6.9 mg/dL in last 2 days, will monitor and replace as appropriate        # Coagulation Defect: INR = 1.57 (Ref range: 0.85 - 1.15) and/or PTT = 35 Seconds (Ref range: 22 - 38 Seconds), will monitor for bleeding  # Thrombocytopenia: Lowest platelets = 51 in last 2 days, will monitor for bleeding   # Hypertension: Noted on problem list      # Obesity: Estimated body mass index is 31.17 kg/m  as calculated from the following:    Height as of this encounter: 1.727 m (5' 8\").    Weight as of this encounter: 93 kg (205 lb 0.4 oz).              Disposition Plan      Expected Discharge Date: 10/23/2023                    Miguel Valadez MD  Hospitalist Service  [facility]  Securely message with [email or web address] (more info)  Text page via [employer] Paging/Directory   ______________________________________________________________________    Interval History   No acute events overnight.     Patient seen and evaluated at bedside.  Patient feels better this " morning.  Does report having a nonproductive cough which is new.  Denies any chest pain, shortness of breath.  Reports that 2 days prior to admission he stepped on his Jaimes catheter which dislodged it  And had to get it replaced in the ER.  Denies any other significant symptoms.    Patient's brother was present bedside and given update.    Physical Exam   Vital Signs: Temp: 99.2  F (37.3  C) Temp src: Oral BP: 107/61 Pulse: 59   Resp: 20 SpO2: 96 % O2 Device: None (Room air)    Weight: 205 lbs .44 oz    General: No acute distress  CV: +S1/S2, no pitting edema  Respiratory: clear to auscultation bilaterally  GI: soft, nontender, nondistended  : Jaimes catheter in place  Neuro: alert    Medical Decision Making               Data     I have personally reviewed the following data over the past 24 hrs:    2.7 (L)  \   10.7 (L)   / 51 (L)     138 107 23.6 (H) /  118 (H)   3.8 21 (L) 0.98 \     ALT: 17 AST: 29 AP: 74 TBILI: 0.5   ALB: 3.9 TOT PROTEIN: 6.8 LIPASE: N/A     Trop: 14 BNP: N/A     TSH: 1.71 T4: N/A A1C: N/A     Procal: N/A CRP: N/A Lactic Acid: 0.8       INR:  1.57 (H) PTT:  35   D-dimer:  N/A Fibrinogen:  N/A       Imaging results reviewed over the past 24 hrs:   Recent Results (from the past 24 hour(s))   Head CT w/o contrast    Narrative    EXAM: CT HEAD W/O CONTRAST  LOCATION: [facility]  DATE: 10/21/2023    INDICATION: confusion, fever.  prostate cancer  COMPARISON: 04/16/2021.  TECHNIQUE: Routine CT Head without IV contrast. Multiplanar reformats. Dose reduction techniques were used.    FINDINGS:  INTRACRANIAL CONTENTS: No midline shift. No acute intracranial hemorrhage. No hydrocephalus or extra-axial hemorrhage. 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 Intracranial   atheromatous changes are present. No acute loss of gray-white differentiation.    VISUALIZED ORBITS/SINUSES/MASTOIDS: No  intraorbital abnormality. No paranasal sinus mucosal disease. No middle ear or mastoid effusion.    BONES/SOFT TISSUES: No acute abnormality.      Impression    IMPRESSION:  1.  No acute intracranial abnormality. Chronic changes as above. 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    CT Chest/Abdomen/Pelvis w Contrast    Narrative    EXAM: CT CHEST/ABDOMEN/PELVIS W CONTRAST  LOCATION: [facility]  DATE: 10/21/2023    INDICATION: Fever. Sepsis. Confusion.  COMPARISON: CT abdomen pelvis 01/12/2021. CT chest, abdomen, and pelvis 09/03/2019.  TECHNIQUE: CT scan of the chest, abdomen, and pelvis was performed following injection of IV contrast. Multiplanar reformats were obtained. Dose reduction techniques were used.   CONTRAST: isovue 370 90ml    FINDINGS:     LUNGS AND PLEURA: Scarring and volume loss within left upper lobe adjacent to an osseous metastasis in the left second rib. Mild bibasilar atelectasis. Few punctate calcified granulomas. No consolidations, pleural effusions, or pneumothorax. Central   airways are patent.    MEDIASTINUM/AXILLAE: Right PICC tip is near the superior cavoatrial junction. Normal cardiac size. No pericardial effusion. 1 cm hypodense right thyroid nodule, which does not require follow-up. No enlarged thoracic lymph node. Central pulmonary arteries   are patent. Mild scattered atherosclerotic calcifications within the descending thoracic aorta.    CORONARY ARTERY CALCIFICATION: Mild.    HEPATOBILIARY: Cholelithiasis. No biliary ductal dilation.    PANCREAS: Normal.    SPLEEN: Normal.    ADRENAL GLANDS: Normal.    KIDNEYS/BLADDER: Normal kidneys, without hydronephrosis. Urinary bladder is partially decompressed by a Jaimes catheter.    BOWEL: No obstruction or inflammation. Moderate amount of stool throughout the descending and tortuous sigmoid colon.    LYMPH NODES: No enlarged  lymph nodes.    VASCULATURE: Mild aortobiiliac atherosclerosis. No abdominal aortic aneurysm. Patent portal and superior mesenteric veins.    PELVIC ORGANS: Prostate is present; no discrete prostate mass is identified.    MUSCULOSKELETAL: Small fat-containing right inguinal hernia. Multiple scattered sclerotic metastases within the spine, pelvis, and ribs, including metastases within left second rib; the 11th, 4th, and 2nd thoracic vertebral bodies; and the left iliac   wing. No pathologic fractures. Multilevel degenerative changes of the spine.      Impression    IMPRESSION:    1.  No acute abnormality or source of infection identified within the chest, abdomen, or pelvis.    2.  Scattered sclerotic osseous metastases as described. No pathologic fractures.    3.  Cholelithiasis.    4.  Moderate amount of stool throughout the descending and sigmoid colon, suggesting constipation.

## 2023-10-22 NOTE — PLAN OF CARE
"/61 (BP Location: Left arm)   Pulse 59   Temp 98.2  F (36.8  C) (Oral)   Resp 20   Ht 1.727 m (5' 8\")   Wt 93 kg (205 lb 0.4 oz)   SpO2 96%   BMI 31.17 kg/m      Patient VSS throughout shift. No temperature since night shift. Patient is alert, but not oriented to the situation. He goes in and out of confusion.     Patient reports no pain, and is tolerating diet. Patient has been incontinent of stool, tried going to the toilet but didn't pull his gown away from his back so had one incontinent episode.     Patient triggered sepsis at 1445. Lactic drawn and sent to lab.     "

## 2023-10-22 NOTE — PLAN OF CARE
Problem: Adult Inpatient Plan of Care  Goal: Absence of Hospital-Acquired Illness or Injury  Outcome: Not Progressing  Intervention: Identify and Manage Fall Risk  Recent Flowsheet Documentation  Taken 10/21/2023 2000 by Karly Whitney, RN  Safety Promotion/Fall Prevention:   assistive device/personal items within reach   activity supervised   clutter free environment maintained   nonskid shoes/slippers when out of bed   patient and family education   room door open   room near nurse's station   safety round/check completed   treat underlying cause  Intervention: Prevent and Manage VTE (Venous Thromboembolism) Risk  Recent Flowsheet Documentation  Taken 10/21/2023 2000 by Karly Whitney, RN  VTE Prevention/Management: SCDs (sequential compression devices) on   Goal Outcome Evaluation:    -  VSS  - Pt has tremors  - Lumbar puncture done  - New PICC WDL - infusing ABX and NS  - L PIV SL  - Jaimes patent    Karly Whitney, RN

## 2023-10-22 NOTE — CONSULTS
"Infectious Disease Consultation:  Requesting MD:  Reason for consult:      HISTORY:  To ED yesterday at noon with AMS, fevers.  These issues persist now despite LP (clean).  He is on maximal abx.  He doesn't provide good history.  Chronic carlson in at home and on treatment for prostate cancer.  Family endorsed flu like sxs PTA.  Having rigors during my visit.            Pertinent past history, past infectious disease history:    Past Medical History:   Diagnosis Date    Cancer (H)      Osteoarthritis          Past Surgical History         Past Surgical History:   Procedure Laterality Date    INGUINAL HERNIA REPAIR Right      IR CVC TUNNEL PLACEMENT > 5 YRS OF AGE   10/29/2021    IR CVC TUNNEL REMOVAL RIGHT   11/22/2021    PICC AND MIDLINE TEAM LINE INSERTION   9/5/2019                  Family History         Family History   Problem Relation Age of Onset    Cancer Mother      CABG Father      Cerebrovascular Disease Father              Social History            Tobacco Use    Smoking status: Former       Packs/day: 0       Types: Cigarettes    Smokeless tobacco: Never    Tobacco comments:       Quit 30 years ago.   Substance Use Topics    Alcohol use: Yes       Alcohol/week: 1.0 - 3.0 standard drink of alcohol       Comment: Alcoholic Drinks/day: 3 times a week.    Drug use: No      Medications:  Reviewed prior to admission meds as applicable in chart review.  Current meds are reviewed in the EMR listed MAR.     ANTIBIOTICS:    Current:vanco, ctx   Prior:amp   Allergy to:    SH/FH and  travel history(if applicable to consult):above    REVIEW OF SYSTEMS:  All other systems negative    EXAMINATION:  /61 (BP Location: Left arm)   Pulse 59   Temp 98.2  F (36.8  C) (Oral)   Resp 20   Ht 1.727 m (5' 8\")   Wt 93 kg (205 lb 0.4 oz)   SpO2 96%   BMI 31.17 kg/m    Alert, awake  Vitals tabulated above, reviewed  HEENT:chattering teeth  Neck supple without lymphadenopathy  Sclera clear  CARDIOVASCULAR regular rate " "and rhythm, no murmur  Lungs CLEAR TO AUSCULTATION   Abdomen soft, NT/ND, absent HEPATOSPLENOMEGALY  Skin normal  Joints normal  Neurologic exam non focal  Wound:  R shoulder scar healed        CLINICAL DATABASE FOR---LAB/MICRO/CULTURES/IMAGING STUDIES:  Lab Results   Component Value Date    WBC 2.7 10/22/2023     Lab Results   Component Value Date    RBC 3.24 10/22/2023     Lab Results   Component Value Date    HGB 10.7 10/22/2023     Lab Results   Component Value Date    HCT 30.4 10/22/2023     No components found for: \"MCT\"  Lab Results   Component Value Date    MCV 94 10/22/2023     Lab Results   Component Value Date    MCH 33.0 10/22/2023     Lab Results   Component Value Date    MCHC 35.2 10/22/2023     Lab Results   Component Value Date    RDW 12.6 10/22/2023     Lab Results   Component Value Date    PLT 51 10/22/2023       Last Comprehensive Metabolic Panel:  Sodium   Date Value Ref Range Status   10/22/2023 138 135 - 145 mmol/L Final     Comment:     Reference intervals for this test were updated on 09/26/2023 to more accurately reflect our healthy population. There may be differences in the flagging of prior results with similar values performed with this method. Interpretation of those prior results can be made in the context of the updated reference intervals.      Potassium   Date Value Ref Range Status   10/22/2023 3.8 3.4 - 5.3 mmol/L Final   06/04/2022 3.9 3.5 - 5.0 mmol/L Final     Chloride   Date Value Ref Range Status   10/22/2023 107 98 - 107 mmol/L Final   06/04/2022 108 (H) 98 - 107 mmol/L Final     Carbon Dioxide (CO2)   Date Value Ref Range Status   10/22/2023 21 (L) 22 - 29 mmol/L Final   06/04/2022 23 22 - 31 mmol/L Final     Anion Gap   Date Value Ref Range Status   10/22/2023 10 7 - 15 mmol/L Final   06/04/2022 11 5 - 18 mmol/L Final     Glucose   Date Value Ref Range Status   10/22/2023 118 (H) 70 - 99 mg/dL Final   06/04/2022 149 (H) 70 - 125 mg/dL Final     GLUCOSE BY METER POCT   Date " Value Ref Range Status   10/19/2023 110 (H) 70 - 99 mg/dL Final     Urea Nitrogen   Date Value Ref Range Status   10/22/2023 23.6 (H) 8.0 - 23.0 mg/dL Final   06/04/2022 27 8 - 28 mg/dL Final     Creatinine   Date Value Ref Range Status   10/22/2023 0.98 0.67 - 1.17 mg/dL Final     GFR Estimate   Date Value Ref Range Status   10/22/2023 82 >60 mL/min/1.73m2 Final   04/16/2021 >60 >60 mL/min/1.73m2 Final     GFR, ESTIMATED POCT   Date Value Ref Range Status   09/27/2021 >60 >60 mL/min/1.73m2 Final     Calcium   Date Value Ref Range Status   10/22/2023 6.9 (L) 8.8 - 10.2 mg/dL Final       Liver Function Studies -   Recent Labs   Lab Test 10/21/23  1308   PROTTOTAL 6.8   ALBUMIN 3.9   BILITOTAL 0.5   ALKPHOS 74   AST 29   ALT 17       Erythrocyte Sedimentation Rate   Date Value Ref Range Status   09/01/2019 39 (H) 0 - 15 mm/hr Final       CRP   Date Value Ref Range Status   03/23/2022 1.1 (H) 0.0-<0.8 mg/dL Final        Latest Reference Range & Units Most Recent 10/21/23 20:11   RBC CSF 0 - 2 /uL 2  10/21/23 20:11 2   Total Nucleated Cells 0 - 5 /uL 3  10/21/23 20:11 3   Appearance CSF Clear  Clear  10/21/23 20:11 Clear   Color CSF Colorless  Colorless  10/21/23 20:11 Colorless   Tube Number  4  10/21/23 20:11 4   Glucose CSF 40 - 70 mg/dL 72 (H)  10/21/23 20:11 72 (H)   Albumin Fraction 3.2 - 4.7 g/dL 2.5 (L)  9/3/19 05:46    Alpha 1 Fraction 0.1 - 0.3 g/dL 0.4 (H)  9/3/19 05:46    Alpha 2 Fraction 0.4 - 0.9 g/dL 0.7  9/3/19 05:46    Beta Fraction 0.7 - 1.2 g/dL 1.0  9/3/19 05:46    CRP 0.0-<0.8 mg/dL 1.1 (H)  3/23/22 14:47    ELP Interpretation:  The albumin fraction is decreased, and this may represent any combination of protein-calorie malnutrition, bulk protein loss, or accelerated protein breakdown.    The alpha 1 globulin fraction is increased. This is most commonly a nonspecific finding associated with an acute inflammatory response.    9/3/19 05:46    Gamma Fraction 0.6 - 1.4 g/dL 0.8  9/3/19 05:46     Immunofixation ELP  No monoclonal component identified.  9/3/19 05:46    (H): Data is abnormally high  (L): Data is abnormally low        IMPRESSION:  Fever  AMS  Pros ca    PLAN:  Doubt infection here. Worry more about tumor fever.    Will follow           MATY HENSON MD  [facility]  Office [phone]

## 2023-10-23 LAB
ANION GAP SERPL CALCULATED.3IONS-SCNC: 8 MMOL/L (ref 7–15)
BACTERIA UR CULT: ABNORMAL
BACTERIA UR CULT: ABNORMAL
BASO+EOS+MONOS # BLD AUTO: ABNORMAL 10*3/UL
BASO+EOS+MONOS # BLD AUTO: ABNORMAL 10*3/UL
BASO+EOS+MONOS NFR BLD AUTO: ABNORMAL %
BASO+EOS+MONOS NFR BLD AUTO: ABNORMAL %
BASOPHILS # BLD AUTO: 0 10E3/UL (ref 0–0.2)
BASOPHILS # BLD AUTO: 0 10E3/UL (ref 0–0.2)
BASOPHILS NFR BLD AUTO: 0 %
BASOPHILS NFR BLD AUTO: 0 %
BUN SERPL-MCNC: 18.9 MG/DL (ref 8–23)
CALCIUM SERPL-MCNC: 7 MG/DL (ref 8.8–10.2)
CALCIUM, IONIZED MEASURED: 0.98 MMOL/L (ref 1.11–1.3)
CHLORIDE SERPL-SCNC: 107 MMOL/L (ref 98–107)
CREAT SERPL-MCNC: 0.85 MG/DL (ref 0.67–1.17)
DEPRECATED HCO3 PLAS-SCNC: 22 MMOL/L (ref 22–29)
EGFRCR SERPLBLD CKD-EPI 2021: >90 ML/MIN/1.73M2
EOSINOPHIL # BLD AUTO: 0 10E3/UL (ref 0–0.7)
EOSINOPHIL # BLD AUTO: 0 10E3/UL (ref 0–0.7)
EOSINOPHIL NFR BLD AUTO: 1 %
EOSINOPHIL NFR BLD AUTO: 1 %
ERYTHROCYTE [DISTWIDTH] IN BLOOD BY AUTOMATED COUNT: 12.9 % (ref 10–15)
ERYTHROCYTE [DISTWIDTH] IN BLOOD BY AUTOMATED COUNT: 12.9 % (ref 10–15)
FERRITIN SERPL-MCNC: 1956 NG/ML (ref 31–409)
FOLATE SERPL-MCNC: 13.2 NG/ML (ref 4.6–34.8)
GLUCOSE SERPL-MCNC: 143 MG/DL (ref 70–99)
HCT VFR BLD AUTO: 30.5 % (ref 40–53)
HCT VFR BLD AUTO: 30.7 % (ref 40–53)
HGB BLD-MCNC: 10.8 G/DL (ref 13.3–17.7)
HGB BLD-MCNC: 10.9 G/DL (ref 13.3–17.7)
IMM GRANULOCYTES # BLD: 0 10E3/UL
IMM GRANULOCYTES # BLD: 0 10E3/UL
IMM GRANULOCYTES NFR BLD: 0 %
IMM GRANULOCYTES NFR BLD: 0 %
ION CA PH 7.4: 1.02 MMOL/L (ref 1.11–1.3)
IRON BINDING CAPACITY (ROCHE): 142 UG/DL (ref 240–430)
IRON SATN MFR SERPL: 16 % (ref 15–46)
IRON SERPL-MCNC: 23 UG/DL (ref 61–157)
LACTATE SERPL-SCNC: 0.9 MMOL/L (ref 0.7–2)
LYMPHOCYTES # BLD AUTO: 0.1 10E3/UL (ref 0.8–5.3)
LYMPHOCYTES # BLD AUTO: 0.1 10E3/UL (ref 0.8–5.3)
LYMPHOCYTES NFR BLD AUTO: 4 %
LYMPHOCYTES NFR BLD AUTO: 5 %
MAGNESIUM SERPL-MCNC: 2.1 MG/DL (ref 1.7–2.3)
MCH RBC QN AUTO: 33.2 PG (ref 26.5–33)
MCH RBC QN AUTO: 33.6 PG (ref 26.5–33)
MCHC RBC AUTO-ENTMCNC: 35.4 G/DL (ref 31.5–36.5)
MCHC RBC AUTO-ENTMCNC: 35.5 G/DL (ref 31.5–36.5)
MCV RBC AUTO: 94 FL (ref 78–100)
MCV RBC AUTO: 95 FL (ref 78–100)
MONOCYTES # BLD AUTO: 0.2 10E3/UL (ref 0–1.3)
MONOCYTES # BLD AUTO: 0.2 10E3/UL (ref 0–1.3)
MONOCYTES NFR BLD AUTO: 10 %
MONOCYTES NFR BLD AUTO: 10 %
NEUTROPHILS # BLD AUTO: 1.9 10E3/UL (ref 1.6–8.3)
NEUTROPHILS # BLD AUTO: 2.1 10E3/UL (ref 1.6–8.3)
NEUTROPHILS NFR BLD AUTO: 84 %
NEUTROPHILS NFR BLD AUTO: 85 %
NRBC # BLD AUTO: 0 10E3/UL
NRBC # BLD AUTO: 0 10E3/UL
NRBC BLD AUTO-RTO: 0 /100
NRBC BLD AUTO-RTO: 0 /100
PH: 7.48 (ref 7.35–7.45)
PHOSPHATE SERPL-MCNC: 1.2 MG/DL (ref 2.5–4.5)
PLATELET # BLD AUTO: 42 10E3/UL (ref 150–450)
PLATELET # BLD AUTO: 45 10E3/UL (ref 150–450)
POTASSIUM SERPL-SCNC: 3.6 MMOL/L (ref 3.4–5.3)
RBC # BLD AUTO: 3.24 10E6/UL (ref 4.4–5.9)
RBC # BLD AUTO: 3.25 10E6/UL (ref 4.4–5.9)
RETICS # AUTO: 0.03 10E6/UL (ref 0.01–0.11)
RETICS/RBC NFR AUTO: 1.1 % (ref 0.8–2.7)
SODIUM SERPL-SCNC: 137 MMOL/L (ref 135–145)
VIT B12 SERPL-MCNC: 535 PG/ML (ref 232–1245)
WBC # BLD AUTO: 2.3 10E3/UL (ref 4–11)
WBC # BLD AUTO: 2.5 10E3/UL (ref 4–11)

## 2023-10-23 PROCEDURE — 84100 ASSAY OF PHOSPHORUS: CPT | Performed by: HOSPITALIST

## 2023-10-23 PROCEDURE — 99233 SBSQ HOSP IP/OBS HIGH 50: CPT | Performed by: HOSPITALIST

## 2023-10-23 PROCEDURE — 83550 IRON BINDING TEST: CPT | Performed by: HOSPITALIST

## 2023-10-23 PROCEDURE — 250N000009 HC RX 250: Performed by: HOSPITALIST

## 2023-10-23 PROCEDURE — 80048 BASIC METABOLIC PNL TOTAL CA: CPT | Performed by: HOSPITALIST

## 2023-10-23 PROCEDURE — 250N000011 HC RX IP 250 OP 636: Mod: JZ | Performed by: INTERNAL MEDICINE

## 2023-10-23 PROCEDURE — 36415 COLL VENOUS BLD VENIPUNCTURE: CPT | Performed by: HOSPITALIST

## 2023-10-23 PROCEDURE — 120N000001 HC R&B MED SURG/OB

## 2023-10-23 PROCEDURE — 82607 VITAMIN B-12: CPT | Performed by: HOSPITALIST

## 2023-10-23 PROCEDURE — 99232 SBSQ HOSP IP/OBS MODERATE 35: CPT | Performed by: INTERNAL MEDICINE

## 2023-10-23 PROCEDURE — 258N000003 HC RX IP 258 OP 636: Performed by: HOSPITALIST

## 2023-10-23 PROCEDURE — 85060 BLOOD SMEAR INTERPRETATION: CPT | Performed by: PATHOLOGY

## 2023-10-23 PROCEDURE — 82746 ASSAY OF FOLIC ACID SERUM: CPT | Performed by: HOSPITALIST

## 2023-10-23 PROCEDURE — 258N000003 HC RX IP 258 OP 636: Performed by: INTERNAL MEDICINE

## 2023-10-23 PROCEDURE — 250N000013 HC RX MED GY IP 250 OP 250 PS 637: Performed by: INTERNAL MEDICINE

## 2023-10-23 PROCEDURE — 85045 AUTOMATED RETICULOCYTE COUNT: CPT | Performed by: HOSPITALIST

## 2023-10-23 PROCEDURE — 85025 COMPLETE CBC W/AUTO DIFF WBC: CPT | Performed by: HOSPITALIST

## 2023-10-23 PROCEDURE — 83735 ASSAY OF MAGNESIUM: CPT | Performed by: HOSPITALIST

## 2023-10-23 PROCEDURE — 82330 ASSAY OF CALCIUM: CPT | Performed by: HOSPITALIST

## 2023-10-23 PROCEDURE — 83605 ASSAY OF LACTIC ACID: CPT | Performed by: HOSPITALIST

## 2023-10-23 PROCEDURE — 82728 ASSAY OF FERRITIN: CPT | Performed by: HOSPITALIST

## 2023-10-23 PROCEDURE — 250N000013 HC RX MED GY IP 250 OP 250 PS 637: Performed by: HOSPITALIST

## 2023-10-23 RX ORDER — LIDOCAINE 4 G/G
1 PATCH TOPICAL
Status: DISCONTINUED | OUTPATIENT
Start: 2023-10-24 | End: 2023-10-23

## 2023-10-23 RX ORDER — LIDOCAINE 4 G/G
1 PATCH TOPICAL
Status: DISCONTINUED | OUTPATIENT
Start: 2023-10-23 | End: 2023-10-24 | Stop reason: HOSPADM

## 2023-10-23 RX ADMIN — VANCOMYCIN HYDROCHLORIDE 1500 MG: 5 INJECTION, POWDER, LYOPHILIZED, FOR SOLUTION INTRAVENOUS at 20:44

## 2023-10-23 RX ADMIN — POTASSIUM & SODIUM PHOSPHATES POWDER PACK 280-160-250 MG 2 PACKET: 280-160-250 PACK at 17:24

## 2023-10-23 RX ADMIN — POTASSIUM & SODIUM PHOSPHATES POWDER PACK 280-160-250 MG 2 PACKET: 280-160-250 PACK at 20:44

## 2023-10-23 RX ADMIN — ACETAMINOPHEN 650 MG: 325 TABLET ORAL at 23:54

## 2023-10-23 RX ADMIN — POTASSIUM & SODIUM PHOSPHATES POWDER PACK 280-160-250 MG 2 PACKET: 280-160-250 PACK at 09:14

## 2023-10-23 RX ADMIN — POTASSIUM & SODIUM PHOSPHATES POWDER PACK 280-160-250 MG 2 PACKET: 280-160-250 PACK at 14:26

## 2023-10-23 RX ADMIN — CEFTRIAXONE SODIUM 2 G: 2 INJECTION, POWDER, FOR SOLUTION INTRAMUSCULAR; INTRAVENOUS at 05:43

## 2023-10-23 RX ADMIN — LIDOCAINE 1 PATCH: 4 PATCH TOPICAL at 09:27

## 2023-10-23 RX ADMIN — CEFTRIAXONE SODIUM 2 G: 2 INJECTION, POWDER, FOR SOLUTION INTRAMUSCULAR; INTRAVENOUS at 16:26

## 2023-10-23 RX ADMIN — POTASSIUM PHOSPHATE, MONOBASIC POTASSIUM PHOSPHATE, DIBASIC 15 MMOL: 224; 236 INJECTION, SOLUTION, CONCENTRATE INTRAVENOUS at 09:15

## 2023-10-23 ASSESSMENT — ACTIVITIES OF DAILY LIVING (ADL)
ADLS_ACUITY_SCORE: 31
ADLS_ACUITY_SCORE: 32
ADLS_ACUITY_SCORE: 31
ADLS_ACUITY_SCORE: 32
ADLS_ACUITY_SCORE: 31
ADLS_ACUITY_SCORE: 31
ADLS_ACUITY_SCORE: 32
ADLS_ACUITY_SCORE: 31

## 2023-10-23 NOTE — PROGRESS NOTES
"[place] [facility] Progress Note    SUBJECTIVE:  Pt is so much better.  Son here.  Alert.        REVIEW OF SYSTEMS:  Negative unless as listed above.  Social history, Family history, Medications: reviewed.    OBJECTIVE:  BP 97/64 (BP Location: Left arm)   Pulse 74   Temp 97.9  F (36.6  C) (Oral)   Resp 16   Ht 1.727 m (5' 8\")   Wt 93 kg (205 lb 0.4 oz)   SpO2 96%   BMI 31.17 kg/m                PHYSICAL EXAM:  Alert, awake  Vitals tabulated above, reviewed  Neck supple without lymphadenopathy  Sclera normal color, not injected  CARDIOVASCULAR regular rate and rhythm, no murmur  Lungs CLEAR TO AUSCULTATION   Abdomen soft, NT/ND, absent HEPATOSPLENOMEGALY  Skin normal  Joints normal  Neurologic exam non focal    Antibiotics:Ctx and vanco    Pertinent labs:  Lab Results   Component Value Date    WBC 2.5 10/23/2023     Lab Results   Component Value Date    RBC 3.24 10/23/2023     Lab Results   Component Value Date    HGB 10.9 10/23/2023     Lab Results   Component Value Date    HCT 30.7 10/23/2023     No components found for: \"MCT\"  Lab Results   Component Value Date    MCV 95 10/23/2023     Lab Results   Component Value Date    MCH 33.6 10/23/2023     Lab Results   Component Value Date    MCHC 35.5 10/23/2023     Lab Results   Component Value Date    RDW 12.9 10/23/2023     Lab Results   Component Value Date    PLT 45 10/23/2023       Last Comprehensive Metabolic Panel:  Sodium   Date Value Ref Range Status   10/23/2023 137 135 - 145 mmol/L Final     Comment:     Reference intervals for this test were updated on 09/26/2023 to more accurately reflect our healthy population. There may be differences in the flagging of prior results with similar values performed with this method. Interpretation of those prior results can be made in the context of the updated reference intervals.      Potassium   Date Value Ref Range Status   10/23/2023 3.6 3.4 - 5.3 mmol/L Final   06/04/2022 3.9 3.5 - 5.0 mmol/L Final "     Chloride   Date Value Ref Range Status   10/23/2023 107 98 - 107 mmol/L Final   06/04/2022 108 (H) 98 - 107 mmol/L Final     Carbon Dioxide (CO2)   Date Value Ref Range Status   10/23/2023 22 22 - 29 mmol/L Final   06/04/2022 23 22 - 31 mmol/L Final     Anion Gap   Date Value Ref Range Status   10/23/2023 8 7 - 15 mmol/L Final   06/04/2022 11 5 - 18 mmol/L Final     Glucose   Date Value Ref Range Status   10/23/2023 143 (H) 70 - 99 mg/dL Final   06/04/2022 149 (H) 70 - 125 mg/dL Final     GLUCOSE BY METER POCT   Date Value Ref Range Status   10/19/2023 110 (H) 70 - 99 mg/dL Final     Urea Nitrogen   Date Value Ref Range Status   10/23/2023 18.9 8.0 - 23.0 mg/dL Final   06/04/2022 27 8 - 28 mg/dL Final     Creatinine   Date Value Ref Range Status   10/23/2023 0.85 0.67 - 1.17 mg/dL Final     GFR Estimate   Date Value Ref Range Status   10/23/2023 >90 >60 mL/min/1.73m2 Final   04/16/2021 >60 >60 mL/min/1.73m2 Final     GFR, ESTIMATED POCT   Date Value Ref Range Status   09/27/2021 >60 >60 mL/min/1.73m2 Final     Calcium   Date Value Ref Range Status   10/23/2023 7.0 (L) 8.8 - 10.2 mg/dL Final       Liver Function Studies -   Recent Labs   Lab Test 10/21/23  1308   PROTTOTAL 6.8   ALBUMIN 3.9   BILITOTAL 0.5   ALKPHOS 74   AST 29   ALT 17       Erythrocyte Sedimentation Rate   Date Value Ref Range Status   09/01/2019 39 (H) 0 - 15 mm/hr Final       CRP   Date Value Ref Range Status   03/23/2022 1.1 (H) 0.0-<0.8 mg/dL Final               MICROBIOLOGY DATA:    Culture 50,000-100,000 CFU/mL Klebsiella oxytoca Abnormal       10,000-50,000 CFU/mL Enterococcus faecalis Abnormal            Resulting Agency: [facility]     Susceptibility       Klebsiella oxytoca     [id number]     Ampicillin  Resistant 1     Ampicillin/ Sulbactam 8 ug/mL Susceptible     Cefazolin 16 ug/mL Susceptible 2     Cefepime <=1 ug/mL Susceptible     Cefoxitin <=4 ug/mL Susceptible     Ceftazidime <=1 ug/mL Susceptible     Ceftriaxone <=1 ug/mL Susceptible      Ciprofloxacin <=0.25 ug/mL Susceptible     Gentamicin <=1 ug/mL Susceptible     Levofloxacin <=0.12 ug/mL Susceptible     Nitrofurantoin 32 ug/mL Susceptible     Piperacillin/Tazobactam <=4 ug/mL Susceptible     Tobramycin <=1 ug/mL Susceptible     Trimethoprim/Sulfamethoxazole <=1/19 ug/mL Susceptible                          IMAGING/RADIOLOGY:          ASSESSMENT:  Case does best fit now with mild UTI, urine not dramatically heavy with growth  AMS has cleared  Prostate cancer    RECOMMENDATION:  Augmentin tomorrow 875mg PO BID times a week.   ESTELLA attending MD in person  Sign off thank you  MATY Zamarripa MD  Office [phone] option 2 to desk staff

## 2023-10-23 NOTE — PLAN OF CARE
Problem: Fever  Goal: Body Temperature in Desired Range  Outcome: Not Progressing     Problem: Adult Inpatient Plan of Care  Goal: Optimal Comfort and Wellbeing  Outcome: Not Progressing  Intervention: Monitor Pain and Promote Comfort  Recent Flowsheet Documentation  Taken 10/22/2023 1643 by Karly Whitney, RN  Pain Management Interventions: medication (see MAR)     Problem: Risk for Delirium  Goal: Improved Behavioral Control  Outcome: Not Progressing   Goal Outcome Evaluation:    - VSS  - PIV and PICC patent, PICC red infusing ABX  - Up to bathroom w/ BM  - Jaimes draining  - Intermittent confusion, redirectable    Karly Whitney, RN

## 2023-10-23 NOTE — PROGRESS NOTES
[facility]    Medicine Progress Note - Hospitalist Service    Date of Admission:  10/21/2023    Assessment & Plan   72-year-old male with history of prostate cancer with mets to the bone on chemotherapy, chronic Jaimes catheter, HTN, back pain with sciatica brought in by family on 10/21 with fever, chills, and confusion at home.  In the ER noted to have fever of 103.4.  CT head was negative.  CT C/A/P negative for any acute infectious etiology; noted cholelithiasis, scattered sclerotic osseous mets, moderate stool noted.  COVID-19, flu, and RSV testing negative.  Due to concern for meningitis LP was performed.  Patient was started on broad-spectrum antibiotic, ampicillin, ceftriaxone, and vancomycin. ID was consulted.  His mental status and overall presentation was much improved day after admission and antibiotics were de-escalated given relatively clean LP.  Meningitis panel was negative.  Urine cultures from 2 days prior to admission when patient accidentally stepped and pulled his Jaimes catheter out and had to get it replaced, were positive for Klebsiella oxytoca and Enterococcus faecalis.    Labs were notable for acute on chronic pancytopenia in the setting of sepsis, and mild ANGEL which is prerenal.    -Mental status improved.  Continue ceftriaxone and vancomycin, awaiting culture results.  Monitor thrombocytopenia.    Sepsis, Recurrent Fever  Likely UTI  WBC < 4, T > 100.4.  Suspect source of infection is likely urinary as patient stepped on his Jaimes catheter 2 days prior to admission and it fell out, requiring replacement.  -Mental status back to baseline; febrile yday afternoon  -ID consulted, appreciate recs  -Blood Cx (10/21) NGTD  -Urine Cx (10/19) growing Klebsiella oxytoca and Enterococcus faecalis, sensitivity pending  -Ceftx and Vanco (start date 10/21) to cover urinary source    Acute on Chronic Thrombocytopenia  Pancytopenia  Patient is not on chemotherapy.  Presumably when  "patient stepped on Jaimes catheter and cath dislodgment may have caused microtrauma and mild bleed resulting in platelet consumption.  UA from 10/19 and 10/21 notes RBCs.  Likely acutely worse due to sepsis.  -Peripheral smear ordered  -Check anemia panel; check ferritin although could be falsely elevated due to infection  -Continue to monitor at this time  -Will discuss with hematology    Constipation  Seen on CT.  -Senna and MiraLAX    Prostate cancer with metastasis  Follows at [facility]. Receives lutetium-177 treatments.  -Is not on chemo currently; receives treatments every 6 weeks    Chronic Jaimes Catheter   Recently exchanged a few days ago in ED    HypoPhos  HypoCa  -Replete IV and PO and monitor    Benign essential hypertension  -Hold blood pressure meds given sepsis    Resolved Problems  Acute Encephalopathy   Ruled Out Meningitis    Mild ANGEL  Creatinine 1.28 on admission. Baseline Cr ~ 0.8. Likely pre-renal due to sepsis.        Diet: Regular Diet Adult    DVT Prophylaxis: Pneumatic Compression Devices (given low platelet counts, if improving plan for chemical prophylaxis)  Jaimes Catheter: PRESENT, indication: Retention  Lines: PRESENT      PICC 10/21/23 Triple Lumen Right Basilic Vascular Access-Site Assessment: WDL      Cardiac Monitoring: None  Code Status: Full Code      Clinically Significant Risk Factors          # Hypocalcemia: Lowest Ca = 6.9 mg/dL in last 2 days, will monitor and replace as appropriate        # Coagulation Defect: INR = 1.57 (Ref range: 0.85 - 1.15) and/or PTT = 35 Seconds (Ref range: 22 - 38 Seconds), will monitor for bleeding  # Thrombocytopenia: Lowest platelets = 42 in last 2 days, will monitor for bleeding   # Hypertension: Noted on problem list        # Obesity: Estimated body mass index is 31.17 kg/m  as calculated from the following:    Height as of this encounter: 1.727 m (5' 8\").    Weight as of this encounter: 93 kg (205 lb 0.4 oz)., PRESENT ON ADMISSION        "     Disposition Plan      Expected Discharge Date: 10/25/2023    Discharge Delays: IV Medication - consider oral or Home Infusion    Discharge Comments: cultures - abx plan            Miguel Valadez MD  Hospitalist Service  [facility]  Securely message with [employer] (more info)  Text page via [employer] Paging/Directory   ______________________________________________________________________    Interval History   No acute events overnight.     Patient seen and evaluated at bedside.  Feels relatively well this morning.  Does have a little neck discomfort.  Denies any chest pain, shortness of breath, abdominal pain.  Denies any sensation in his bladder.    Physical Exam   Vital Signs: Temp: 97.9  F (36.6  C) Temp src: Oral BP: 97/64 Pulse: 74   Resp: 16 SpO2: 96 % O2 Device: None (Room air)    Weight: 205 lbs .44 oz    General: NAD, standing  CV: +S1/S2, no pitting edema  Respiratory: clear to auscultation bilaterally  GI: soft, NT, ND  : Jaimes catheter in place draining clear urine  Neuro: alert    Medical Decision Making               Data     I have personally reviewed the following data over the past 24 hrs:    2.5 (L)  \   10.9 (L)   / 45 (LL)     137 107 18.9 /  143 (H)   3.6 22 0.85 \     Procal: N/A CRP: N/A Lactic Acid: 0.9       Ferritin:  N/A % Retic:  1.1 LDH:  N/A       Imaging results reviewed over the past 24 hrs:   No results found for this or any previous visit (from the past 24 hour(s)).

## 2023-10-23 NOTE — PROGRESS NOTES
CLINICAL NUTRITION SERVICES - ASSESSMENT NOTE     Nutrition Prescription    RECOMMENDATIONS FOR MDs/PROVIDERS TO ORDER:  Recommend MVI/M with decreased oral intakes     Malnutrition Status:    Not Met, at risk for malnutrition     Recommendations already ordered by Registered Dietitian (RD):  Ensure Enlive Strawberry TID at meals     Future/Additional Recommendations:  Monitor po, supplement romero., weight, labs.      REASON FOR ASSESSMENT  Venkatesh Alvarado is a/an 72 year old male assessed by the dietitian for Admission Nutrition Risk Screen for unsure wt loss and decreased appetite.     HPI: Per MD, Pt w/ h/o prostate cancer with mets to the bone on chemotherapy, chronic Jaimes catheter, HTN, back pain with sciatica brought in by family on 10/21 with fever, chills, and confusion at home. Noted cholelithiasis, scattered sclerotic osseous mets, moderate stool noted. Urine cultures from 2 days prior to admission when patient accidentally stepped and pulled his Jaimes catheter out and had to get it replaced, were positive for Klebsiella oxytoca and Enterococcus faecalis.     NUTRITION HISTORY  Met with pt at bedside this afternoon. Pt reports decreased appetite and poor oral intakes x3 days. Pt states he has chronic dry mouth, muscle loss, no known wt loss. At baseline pt eats 2-3 meals/day, breakfast typically includes bread, protein food, and fruit, lunch of sandwich and large dinner meal of protein food- meatloaf, burgers, chicken, vegetables, and carbs or fast food/dine in restaurants ([employer], [employer], etc.).   NKFA    K and Na phosphates given this AM.    CURRENT NUTRITION ORDERS  Diet: Regular  Intake/Tolerance: Fair   10/22: 75% x1 meal per nursing documentation, pt ordered 2 meals this admit- totaling 1195 kcal 47 g protein. Meets 50-75% estimated calorie and protein needs.     LABS  Reviewed  Phos 1.2(L)   Glucose 143    MEDICATIONS  Reviewed   Scheduled rocephin, miralax, senokot, vancomycin  "    ANTHROPOMETRICS  Height: 172.7 cm (5' 8\")  Most Recent Weight: 93 kg (205 lb 0.4 oz)    IBW: 70 kg  BMI: Obesity Grade I BMI 30-34.9  Weight History: No clinically significant changes   Wt Readings from Last 10 Encounters:   10/21/23 93 kg (205 lb 0.4 oz)   10/19/23 93 kg (205 lb)   04/19/23 94.3 kg (208 lb)   06/02/22 90.2 kg (198 lb 12.8 oz)   03/23/22 95.7 kg (211 lb)   11/12/21 95.3 kg (210 lb)   10/29/21 95.3 kg (210 lb)   04/27/21 94.3 kg (208 lb)   03/10/20 90.9 kg (200 lb 8 oz)   10/30/19 88.8 kg (195 lb 12 oz)        Dosing Weight: 75.75 kg adjusted     ASSESSED NUTRITION NEEDS  Estimated Energy Needs: 9437-6026 kcals/day (25 - 30 kcals/kg)  Justification: Maintenance  Estimated Protein Needs: 75-91 grams protein/day (1 - 1.2+ grams of pro/kg)  Justification: Increased needs  Estimated Fluid Needs: 2505-2835 mL/day (25 - 30 mL/kg)   Justification: Maintenance    PHYSICAL FINDINGS  See malnutrition section below.  Per Flowsheets:   +UOP, x1 BM this AM   700 ml po noted     MALNUTRITION:  % Weight Loss:  None noted  % Intake:  Decreased intake does not meet criteria for malnutrition, <75% x 3 days   Subcutaneous Fat Loss:  None observed  Muscle Loss:  Clavicle bone region moderate depletion, Acromion bone region moderate depletion, Scapular bone region moderate depletion, and Posterior calf region moderate depletion  Fluid Retention:  None noted    Malnutrition Diagnosis: Patient does not meet two of the above criteria necessary for diagnosing malnutrition, at risk for malnutiriton    NUTRITION DIAGNOSIS  Inadequate oral intake related to decreased appetite as evidenced by pt meeting <75% estimated nutrition needs.       INTERVENTIONS  Implementation  Ensure Enlive Strawberry TID at meals   Education- emphasized adequate protein, calories, fluid, discussed strategies to combat dry mouth, prevent further muscle losses.      Goals  Patient to consume % of nutritionally adequate meals three times per " day, or the equivalent with supplements/snacks.  Electrolytes WNL     Monitoring/Evaluation  Progress toward goals will be monitored and evaluated per protocol.

## 2023-10-23 NOTE — PLAN OF CARE
Goal Outcome Evaluation:    Problem: Adult Inpatient Plan of Care  Goal: Optimal Comfort and Wellbeing      Problem: Risk for Delirium  Goal: Improved Behavioral Control         Pt complained of slight neck pain, controlled with lidocaine patch. Intermittent confusion, easily reorientable. No significant events, calm and pleasant.

## 2023-10-23 NOTE — PLAN OF CARE
Problem: Fever  Goal: Body Temperature in Desired Range  Outcome: Progressing     Problem: UTI (Urinary Tract Infection)  Goal: Improved Infection Symptoms  Outcome: Progressing     Patient slept well overnight. Up to ambulate hallways with standby assist, improved activity tolerance. No fevers recorded overnight. IV abx per orders.     Paul Pinto RN

## 2023-10-24 ENCOUNTER — APPOINTMENT (OUTPATIENT)
Dept: PHYSICAL THERAPY | Facility: HOSPITAL | Age: 73
DRG: 698 | End: 2023-10-24
Attending: HOSPITALIST
Payer: MEDICARE

## 2023-10-24 VITALS
WEIGHT: 205.03 LBS | OXYGEN SATURATION: 95 % | HEART RATE: 80 BPM | DIASTOLIC BLOOD PRESSURE: 77 MMHG | BODY MASS INDEX: 31.07 KG/M2 | SYSTOLIC BLOOD PRESSURE: 104 MMHG | TEMPERATURE: 97.8 F | RESPIRATION RATE: 18 BRPM | HEIGHT: 68 IN

## 2023-10-24 LAB
ANION GAP SERPL CALCULATED.3IONS-SCNC: 10 MMOL/L (ref 7–15)
BUN SERPL-MCNC: 13.8 MG/DL (ref 8–23)
CA-I BLD-MCNC: 3.8 MG/DL (ref 4.4–5.2)
CALCIUM SERPL-MCNC: 6.8 MG/DL (ref 8.8–10.2)
CHLORIDE SERPL-SCNC: 108 MMOL/L (ref 98–107)
CREAT SERPL-MCNC: 0.68 MG/DL (ref 0.67–1.17)
DEPRECATED HCO3 PLAS-SCNC: 23 MMOL/L (ref 22–29)
EGFRCR SERPLBLD CKD-EPI 2021: >90 ML/MIN/1.73M2
ERYTHROCYTE [DISTWIDTH] IN BLOOD BY AUTOMATED COUNT: 13.2 % (ref 10–15)
GLUCOSE SERPL-MCNC: 112 MG/DL (ref 70–99)
HCT VFR BLD AUTO: 31.3 % (ref 40–53)
HGB BLD-MCNC: 11 G/DL (ref 13.3–17.7)
MAGNESIUM SERPL-MCNC: 2.3 MG/DL (ref 1.7–2.3)
MCH RBC QN AUTO: 33 PG (ref 26.5–33)
MCHC RBC AUTO-ENTMCNC: 35.1 G/DL (ref 31.5–36.5)
MCV RBC AUTO: 94 FL (ref 78–100)
PATH REPORT.COMMENTS IMP SPEC: NORMAL
PATH REPORT.COMMENTS IMP SPEC: NORMAL
PATH REPORT.FINAL DX SPEC: NORMAL
PATH REPORT.RELEVANT HX SPEC: NORMAL
PHOSPHATE SERPL-MCNC: 1.9 MG/DL (ref 2.5–4.5)
PLATELET # BLD AUTO: 42 10E3/UL (ref 150–450)
POTASSIUM SERPL-SCNC: 4.2 MMOL/L (ref 3.4–5.3)
RBC # BLD AUTO: 3.33 10E6/UL (ref 4.4–5.9)
SODIUM SERPL-SCNC: 141 MMOL/L (ref 135–145)
WBC # BLD AUTO: 2.7 10E3/UL (ref 4–11)

## 2023-10-24 PROCEDURE — 97110 THERAPEUTIC EXERCISES: CPT | Mod: GP

## 2023-10-24 PROCEDURE — 99239 HOSP IP/OBS DSCHRG MGMT >30: CPT | Performed by: HOSPITALIST

## 2023-10-24 PROCEDURE — 85027 COMPLETE CBC AUTOMATED: CPT | Performed by: HOSPITALIST

## 2023-10-24 PROCEDURE — 84100 ASSAY OF PHOSPHORUS: CPT | Performed by: HOSPITALIST

## 2023-10-24 PROCEDURE — 97116 GAIT TRAINING THERAPY: CPT | Mod: GP

## 2023-10-24 PROCEDURE — G0008 ADMIN INFLUENZA VIRUS VAC: HCPCS | Performed by: HOSPITALIST

## 2023-10-24 PROCEDURE — 90662 IIV NO PRSV INCREASED AG IM: CPT | Performed by: HOSPITALIST

## 2023-10-24 PROCEDURE — 80048 BASIC METABOLIC PNL TOTAL CA: CPT | Performed by: HOSPITALIST

## 2023-10-24 PROCEDURE — 250N000011 HC RX IP 250 OP 636: Performed by: HOSPITALIST

## 2023-10-24 PROCEDURE — 83735 ASSAY OF MAGNESIUM: CPT | Performed by: HOSPITALIST

## 2023-10-24 PROCEDURE — 82330 ASSAY OF CALCIUM: CPT | Performed by: HOSPITALIST

## 2023-10-24 PROCEDURE — 250N000013 HC RX MED GY IP 250 OP 250 PS 637: Performed by: HOSPITALIST

## 2023-10-24 PROCEDURE — 250N000011 HC RX IP 250 OP 636: Mod: JZ | Performed by: INTERNAL MEDICINE

## 2023-10-24 PROCEDURE — 97162 PT EVAL MOD COMPLEX 30 MIN: CPT | Mod: GP

## 2023-10-24 RX ORDER — POLYETHYLENE GLYCOL 3350 17 G/17G
17 POWDER, FOR SOLUTION ORAL DAILY PRN
Qty: 30 EACH | Refills: 0 | Status: SHIPPED | OUTPATIENT
Start: 2023-10-24

## 2023-10-24 RX ORDER — SENNOSIDES 8.6 MG
2 TABLET ORAL DAILY PRN
Qty: 60 TABLET | Refills: 0 | Status: SHIPPED | OUTPATIENT
Start: 2023-10-24

## 2023-10-24 RX ORDER — CEFTRIAXONE 2 G/1
2 INJECTION, POWDER, FOR SOLUTION INTRAMUSCULAR; INTRAVENOUS EVERY 24 HOURS
Status: DISCONTINUED | OUTPATIENT
Start: 2023-10-25 | End: 2023-10-24 | Stop reason: HOSPADM

## 2023-10-24 RX ORDER — AMLODIPINE BESYLATE 2.5 MG/1
5 TABLET ORAL DAILY
Start: 2023-10-24 | End: 2023-10-31

## 2023-10-24 RX ADMIN — INFLUENZA A VIRUS A/VICTORIA/4897/2022 IVR-238 (H1N1) ANTIGEN (FORMALDEHYDE INACTIVATED), INFLUENZA A VIRUS A/DARWIN/9/2021 SAN-010 (H3N2) ANTIGEN (FORMALDEHYDE INACTIVATED), INFLUENZA B VIRUS B/PHUKET/3073/2013 ANTIGEN (FORMALDEHYDE INACTIVATED), AND INFLUENZA B VIRUS B/MICHIGAN/01/2021 ANTIGEN (FORMALDEHYDE INACTIVATED) 0.7 ML: 60; 60; 60; 60 INJECTION, SUSPENSION INTRAMUSCULAR at 11:58

## 2023-10-24 RX ADMIN — POTASSIUM & SODIUM PHOSPHATES POWDER PACK 280-160-250 MG 2 PACKET: 280-160-250 PACK at 09:35

## 2023-10-24 RX ADMIN — CEFTRIAXONE SODIUM 2 G: 2 INJECTION, POWDER, FOR SOLUTION INTRAMUSCULAR; INTRAVENOUS at 04:12

## 2023-10-24 RX ADMIN — POTASSIUM & SODIUM PHOSPHATES POWDER PACK 280-160-250 MG 2 PACKET: 280-160-250 PACK at 13:52

## 2023-10-24 ASSESSMENT — ACTIVITIES OF DAILY LIVING (ADL)
ADLS_ACUITY_SCORE: 31

## 2023-10-24 NOTE — PROVIDER NOTIFICATION
Text page sent to Dr. Valadez: pt states he's been feeling light headed with walking and some after, OF=312/77, do you want his amlodipine held at discharge until he follows up with PCP?     Follow up from provider instructs to hold amlodipine until follow up with PCP

## 2023-10-24 NOTE — PLAN OF CARE
Physical Therapy Discharge Summary    Reason for therapy discharge:    Discharged to home with home therapy.    Progress towards therapy goal(s). See goals on Care Plan in [facility] electronic health record for goal details.  Goals met    Therapy recommendation(s):    Continued therapy is recommended.  Rationale/Recommendations:  Continue with home exercise program and home PT.

## 2023-10-24 NOTE — DISCHARGE SUMMARY
[facility]  Hospitalist Discharge Summary      Date of Admission:  10/21/2023  Date of Discharge:  10/24/2023  Discharging Provider: Miguel Valadez MD  Discharge Service: Hospitalist Service    Discharge Diagnoses   Sepsis, Recurrent Fever  Likely UTI    Follow-ups Needed After Discharge   Follow-up Appointments     Follow-up and recommended labs and tests       Follow up with primary care provider, Siva Rios MD, within 7 days   for hospital follow- up.  The following labs/tests are recommended: CBC,   BMP on 10/27/2023.        Restart Amlodipine if BP is okay    Unresulted Labs Ordered in the Past 30 Days of this Admission       Date and Time Order Name Status Description    10/21/2023  7:17 PM Cerebrospinal fluid Aerobic Bacterial Culture Routine Preliminary     10/21/2023  1:11 PM Blood Culture Peripheral Blood Preliminary     10/21/2023  1:11 PM Blood Culture Peripheral Blood Preliminary         These results will be followed up by PCP    Discharge Disposition   Discharged to home  Condition at discharge: Stable    Hospital Course   72-year-old male with history of prostate cancer with mets to the bone on chemotherapy, chronic Jaimes catheter, HTN, back pain with sciatica brought in by family on 10/21 with fever, chills, and confusion at home.  In the ER noted to have fever of 103.4.  CT head was negative.  CT C/A/P negative for any acute infectious etiology; noted cholelithiasis, scattered sclerotic osseous mets, moderate stool noted.  COVID-19, flu, and RSV testing negative.  Due to concern for meningitis LP was performed.  Patient was started on broad-spectrum antibiotic, ampicillin, ceftriaxone, and vancomycin. ID was consulted.  His mental status and overall presentation was much improved day after admission and antibiotics were de-escalated given relatively clean LP.  Meningitis panel was negative.  Urine cultures from 2 days prior to admission when patient accidentally  stepped and pulled his Jaimes catheter out and had to get it replaced, were positive for Klebsiella oxytoca and Enterococcus faecalis.  Mental status improved within 24 hours admission.  Antibiotics tailored to Augmentin on discharge; it was felt Enterococcus faecalis likely was not contributing much.    Labs were notable for acute on chronic pancytopenia in the setting of sepsis, and mild ANGEL which is prerenal.  Thrombocytopenia had a dk in the 40s, likely due to a combination of sepsis and lutetium-177.  Spoke with hematology did not recommend additional treatment at that time.    Sepsis, Recurrent Fever  Likely UTI  WBC < 4, T > 100.4.  Suspect source of infection is likely urinary as patient stepped on his Jaimes catheter 2 days prior to admission and it fell out, requiring replacement.  -Blood Cx (10/21) NGTD  -Urine Cx (10/19) growing Klebsiella oxytoca and Enterococcus faecalis  -Ceftx and Vanco in house (start date 10/21) and Augmentin on discharge for total 10-day course    Acute on Chronic Thrombocytopenia  Pancytopenia  Patient is on lutetium-177 which can cause thrombocytopenia and pancytopenia.  Presumably when patient stepped on Jaimes catheter and cath dislodgment may have caused microtrauma and mild bleed resulting in platelet consumption.  UA from 10/19 and 10/21 notes RBCs.  Likely acutely worse due to sepsis.  -Spoke with hematology briefly, did not feel need for additional work-up  -Plan for repeat CBC as outpatient    Constipation  Seen on CT.  -Senna and MiraLAX    Prostate cancer with metastasis  Follows at [facility]. Receives lutetium-177 treatments.  -Is not on chemo currently; receives treatments every 6 weeks    Chronic Jaimes Catheter   Recently exchanged a few days ago in ED    HypoPhos  HypoCa (resolved)    Benign essential hypertension  -Held amlodipine on discharge and reassess at PCP visit    Resolved Problems  Acute Encephalopathy   Ruled Out Meningitis    Mild ANGEL  Creatinine 1.28  on admission. Baseline Cr ~ 0.8. Likely pre-renal due to sepsis.    Consultations This Hospital Stay   VASCULAR ACCESS ADULT IP CONSULT  INFECTIOUS DISEASES IP CONSULT  PHARMACY TO DOSE VANCO  PHYSICAL THERAPY ADULT IP CONSULT    Code Status   Full Code    Time Spent on this Encounter   I, Miguel Valdaez MD, personally saw the patient today and spent greater than 30 minutes discharging this patient.       Miguel Valadez MD  [address] [address]  Phone: [phone]  Fax: [phone]  ______________________________________________________________________    Physical Exam   Vital Signs: Temp: 98.7  F (37.1  C) Temp src: Oral BP: 104/77 Pulse: 80   Resp: 18 SpO2: 95 % O2 Device: None (Room air)    Weight: 205 lbs .44 oz    General: NAD, appears comfortable  CV: +S1/S2, no pitting edema  Respiratory: clear to auscultation bilaterally  GI: soft, NT, nondistended  : Jaimes in place draining clear urine  Neuro: alert       Primary Care Physician   Siva Rios MD    Discharge Orders      Home Care Referral      Primary Care - Care Coordination Referral      Follow-up and recommended labs and tests     Follow up with primary care provider, Siva Rios MD, within 7 days for hospital follow- up.  The following labs/tests are recommended: CBC, BMP on 10/27/2023.     Activity    Your activity upon discharge: activity as tolerated     Reason for your hospital stay    Confusion and sepsis likely due to urinary tract infection when your Jaimes catheter was pulled out a few days ago by accident.  You were treated with IV antibiotics and switched over to a pill antibiotic called Augmentin which you should take starting tomorrow.  Additionally your platelet count was low which was likely due to a combination of your medication for your prostate cancer and your infection.  Check your blood counts at your PCPs office on Friday, and follow-up with them next week.      Walker Order    I, the undersigned, certify that the above prescribed supplies are medically necessary for this patient and is both reasonable and necessary in reference to accepted standards of medical and necessary in reference to accepted standards of medical practice in the treatment of this patient's condition and is not prescribed as a convenience.      Diet    Follow this diet upon discharge: Regular Diet Adult       Significant Results and Procedures   Most Recent 3 CBC's:  Recent Labs   Lab Test 10/24/23  0612 10/23/23  0815 10/23/23  0548   WBC 2.7* 2.5* 2.3*   HGB 11.0* 10.9* 10.8*   MCV 94 95 94   PLT 42* 45* 42*     Most Recent 3 BMP's:  Recent Labs   Lab Test 10/24/23  0612 10/23/23  0548 10/22/23  0636    137 138   POTASSIUM 4.2 3.6 3.8   CHLORIDE 108* 107 107   CO2 23 22 21*   BUN 13.8 18.9 23.6*   CR 0.68 0.85 0.98   ANIONGAP 10 8 10   BECCA 6.8* 7.0* 6.9*   * 143* 118*     Most Recent 2 LFT's:  Recent Labs   Lab Test 10/21/23  1308 05/29/22  2131   AST 29 34   ALT 17 16   ALKPHOS 74 68   BILITOTAL 0.5 0.8     Most Recent 3 INR's:  Recent Labs   Lab Test 10/21/23  1308 03/23/22  1447 04/16/21  1305   INR 1.57* 1.26* 1.09   ,   Results for orders placed or performed during the hospital encounter of 10/21/23   Head CT w/o contrast    Narrative    EXAM: CT HEAD W/O CONTRAST  LOCATION: [facility]  DATE: 10/21/2023    INDICATION: confusion, fever.  prostate cancer  COMPARISON: 04/16/2021.  TECHNIQUE: Routine CT Head without IV contrast. Multiplanar reformats. Dose reduction techniques were used.    FINDINGS:  INTRACRANIAL CONTENTS: No midline shift. No acute intracranial hemorrhage. No hydrocephalus or extra-axial hemorrhage. 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 Intracranial   atheromatous changes are present. No acute loss of gray-white differentiation.    VISUALIZED  ORBITS/SINUSES/MASTOIDS: No intraorbital abnormality. No paranasal sinus mucosal disease. No middle ear or mastoid effusion.    BONES/SOFT TISSUES: No acute abnormality.      Impression    IMPRESSION:  1.  No acute intracranial abnormality. Chronic changes as above. 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    CT Chest/Abdomen/Pelvis w Contrast    Narrative    EXAM: CT CHEST/ABDOMEN/PELVIS W CONTRAST  LOCATION: [facility]  DATE: 10/21/2023    INDICATION: Fever. Sepsis. Confusion.  COMPARISON: CT abdomen pelvis 01/12/2021. CT chest, abdomen, and pelvis 09/03/2019.  TECHNIQUE: CT scan of the chest, abdomen, and pelvis was performed following injection of IV contrast. Multiplanar reformats were obtained. Dose reduction techniques were used.   CONTRAST: isovue 370 90ml    FINDINGS:     LUNGS AND PLEURA: Scarring and volume loss within left upper lobe adjacent to an osseous metastasis in the left second rib. Mild bibasilar atelectasis. Few punctate calcified granulomas. No consolidations, pleural effusions, or pneumothorax. Central   airways are patent.    MEDIASTINUM/AXILLAE: Right PICC tip is near the superior cavoatrial junction. Normal cardiac size. No pericardial effusion. 1 cm hypodense right thyroid nodule, which does not require follow-up. No enlarged thoracic lymph node. Central pulmonary arteries   are patent. Mild scattered atherosclerotic calcifications within the descending thoracic aorta.    CORONARY ARTERY CALCIFICATION: Mild.    HEPATOBILIARY: Cholelithiasis. No biliary ductal dilation.    PANCREAS: Normal.    SPLEEN: Normal.    ADRENAL GLANDS: Normal.    KIDNEYS/BLADDER: Normal kidneys, without hydronephrosis. Urinary bladder is partially decompressed by a Jaimes catheter.    BOWEL: No obstruction or inflammation. Moderate amount of stool throughout the descending and tortuous sigmoid  colon.    LYMPH NODES: No enlarged lymph nodes.    VASCULATURE: Mild aortobiiliac atherosclerosis. No abdominal aortic aneurysm. Patent portal and superior mesenteric veins.    PELVIC ORGANS: Prostate is present; no discrete prostate mass is identified.    MUSCULOSKELETAL: Small fat-containing right inguinal hernia. Multiple scattered sclerotic metastases within the spine, pelvis, and ribs, including metastases within left second rib; the 11th, 4th, and 2nd thoracic vertebral bodies; and the left iliac   wing. No pathologic fractures. Multilevel degenerative changes of the spine.      Impression    IMPRESSION:    1.  No acute abnormality or source of infection identified within the chest, abdomen, or pelvis.    2.  Scattered sclerotic osseous metastases as described. No pathologic fractures.    3.  Cholelithiasis.    4.  Moderate amount of stool throughout the descending and sigmoid colon, suggesting constipation.   XR Lumbar Puncture Spinal Tap Diag    Narrative    EXAM:  1. DIAGNOSTIC LUMBAR PUNCTURE  2. FLUOROSCOPIC GUIDANCE  LOCATION: [facility]  DATE: 10/21/2023    INDICATION: Neurologic changes. Encephalopathy. Fever.    PROCEDURE: Procedure and risks explained to patient and consent received. The patient was placed in prone position, and the lower back was prepped and draped in sterile fashion. 1% local lidocaine infused in local soft tissues.     Under direct fluoroscopic guidance, a 22 gauge spinal needle was placed into the spinal canal at the L5-S1 level.    SPECIMEN: 16 mL of clear CSF sent to lab.    COMPLICATIONS: None.    FLUOROSCOPIC TIME: 0.1 minutes.  NUMBER OF IMAGES: 1  DOSE: 1.9 mGy.      Impression    IMPRESSION:  Fluoroscopically guided lumbar puncture.       Discharge Medications   Current Discharge Medication List        START taking these medications    Details   amoxicillin-clavulanate (AUGMENTIN) 875-125 MG tablet Take 1 tablet by mouth 2 times daily for 12  doses  Qty: 12 tablet, Refills: 0    Associated Diagnoses: Urinary tract infection associated with catheterization of urinary tract, unspecified indwelling urinary catheter type, initial encounter       polyethylene glycol (MIRALAX) 17 g packet Take 17 g by mouth daily as needed for constipation  Qty: 30 each, Refills: 0    Associated Diagnoses: Constipation, unspecified constipation type      sennosides (SENOKOT) 8.6 MG tablet Take 2 tablets by mouth daily as needed for constipation  Qty: 60 tablet, Refills: 0    Associated Diagnoses: Constipation, unspecified constipation type           CONTINUE these medications which have CHANGED    Details   amLODIPine (NORVASC) 2.5 MG tablet Take 2 tablets (5 mg) by mouth daily Hold off on taking until you see your PCP    Associated Diagnoses: Essential hypertension           CONTINUE these medications which have NOT CHANGED    Details   acetaminophen (TYLENOL) 325 MG tablet Take 2 tablets (650 mg) by mouth every 6 hours as needed for mild pain or fever    Associated Diagnoses: Malignant tumor of prostate (H)      docusate sodium (COLACE) 100 MG capsule [DOCUSATE SODIUM (COLACE) 100 MG CAPSULE] Take 100 mg by mouth 2 (two) times a day as needed for constipation.      gabapentin (NEURONTIN) 100 MG capsule Take 1-2 capsules by mouth nightly as needed for neuropathic pain      leuprolide (ELIGARD) 45 MG kit Inject 45 mg Subcutaneous every 6 months           Allergies   No Known Allergies

## 2023-10-24 NOTE — PROGRESS NOTES
Care Management Discharge Note    Discharge Date: 10/24/2023       Discharge Disposition: Home    Discharge Services: None    Discharge DME: None    Discharge Transportation: family or friend will provide    Private pay costs discussed: Not applicable    Does the patient's insurance plan have a 3 day qualifying hospital stay waiver?  No    PAS Confirmation Code: N/A  Patient/family educated on Medicare website which has current facility and service quality ratings: no    Education Provided on the Discharge Plan: Yes  Persons Notified of Discharge Plans: patient  Patient/Family in Agreement with the Plan: yes    Handoff Referral Completed: Yes    Additional Information:  12:26 PM  SW met and spoke with Pt regarding therapy recommendations for home care services. Pt reported that he is not interested in starting home care services at this time. SW reported understanding and encouraged Pt to contact his primary clinic if interested in the additional support in the future. Pt reports that his son will stay with him for a few days following discharge. Pt's brother will transport Pt home at discharge. Pt denies having any further questions or needs from CM.     CM will sign off. Please contact CM if any additional needs arise prior to discharge.      AGUILA Rawls

## 2023-10-24 NOTE — PROGRESS NOTES
10/24/23 1105   Appointment Info   Signing Clinician's Name / Credentials (PT) Diane Tee PT   Living Environment   People in Home alone   Current Living Arrangements house  (split-entry [facility])   Home Accessibility stairs to enter home;stairs within home   Number of Stairs, Main Entrance 1   Stair Railings, Main Entrance none   Number of Stairs, Within Home, Primary six   Stair Railings, Within Home, Primary railings on both sides of stairs;railings safe and in good condition   Transportation Anticipated family or friend will provide   Living Environment Comments uses shower on lower level of home   Self-Care   Equipment Currently Used at Home cane, straight;other (see comments)  (chronic Jaimes catheter)   Activity/Exercise/Self-Care Comment independent ADL's/IADL's   General Information   Onset of Illness/Injury or Date of Surgery 10/21/23   Referring Physician Dr. Valadez   Patient/Family Therapy Goals Statement (PT) go home, get stronger   Pertinent History of Current Problem (include personal factors and/or comorbidities that impact the POC) encephalopathy, fever, UTI; PMH of prostate CA with mets to bone, chronic Jaimes catheter, back pain, HTN   Existing Precautions/Restrictions fall   Cognition   Affect/Mental Status (Cognition) WFL   Orientation Status (Cognition) oriented x 4   Follows Commands (Cognition) WFL   Range of Motion (ROM)   Range of Motion ROM is WFL   Strength (Manual Muscle Testing)   Strength (Manual Muscle Testing) Deficits observed during functional mobility   Transfers   Transfers sit-stand transfer   Sit-Stand Transfer   Sit-Stand [place] (Transfers) supervision   Gait/Stairs (Locomotion)   [place] Level (Gait) contact guard;supervision;other (see comments)  (pt wanting to go close to wall and hold onto railing at times)   Assistive Device (Gait) other (see comments)  (none)   Distance in Feet (Gait) 150   Pattern (Gait) step-through   Deviations/Abnormal Patterns (Gait)  weight shifting decreased;stride length decreased;gait speed decreased;tiny decreased;other (see comments)  (mild path deviation)   Negotiation (Stairs) stairs independence;handrail location;number of steps;ascending technique;descending technique   [place] Level (Stairs) supervision;verbal cues;nonverbal cues (demo/gesture)   Handrail Location (Stairs) both sides   Number of Steps (Stairs) 11   Ascending Technique (Stairs) step-over-step   Descending Technique (Stairs) step-to-step   Clinical Impression   Criteria for Skilled Therapeutic Intervention Yes, treatment indicated   PT Diagnosis (PT) impaired functional mobility   Influenced by the following impairments decreased strength, balance, activity tolerance, safety awareness   Functional limitations due to impairments gait, transfers, stairs   Clinical Presentation (PT Evaluation Complexity) stable   Clinical Presentation Rationale pt presents as medically diagnosed   Clinical Decision Making (Complexity) moderate complexity   Planned Therapy Interventions (PT) gait training;home exercise program;strengthening;transfer training;patient/family education;neuromuscular re-education   Risk & Benefits of therapy have been explained evaluation/treatment results reviewed;patient   PT Total Evaluation Time   PT Eval, Moderate Complexity Minutes (79726) 10   Physical Therapy Goals   PT Frequency One time eval and treatment only   PT Predicted Duration/Target Date for Goal Attainment 10/25/23   PT Goals Transfers;Gait;PT Goal 1   PT: Transfers Modified independent;Sit to/from stand;Assistive device   PT: Gait Modified independent;Greater than 200 feet;Rolling walker   PT: Goal 1 Demonstrate HEP for Le strength with handout and supervision   Interventions   Interventions Quick Adds Gait Training;Therapeutic Procedure   Therapeutic Procedure/Exercise   Ther. Procedure: strength, endurance, ROM, flexibillity Minutes (78688) 10   Symptoms Noted During/After Treatment  fatigue   Treatment Detail/Skilled Intervention issued handout of standing LE HEP and pt performed with sba x5-10 reps each, cuing and demonstration for technique and progression of exercises   Gait Training   Gait Training Minutes (00916) 15   Symptoms Noted During/After Treatment (Gait Training) none   Treatment Detail/Skilled Intervention gait and transfer training with FWW, cuing for safe use at home and for safe use of Jaimes catheter, recommending use of leg bag to avoid steeping on tubing and tripping; issued FWW for home use and educated pt in keeping on main level of home   Distance in Feet 200, 150   [place] Level (Gait Training) other (see comments)  (initial sba then mod independent)   Physical Assistance Level (Gait Training) supervision;verbal cues;nonverbal cues (demo/gestures)   Weight Bearing (Gait Training) full weight-bearing   Assistive Device (Gait Training) rolling walker   Pattern Analysis (Gait Training) swing-through gait   Impairments (Gait Analysis/Training) strength decreased;balance impaired   PT Discharge Planning   PT Plan d/c PT   PT Discharge Recommendation (DC Rec) home with assist;home with home care physical therapy   PT Rationale for DC Rec family assist for safety first few days; continue with home PT and OT for strengthening and mobility training and home safety assessment   PT Brief overview of current status amb. 200 feet with walker mod independent   PT Equipment Needed at Discharge walker, rolling   Total Session Time   Timed Code Treatment Minutes 25   Total Session Time (sum of timed and untimed services) 35

## 2023-10-24 NOTE — PLAN OF CARE
Problem: Adult Inpatient Plan of Care  Goal: Optimal Comfort and Wellbeing  Outcome: Progressing     Problem: UTI (Urinary Tract Infection)  Goal: Improved Infection Symptoms  Outcome: Progressing     Problem: Risk for Delirium  Goal: Improved Behavioral Control  Intervention: Minimize Safety Risk  Recent Flowsheet Documentation  Taken 10/23/2023 2217 by Qing Pham RN  Enhanced Safety Measures: room near unit station  Taken 10/23/2023 1600 by Qing Pham RN  Enhanced Safety Measures: room near unit station   Goal Outcome Evaluation:    Patient is A/Ox3 with intermittent confusion VSS on RA.

## 2023-10-24 NOTE — PLAN OF CARE
Problem: Risk for Delirium  Goal: Optimal Coping  Outcome: Progressing  Intervention: Optimize Psychosocial Adjustment to Delirium  Recent Flowsheet Documentation  Taken 10/24/2023 0005 by Jose Scanlon RN  Supportive Measures: active listening utilized     Problem: Fever  Goal: Body Temperature in Desired Range  Outcome: Progressing     Problem: UTI (Urinary Tract Infection)  Goal: Improved Infection Symptoms  Outcome: Progressing   Goal Outcome Evaluation:       Pt is A&O with intermittent confusion. Vital signs stable. Pt reported generalized body pain at a 1. PRN PO tylenol given. Jaimes produced output of 1600. Pt denies nausea. IV rocephin ran per orders. Platelet count at 42. Provider notified. Pt able to make needs known.   Jose Scanlon, RN          Platelet Count   Date Value Ref Range Status   10/24/2023 42 (LL) 150 - 450 10e3/uL Final

## 2023-10-24 NOTE — PLAN OF CARE
Goal Outcome Evaluation:    Problem: Adult Inpatient Plan of Care  Goal: Readiness for Transition of Care  Outcome: Adequate for Care Transition  Discharging to home today with brother for transport. Pt's son, Tim, was updated that patient is discharging today and informed about his follow up appointments and that he will be on Augmentin.     Pt was seen by PT today and provided with a walker. Pt does go back and forth on whether he feels ready to discharge or not, he lives alone, and has some ongoing weakness, but has ultimately made the decision that he will go home today.

## 2023-10-25 ENCOUNTER — PATIENT OUTREACH (OUTPATIENT)
Dept: CARE COORDINATION | Facility: CLINIC | Age: 73
End: 2023-10-25
Payer: MEDICARE

## 2023-10-25 DIAGNOSIS — N17.9 AKI (ACUTE KIDNEY INJURY) (H): ICD-10-CM

## 2023-10-25 DIAGNOSIS — D61.818 PANCYTOPENIA (H): Primary | ICD-10-CM

## 2023-10-25 NOTE — PROGRESS NOTES
Clinic Care Coordination Contact  [facility]: Post-Discharge Note  SITUATION                                                      Admission: 10/21/2023        Discharge: 10/24/2023     BACKGROUND                                                      Per hospital discharge summary and inpatient provider notes: 72-year-old male with history of prostate cancer with mets to the bone on chemotherapy, chronic Jaimes catheter, HTN, back pain with sciatica brought in by family on 10/21 with fever, chills, and confusion at home.  In the ER noted to have fever of 103.4.  CT head was negative.  CT C/A/P negative for any acute infectious etiology; noted cholelithiasis, scattered sclerotic osseous mets, moderate stool noted.  COVID-19, flu, and RSV testing negative.  Due to concern for meningitis LP was performed.  Patient was started on broad-spectrum antibiotic, ampicillin, ceftriaxone, and vancomycin. ID was consulted.  His mental status and overall presentation was much improved day after admission and antibiotics were de-escalated given relatively clean LP.  Meningitis panel was negative.  Urine cultures from 2 days prior to admission when patient accidentally stepped and pulled his Jaimes catheter out and had to get it replaced, were positive for Klebsiella oxytoca and Enterococcus faecalis.  Mental status improved within 24 hours admission.  Antibiotics tailored to Augmentin on discharge; it was felt Enterococcus faecalis likely was not contributing much.     ASSESSMENT    CCRN spoke with patient today via phone. Patient states he's doing really well after discharged from the hospital. INF scheduled on 10/31/2023 with PCP. Declined to be scheduled with MTM stating that he's doing fine with his medication at this time but plan to talk to Dr Rios next week. Patient declined care coordination but asked for [facility]N direct line to call in the future if he needs support from care coordination. CCRN provided patient with  direct work # to patient. Patient states someone called him from home care and he's expecting a call back from them.     PLAN                                                      Outpatient Plan:  Hospital follow up scheduled with PCP on 10/31/2023 at 10:15am.   Follow-up Appointments     Follow-up and recommended labs and tests       Follow up with primary care provider, Siva Rios MD, within 7 days   for hospital follow- up.  The following labs/tests are recommended: CBC,   BMP on 10/27/2023.      Future Appointments   Date Time Provider Department Center   10/27/2023  8:30 AM [facility] LAB [facility]   10/31/2023 10:30 AM [facility]         For any urgent concerns, please contact our 24 hour nurse triage line: [phone] ([phone])         Cecilia Lewis RN

## 2023-10-26 LAB
BACTERIA BLD CULT: NO GROWTH
BACTERIA BLD CULT: NO GROWTH

## 2023-10-27 ENCOUNTER — LAB (OUTPATIENT)
Dept: LAB | Facility: CLINIC | Age: 73
End: 2023-10-27
Payer: MEDICARE

## 2023-10-27 DIAGNOSIS — D61.818 PANCYTOPENIA (H): ICD-10-CM

## 2023-10-27 DIAGNOSIS — N17.9 AKI (ACUTE KIDNEY INJURY) (H): ICD-10-CM

## 2023-10-27 LAB
ANION GAP SERPL CALCULATED.3IONS-SCNC: 9 MMOL/L (ref 7–15)
BACTERIA CSF CULT: NO GROWTH
BUN SERPL-MCNC: 14.7 MG/DL (ref 8–23)
CALCIUM SERPL-MCNC: 8.4 MG/DL (ref 8.8–10.2)
CHLORIDE SERPL-SCNC: 107 MMOL/L (ref 98–107)
CREAT SERPL-MCNC: 0.85 MG/DL (ref 0.67–1.17)
DEPRECATED HCO3 PLAS-SCNC: 23 MMOL/L (ref 22–29)
EGFRCR SERPLBLD CKD-EPI 2021: >90 ML/MIN/1.73M2
ERYTHROCYTE [DISTWIDTH] IN BLOOD BY AUTOMATED COUNT: 13 % (ref 10–15)
GLUCOSE SERPL-MCNC: 102 MG/DL (ref 70–99)
GRAM STAIN RESULT: NORMAL
GRAM STAIN RESULT: NORMAL
HCT VFR BLD AUTO: 33.4 % (ref 40–53)
HGB BLD-MCNC: 11.5 G/DL (ref 13.3–17.7)
MCH RBC QN AUTO: 33.1 PG (ref 26.5–33)
MCHC RBC AUTO-ENTMCNC: 34.4 G/DL (ref 31.5–36.5)
MCV RBC AUTO: 96 FL (ref 78–100)
PLATELET # BLD AUTO: 75 10E3/UL (ref 150–450)
POTASSIUM SERPL-SCNC: 4.7 MMOL/L (ref 3.4–5.3)
RBC # BLD AUTO: 3.47 10E6/UL (ref 4.4–5.9)
SODIUM SERPL-SCNC: 139 MMOL/L (ref 135–145)
WBC # BLD AUTO: 4.3 10E3/UL (ref 4–11)

## 2023-10-27 PROCEDURE — 80048 BASIC METABOLIC PNL TOTAL CA: CPT

## 2023-10-27 PROCEDURE — 36415 COLL VENOUS BLD VENIPUNCTURE: CPT

## 2023-10-27 PROCEDURE — 85027 COMPLETE CBC AUTOMATED: CPT

## 2023-10-28 LAB
ATRIAL RATE - MUSE: 85 BPM
DIASTOLIC BLOOD PRESSURE - MUSE: 70 MMHG
INTERPRETATION ECG - MUSE: NORMAL
P AXIS - MUSE: 54 DEGREES
PR INTERVAL - MUSE: 154 MS
QRS DURATION - MUSE: 82 MS
QT - MUSE: 342 MS
QTC - MUSE: 406 MS
R AXIS - MUSE: -33 DEGREES
SYSTOLIC BLOOD PRESSURE - MUSE: 138 MMHG
T AXIS - MUSE: 18 DEGREES
VENTRICULAR RATE- MUSE: 85 BPM

## 2023-10-31 ENCOUNTER — OFFICE VISIT (OUTPATIENT)
Dept: FAMILY MEDICINE | Facility: CLINIC | Age: 73
End: 2023-10-31
Payer: MEDICARE

## 2023-10-31 VITALS
TEMPERATURE: 98 F | HEART RATE: 80 BPM | HEIGHT: 68 IN | WEIGHT: 196 LBS | BODY MASS INDEX: 29.7 KG/M2 | RESPIRATION RATE: 16 BRPM | SYSTOLIC BLOOD PRESSURE: 104 MMHG | DIASTOLIC BLOOD PRESSURE: 68 MMHG

## 2023-10-31 DIAGNOSIS — D61.818 PANCYTOPENIA (H): ICD-10-CM

## 2023-10-31 DIAGNOSIS — I10 ESSENTIAL HYPERTENSION: ICD-10-CM

## 2023-10-31 DIAGNOSIS — A41.9 SEPSIS, DUE TO UNSPECIFIED ORGANISM, UNSPECIFIED WHETHER ACUTE ORGAN DYSFUNCTION PRESENT (H): Primary | ICD-10-CM

## 2023-10-31 PROCEDURE — 99495 TRANSJ CARE MGMT MOD F2F 14D: CPT | Performed by: FAMILY MEDICINE

## 2023-10-31 NOTE — PROGRESS NOTES
"  Assessment & Plan     Sepsis, due to unspecified organism, unspecified whether acute organ dysfunction present (H)    Resolved.      Pancytopenia (H)    Improved.    Caused by chemo.      Essential hypertension    Observe off amlodipine.  Recheck if orthostasis not improving, or BP is high.             MED REC REQUIRED  Post Medication Reconciliation Status: discharge medications reconciled, continue medications without change  BMI:   Estimated body mass index is 29.8 kg/m  as calculated from the following:    Height as of this encounter: 1.727 m (5' 8\").    Weight as of this encounter: 88.9 kg (196 lb).           Siva Rios MD  [facility] LUCÍA Riddle is a 73 year old, presenting for the following health issues:  Hospital F/U        10/31/2023     9:44 AM   Additional Questions   Roomed by Brooke POWELL       [facility] Follow-up Visit:    Hospital/Nursing Home/IP Rehab Facility: [facility]  Date of Admission: 10/21/23  Date of Discharge: 10/24/23  Reason(s) for Admission: Sepsis    Was your hospitalization related to COVID-19? No   Problems taking medications regularly:  None  Medication changes since discharge: None  Problems adhering to non-medication therapy:  None    Summary of hospitalization:  [facility] discharge summary reviewed  Diagnostic Tests/Treatments reviewed.  Follow up needed: none  Other Healthcare Providers Involved in Patient s Care:         None  Update since discharge: improved.         Plan of care communicated with patient             He has been off amlodipine since hospital.  Feels mildly orthostatic.      Review of Systems   No fever or cough.      Objective    /68   Pulse 80   Temp 98  F (36.7  C) (Oral)   Resp 16   Ht 1.727 m (5' 8\")   Wt 88.9 kg (196 lb)   BMI 29.80 kg/m    Body mass index is 29.8 kg/m .  Physical Exam   Heart normal  Lungs normal    CBC improved on 10/27/23  BMP normal " on 10/27/23

## 2023-11-07 PROBLEM — E43 SEVERE MALNUTRITION (H): Status: RESOLVED | Noted: 2022-05-30 | Resolved: 2023-11-07

## 2023-11-07 PROBLEM — J96.01 ACUTE RESPIRATORY FAILURE WITH HYPOXIA (H): Status: RESOLVED | Noted: 2022-05-30 | Resolved: 2023-11-07

## 2023-11-13 ENCOUNTER — TRANSFERRED RECORDS (OUTPATIENT)
Dept: HEALTH INFORMATION MANAGEMENT | Facility: CLINIC | Age: 73
End: 2023-11-13
Payer: MEDICARE

## 2023-12-01 ENCOUNTER — TELEPHONE (OUTPATIENT)
Dept: FAMILY MEDICINE | Facility: CLINIC | Age: 73
End: 2023-12-01

## 2023-12-01 NOTE — TELEPHONE ENCOUNTER
Order/Referral Request    Who is requesting: Patient, per [facility] recommendations    Orders being requested: Cadiac work up for SOB    Reason service is needed/diagnosis: SOB    When are orders needed by: ASAP    Has this been discussed with Provider: No    Does patient have a preference on a Group/Provider/Facility? [facility]     Does patient have an appointment scheduled?: No    Where to send orders: Place orders within Epic    Could we send this information to you in [facility] or would you prefer to receive a phone call?:   Patient would prefer a phone call   Okay to leave a detailed message?: Yes at Home number on file [phone] (home)

## 2023-12-05 NOTE — TELEPHONE ENCOUNTER
"  General Call    Contacts         Type Contact Phone/Fax    12/01/2023 01:15 PM CST Phone (Incoming) Christiano, Venkatesh LARSON (Self) [phone] (H)    12/05/2023 08:39 AM CST Phone (Outgoing) Christiano Venkatesh LARSON (Self) [phone] (H)    Talked with Patient           Reason for Call:   Callback to get more information regarding initial information below    What are your questions or concerns:    Writer called patient to get more information regarding information below. Per pt, he was told by [facility] to get a \"Cardiac check-up per [facility]'s during visit on 11/29/2023.\"    [facility] is concern because pt has shortness of breath recently and they wanted Dr. Rios to order \"Cardiac work-up.\" They did not specified what to order. Pt honestly thinks it could be his \"Eligard\" medication as he thinks a side effect of taking it is \"SOB.\"    Date of last appointment with provider: 10/31/2023    Could we send this information to you in [facility] or would you prefer to receive a phone call?:   Patient would prefer a phone call   Okay to leave a detailed message?: Yes at Home number on file [phone] (home)    Routing message to Dr. Rios to review and advise.    SHANNAN CaN, RN   [facility]      "

## 2024-02-13 ENCOUNTER — TRANSFERRED RECORDS (OUTPATIENT)
Dept: HEALTH INFORMATION MANAGEMENT | Facility: CLINIC | Age: 74
End: 2024-02-13
Payer: MEDICARE

## 2024-02-15 ENCOUNTER — TRANSFERRED RECORDS (OUTPATIENT)
Dept: HEALTH INFORMATION MANAGEMENT | Facility: CLINIC | Age: 74
End: 2024-02-15
Payer: MEDICARE

## 2024-03-18 ENCOUNTER — TELEPHONE (OUTPATIENT)
Dept: FAMILY MEDICINE | Facility: CLINIC | Age: 74
End: 2024-03-18
Payer: MEDICARE

## 2024-03-18 NOTE — TELEPHONE ENCOUNTER
Reason for Call:  Other prescription    Detailed comments: PATIENT IS REQUESTING A REFILL OF AMLODINE    Phone Number Patient can be reached at: Cell number on file:    Telephone Information:   Mobile [phone]       Best Time: ASAP    Can we leave a detailed message on this number? YES    Call taken on 3/18/2024 at 9:28 AM by Beckie Berry

## 2024-03-18 NOTE — TELEPHONE ENCOUNTER
Pt called back as his current call was dropped. Pt stated that his BP was been low so PCP discontinued Amlodipine back in 10/2023, however after a little while BP became elevated and PCP told Pt to start on his Amlodipine again. Pt will be running out soon and needs PCP to write a New Rx to send to Pharmacy. Please send to [facility] on [address].

## 2024-04-02 DIAGNOSIS — I10 ESSENTIAL HYPERTENSION: Primary | ICD-10-CM

## 2024-04-02 RX ORDER — AMLODIPINE BESYLATE 2.5 MG/1
5 TABLET ORAL DAILY
Qty: 180 TABLET | Refills: 3 | Status: SHIPPED | OUTPATIENT
Start: 2024-04-02

## 2024-04-25 ENCOUNTER — NURSE TRIAGE (OUTPATIENT)
Dept: NURSING | Facility: CLINIC | Age: 74
End: 2024-04-25
Payer: MEDICARE

## 2024-05-07 ENCOUNTER — TRANSFERRED RECORDS (OUTPATIENT)
Dept: HEALTH INFORMATION MANAGEMENT | Facility: CLINIC | Age: 74
End: 2024-05-07
Payer: MEDICARE

## 2024-05-28 DIAGNOSIS — G62.9 POLYNEUROPATHY, UNSPECIFIED: ICD-10-CM

## 2024-05-28 RX ORDER — GABAPENTIN 100 MG/1
CAPSULE ORAL
Qty: 60 CAPSULE | Refills: 11 | Status: SHIPPED | OUTPATIENT
Start: 2024-05-28

## 2024-06-18 ENCOUNTER — TELEPHONE (OUTPATIENT)
Dept: FAMILY MEDICINE | Facility: CLINIC | Age: 74
End: 2024-06-18
Payer: MEDICARE

## 2024-06-18 NOTE — TELEPHONE ENCOUNTER
Patient Quality Outreach    Patient is due for the following:   Physical Annual Wellness Visit      Topic Date Due    Zoster (Shingles) Vaccine (1 of 2) 09/16/2014    Diptheria Tetanus Pertussis (DTAP/TDAP/TD) Vaccine (2 - Td or Tdap) 04/13/2020    COVID-19 Vaccine (6 - 2023-24 season) 09/01/2023       Next Steps:   Patient was scheduled for AWV    Type of outreach:    Phone, spoke to patient/parent. patient      Questions for provider review:    None           Alyssa Johnson MA

## 2024-07-21 ENCOUNTER — HEALTH MAINTENANCE LETTER (OUTPATIENT)
Age: 74
End: 2024-07-21

## 2024-08-14 ENCOUNTER — TRANSFERRED RECORDS (OUTPATIENT)
Dept: HEALTH INFORMATION MANAGEMENT | Facility: CLINIC | Age: 74
End: 2024-08-14
Payer: MEDICARE

## 2024-08-20 ENCOUNTER — OFFICE VISIT (OUTPATIENT)
Dept: FAMILY MEDICINE | Facility: CLINIC | Age: 74
End: 2024-08-20
Payer: COMMERCIAL

## 2024-08-20 VITALS
HEART RATE: 60 BPM | HEIGHT: 68 IN | TEMPERATURE: 97.9 F | SYSTOLIC BLOOD PRESSURE: 130 MMHG | RESPIRATION RATE: 14 BRPM | BODY MASS INDEX: 30.94 KG/M2 | OXYGEN SATURATION: 99 % | WEIGHT: 204.12 LBS | DIASTOLIC BLOOD PRESSURE: 84 MMHG

## 2024-08-20 DIAGNOSIS — Z00.00 ROUTINE GENERAL MEDICAL EXAMINATION AT A HEALTH CARE FACILITY: Primary | ICD-10-CM

## 2024-08-20 DIAGNOSIS — D61.818 PANCYTOPENIA (H): ICD-10-CM

## 2024-08-20 DIAGNOSIS — C61 PROSTATE CANCER METASTATIC TO BONE (H): ICD-10-CM

## 2024-08-20 DIAGNOSIS — C79.51 PROSTATE CANCER METASTATIC TO BONE (H): ICD-10-CM

## 2024-08-20 PROCEDURE — 99397 PER PM REEVAL EST PAT 65+ YR: CPT | Performed by: FAMILY MEDICINE

## 2024-08-20 SDOH — HEALTH STABILITY: PHYSICAL HEALTH: ON AVERAGE, HOW MANY DAYS PER WEEK DO YOU ENGAGE IN MODERATE TO STRENUOUS EXERCISE (LIKE A BRISK WALK)?: 5 DAYS

## 2024-08-20 ASSESSMENT — SOCIAL DETERMINANTS OF HEALTH (SDOH): HOW OFTEN DO YOU GET TOGETHER WITH FRIENDS OR RELATIVES?: TWICE A WEEK

## 2024-08-20 NOTE — PROGRESS NOTES
"Preventive Care Visit  [facility] LEIGHSASHA Rios MD, Family Medicine  Aug 20, 2024      Assessment & Plan     Routine general medical examination at a health care facility    Pt declined labs.      Pancytopenia (H)    Stable.  Plts 120k    Prostate cancer metastatic to bone (H)    Follows with Oncology.              BMI  Estimated body mass index is 31.48 kg/m  as calculated from the following:    Height as of this encounter: 1.715 m (5' 7.52\").    Weight as of this encounter: 92.6 kg (204 lb 1.9 oz).       Counseling  Appropriate preventive services were addressed with this patient via screening, questionnaire, or discussion as appropriate for fall prevention, nutrition, physical activity, Tobacco-use cessation, social engagement, weight loss and cognition.  Checklist reviewing preventive services available has been given to the patient.  Reviewed patient's diet, addressing concerns and/or questions.   The patient was instructed to see the dentist every 6 months.   Information on urinary incontinence and treatment options given to patient.           Nicol Riddle is a 73 year old, presenting for the following:  Annual Visit (Breathing concern)        8/20/2024     8:52 AM   Additional Questions   Roomed by paw p   Accompanied by self         Health Care Directive  Patient does not have a Health Care Directive or Living Will: Discussed advance care planning with patient; information given to patient to review.    HPI  He plugs the catheter less often.  This should decrease chance of infection.    1-2 beers per week.    Eats little meat.    3-mile walks.    Lifts weights.    Golf.              8/20/2024   General Health   How would you rate your overall physical health? (!) FAIR   Feel stress (tense, anxious, or unable to sleep) To some extent      (!) STRESS CONCERN      8/20/2024   Nutrition   Diet: Regular (no restrictions)            8/20/2024   Exercise   Days per week of " moderate/strenous exercise 5 days            8/20/2024   Social Factors   Frequency of gathering with friends or relatives Twice a week   Worry food won't last until get money to buy more No   Food not last or not have enough money for food? No   Do you have housing? (Housing is defined as stable permanent housing and does not include staying ouside in a car, in a tent, in an abandoned building, in an overnight shelter, or couch-surfing.) Yes   Are you worried about losing your housing? No   Lack of transportation? No   Unable to get utilities (heat,electricity)? No            8/20/2024   Fall Risk   Fallen 2 or more times in the past year? No   Trouble with walking or balance? No             8/20/2024   Activities of Daily Living- Home Safety   Needs help with the following daily activites None of the above   Safety concerns in the home None of the above            8/20/2024   Dental   Dentist two times every year? (!) NO            8/20/2024   Hearing Screening   Hearing concerns? None of the above            8/20/2024   Driving Risk Screening   Patient/family members have concerns about driving No            8/20/2024   General Alertness/Fatigue Screening   Have you been more tired than usual lately? No            8/20/2024   Urinary Incontinence Screening   Bothered by leaking urine in past 6 months Yes               Today's PHQ-2 Score:       8/20/2024     8:46 AM   PHQ-2 ( 1999 [employer])   Q1: Little interest or pleasure in doing things 0   Q2: Feeling down, depressed or hopeless 0   PHQ-2 Score 0   Q1: Little interest or pleasure in doing things Not at all   Q2: Feeling down, depressed or hopeless Not at all   PHQ-2 Score 0           8/20/2024   Substance Use   Alcohol more than 3/day or more than 7/wk No   Do you have a current opioid prescription? No   How severe/bad is pain from 1 to 10? 2/10   Do you use any other substances recreationally? (!) OTHER        Social History     Tobacco Use    Smoking status:  Former     Types: Cigarettes     Passive exposure: Never    Smokeless tobacco: Never    Tobacco comments:     Quit 30 years ago.   Vaping Use    Vaping status: Never Used   Substance Use Topics    Alcohol use: Yes     Alcohol/week: 1.0 - 3.0 standard drink of alcohol     Comment: Alcoholic Drinks/day: 3 times a week.    Drug use: No       ASCVD Risk   The ASCVD Risk score (Evan DEAN, et al., 2019) failed to calculate for the following reasons:    Cannot find a previous HDL lab    Cannot find a previous total cholesterol lab            Reviewed and updated as needed this visit by Provider                      Current providers sharing in care for this patient include:  Patient Care Team:  Siva Rios MD as PCP - General (Family Medicine)  Siva Rios MD as Assigned PCP    The following health maintenance items are reviewed in Epic and correct as of today:  Health Maintenance   Topic Date Due    HEPATITIS C SCREENING  Never done    RSV VACCINE (Pregnancy & 60+) (1 - 1-dose 60+ series) Never done    ZOSTER IMMUNIZATION (1 of 2) 09/16/2014    LIPID  07/22/2019    DTAP/TDAP/TD IMMUNIZATION (2 - Td or Tdap) 04/13/2020    ANNUAL REVIEW OF HM ORDERS  06/17/2023    COVID-19 Vaccine (5 - 2023-24 season) 09/01/2023    MEDICARE ANNUAL WELLNESS VISIT  04/19/2024    INFLUENZA VACCINE (1) 09/01/2024    LUNG CANCER SCREENING  04/25/2025    FALL RISK ASSESSMENT  08/20/2025    COLORECTAL CANCER SCREENING  09/01/2025    GLUCOSE  10/27/2026    ADVANCE CARE PLANNING  04/29/2028    PHQ-2 (once per calendar year)  Completed    Pneumococcal Vaccine: 65+ Years  Completed    AORTIC ANEURYSM SCREENING (SYSTEM ASSIGNED)  Completed    IPV IMMUNIZATION  Aged Out    HPV IMMUNIZATION  Aged Out    MENINGITIS IMMUNIZATION  Aged Out    RSV MONOCLONAL ANTIBODY  Aged Out     Current Outpatient Medications   Medication Sig Dispense Refill    acetaminophen (TYLENOL) 325 MG tablet Take 2 tablets (650 mg) by mouth every 6 hours as  "needed for mild pain or fever      amLODIPine (NORVASC) 2.5 MG tablet Take 2 tablets (5 mg) by mouth daily 180 tablet 3    docusate sodium (COLACE) 100 MG capsule [DOCUSATE SODIUM (COLACE) 100 MG CAPSULE] Take 100 mg by mouth 2 (two) times a day as needed for constipation.      gabapentin (NEURONTIN) 100 MG capsule TAKE 1-2 CAPSULES BY MOUTH AT BEDTIME 60 capsule 11    leuprolide (ELIGARD) 45 MG kit Inject 45 mg Subcutaneous every 6 months      polyethylene glycol (MIRALAX) 17 g packet Take 17 g by mouth daily as needed for constipation 30 each 0    sennosides (SENOKOT) 8.6 MG tablet Take 2 tablets by mouth daily as needed for constipation 60 tablet 0          Objective    Exam  /84   Pulse 60   Temp 97.9  F (36.6  C) (Oral)   Resp 14   Ht 1.715 m (5' 7.52\")   Wt 92.6 kg (204 lb 1.9 oz)   SpO2 99%   BMI 31.48 kg/m     Estimated body mass index is 31.48 kg/m  as calculated from the following:    Height as of this encounter: 1.715 m (5' 7.52\").    Weight as of this encounter: 92.6 kg (204 lb 1.9 oz).    Physical Exam    Eyes: EOM full, pupils normal, conjunctivae normal  Ears: 90% wax bilateral.  (He has Debrox)  Oropharynx: normal  Neck: supple without adenopathy or thyromegaly  Lungs: normal  Heart: regular rhythm, normal rate, no murmur  Abdomen: no HSM, mass or tenderness  Extremities: FROM, normal strength and sensation        8/20/2024   Mini Cog   Clock Draw Score 2 Normal   3 Item Recall 2 objects recalled   Mini Cog Total Score 4                 Signed Electronically by: Siva Rios MD    "

## 2024-09-05 ENCOUNTER — TRANSFERRED RECORDS (OUTPATIENT)
Dept: HEALTH INFORMATION MANAGEMENT | Facility: CLINIC | Age: 74
End: 2024-09-05
Payer: MEDICARE

## 2024-11-06 ENCOUNTER — TRANSFERRED RECORDS (OUTPATIENT)
Dept: HEALTH INFORMATION MANAGEMENT | Facility: CLINIC | Age: 74
End: 2024-11-06
Payer: MEDICARE

## 2024-12-19 ENCOUNTER — TRANSFERRED RECORDS (OUTPATIENT)
Dept: HEALTH INFORMATION MANAGEMENT | Facility: CLINIC | Age: 74
End: 2024-12-19
Payer: MEDICARE

## 2025-02-04 ENCOUNTER — TRANSFERRED RECORDS (OUTPATIENT)
Dept: HEALTH INFORMATION MANAGEMENT | Facility: CLINIC | Age: 75
End: 2025-02-04
Payer: MEDICARE

## 2025-03-05 ENCOUNTER — TRANSFERRED RECORDS (OUTPATIENT)
Dept: HEALTH INFORMATION MANAGEMENT | Facility: CLINIC | Age: 75
End: 2025-03-05
Payer: MEDICARE

## 2025-03-06 ENCOUNTER — TRANSFERRED RECORDS (OUTPATIENT)
Dept: HEALTH INFORMATION MANAGEMENT | Facility: CLINIC | Age: 75
End: 2025-03-06
Payer: MEDICARE

## 2025-03-13 ENCOUNTER — TRANSFERRED RECORDS (OUTPATIENT)
Dept: HEALTH INFORMATION MANAGEMENT | Facility: CLINIC | Age: 75
End: 2025-03-13
Payer: MEDICARE

## 2025-04-03 ENCOUNTER — TRANSFERRED RECORDS (OUTPATIENT)
Dept: HEALTH INFORMATION MANAGEMENT | Facility: CLINIC | Age: 75
End: 2025-04-03
Payer: MEDICARE

## 2025-04-06 DIAGNOSIS — I10 ESSENTIAL HYPERTENSION: ICD-10-CM

## 2025-04-06 RX ORDER — AMLODIPINE BESYLATE 2.5 MG/1
5 TABLET ORAL DAILY
Qty: 180 TABLET | Refills: 3 | Status: SHIPPED | OUTPATIENT
Start: 2025-04-06

## 2025-04-24 ENCOUNTER — TRANSFERRED RECORDS (OUTPATIENT)
Dept: HEALTH INFORMATION MANAGEMENT | Facility: CLINIC | Age: 75
End: 2025-04-24
Payer: MEDICARE

## 2025-06-13 ENCOUNTER — TRANSFERRED RECORDS (OUTPATIENT)
Dept: HEALTH INFORMATION MANAGEMENT | Facility: CLINIC | Age: 75
End: 2025-06-13
Payer: MEDICARE

## 2025-07-21 ENCOUNTER — PATIENT OUTREACH (OUTPATIENT)
Dept: CARE COORDINATION | Facility: CLINIC | Age: 75
End: 2025-07-21
Payer: MEDICARE

## 2025-07-25 ENCOUNTER — TRANSFERRED RECORDS (OUTPATIENT)
Dept: HEALTH INFORMATION MANAGEMENT | Facility: CLINIC | Age: 75
End: 2025-07-25
Payer: MEDICARE

## 2025-08-02 ENCOUNTER — PATIENT OUTREACH (OUTPATIENT)
Dept: CARE COORDINATION | Facility: CLINIC | Age: 75
End: 2025-08-02
Payer: MEDICARE

## 2025-08-27 ENCOUNTER — TELEPHONE (OUTPATIENT)
Dept: FAMILY MEDICINE | Facility: CLINIC | Age: 75
End: 2025-08-27
Payer: MEDICARE

## (undated) RX ORDER — LIDOCAINE HYDROCHLORIDE 10 MG/ML
INJECTION, SOLUTION INFILTRATION; PERINEURAL
Status: DISPENSED
Start: 2021-10-29

## (undated) RX ORDER — HEPARIN SODIUM 1000 [USP'U]/ML
INJECTION, SOLUTION INTRAVENOUS; SUBCUTANEOUS
Status: DISPENSED
Start: 2021-10-29

## (undated) RX ORDER — FENTANYL CITRATE 50 UG/ML
INJECTION, SOLUTION INTRAMUSCULAR; INTRAVENOUS
Status: DISPENSED
Start: 2021-10-29